# Patient Record
Sex: FEMALE | Race: BLACK OR AFRICAN AMERICAN | NOT HISPANIC OR LATINO | Employment: UNEMPLOYED | URBAN - METROPOLITAN AREA
[De-identification: names, ages, dates, MRNs, and addresses within clinical notes are randomized per-mention and may not be internally consistent; named-entity substitution may affect disease eponyms.]

---

## 2017-09-29 ENCOUNTER — HOSPITAL ENCOUNTER (OUTPATIENT)
Dept: PREADMISSION TESTING | Facility: HOSPITAL | Age: 52
Discharge: HOME | End: 2017-09-29
Attending: SURGERY
Payer: MEDICAID

## 2017-09-29 VITALS
BODY MASS INDEX: 29.15 KG/M2 | SYSTOLIC BLOOD PRESSURE: 132 MMHG | OXYGEN SATURATION: 98 % | RESPIRATION RATE: 20 BRPM | DIASTOLIC BLOOD PRESSURE: 79 MMHG | WEIGHT: 158.38 LBS | HEIGHT: 62 IN | HEART RATE: 65 BPM | TEMPERATURE: 98 F

## 2017-09-29 DIAGNOSIS — I10 HTN (HYPERTENSION), MALIGNANT: Primary | ICD-10-CM

## 2017-09-29 PROCEDURE — 93005 ELECTROCARDIOGRAM TRACING: CPT

## 2017-09-29 PROCEDURE — 93010 ELECTROCARDIOGRAM REPORT: CPT | Mod: ,,, | Performed by: INTERNAL MEDICINE

## 2017-09-29 NOTE — DISCHARGE INSTRUCTIONS
Your surgery is scheduled for___TUESDAY 10/3______________.    Call [phone] between 2 pm and 5 pm ___MONDAY 10/2_________ to find out your arrival time for the day of surgery.    Report to SAME DAY SURGERY UNIT at _______am on the 2nd floor of the hospital.  Use the front entrance of the hospital before 6 am.  .    Important instructions:   Do not eat or drink after 12 midnight, including water.  It is okay to brush your teeth.  Do not have gum, candy or mints.     Take only these medications with a small swallow of water on the morning of your surgery.__AMLODIPINE___________               Prep instructions:    SHOWER   OTHER_____________     Please shower the night before and the morning of your surgery.       Use Hibiclens soap to your surgery site if instructed by your pre op nurse.   .  Be sure to rinse off Hibiclens after it is on your skin for several minutes.  Do not use Hibiclens on your face or genitals.      Do not wear make- up, including mascara.     You may wear deodorant only.      Do not wear powder, body lotion or cologne.     Do not wear any jewelry or have any metal on your body.          If you are going home on the same day of surgery, you must have arrangements for a ride home.  You will not be able to drive home if you were given anesthesia or sedation.          .     Stop taking Aspirin, Ibuprofen, Motrin and Aleve at least 3-5 days before your surgery. You may use Tylenol.     Stop taking fish oil and vitamin E for least 5 days before surgery.     Wear loose fitting clothes allowing for bandages.     Please leave money and valuables home.       You may bring your cell phone.     Call the doctor if fever or illness should occur before your surgery.    Call [phone] to contact us here at [facility] if needed.

## 2017-10-02 ENCOUNTER — ANESTHESIA EVENT (OUTPATIENT)
Dept: SURGERY | Facility: HOSPITAL | Age: 52
End: 2017-10-02
Payer: MEDICAID

## 2017-10-03 ENCOUNTER — SURGERY (OUTPATIENT)
Age: 52
End: 2017-10-03

## 2017-10-03 ENCOUNTER — HOSPITAL ENCOUNTER (OUTPATIENT)
Dept: RADIOLOGY | Facility: HOSPITAL | Age: 52
Discharge: HOME | End: 2017-10-03
Attending: SURGERY | Admitting: SURGERY
Payer: MEDICAID

## 2017-10-03 ENCOUNTER — HOSPITAL ENCOUNTER (OUTPATIENT)
Dept: PREADMISSION TESTING | Facility: HOSPITAL | Age: 52
Discharge: HOME | End: 2017-10-03
Attending: SURGERY
Payer: MEDICAID

## 2017-10-03 ENCOUNTER — ANESTHESIA (OUTPATIENT)
Dept: SURGERY | Facility: HOSPITAL | Age: 52
End: 2017-10-03
Payer: MEDICAID

## 2017-10-03 ENCOUNTER — HOSPITAL ENCOUNTER (OUTPATIENT)
Facility: HOSPITAL | Age: 52
Discharge: HOME | End: 2017-10-03
Attending: SURGERY | Admitting: SURGERY
Payer: MEDICAID

## 2017-10-03 VITALS
HEART RATE: 64 BPM | WEIGHT: 158.38 LBS | HEIGHT: 62 IN | BODY MASS INDEX: 29.15 KG/M2 | RESPIRATION RATE: 17 BRPM | DIASTOLIC BLOOD PRESSURE: 89 MMHG | OXYGEN SATURATION: 100 % | SYSTOLIC BLOOD PRESSURE: 149 MMHG | TEMPERATURE: 98 F

## 2017-10-03 DIAGNOSIS — C50.412 INFILTRATING DUCTAL CARCINOMA OF UPPER-OUTER QUADRANT OF LEFT BREAST IN FEMALE: ICD-10-CM

## 2017-10-03 DIAGNOSIS — Z01.818 PREOPERATIVE TESTING: Primary | ICD-10-CM

## 2017-10-03 DIAGNOSIS — C50.412 PRIMARY CANCER OF UPPER OUTER QUADRANT OF LEFT FEMALE BREAST: Primary | ICD-10-CM

## 2017-10-03 PROCEDURE — 37000009 HC ANESTHESIA EA ADD 15 MINS: Performed by: SURGERY

## 2017-10-03 PROCEDURE — D9220A PRA ANESTHESIA: Mod: CRNA,,, | Performed by: NURSE ANESTHETIST, CERTIFIED REGISTERED

## 2017-10-03 PROCEDURE — 71020 XR CHEST PA AND LATERAL PRE-OP: CPT | Mod: 26,,, | Performed by: RADIOLOGY

## 2017-10-03 PROCEDURE — 36000706: Performed by: SURGERY

## 2017-10-03 PROCEDURE — 25000003 PHARM REV CODE 250: Performed by: ANESTHESIOLOGY

## 2017-10-03 PROCEDURE — 71000016 HC POSTOP RECOV ADDL HR: Performed by: SURGERY

## 2017-10-03 PROCEDURE — 71020 XR CHEST PA AND LATERAL PRE-OP: CPT | Mod: TC

## 2017-10-03 PROCEDURE — 63600175 PHARM REV CODE 636 W HCPCS: Performed by: SURGERY

## 2017-10-03 PROCEDURE — D9220A PRA ANESTHESIA: Mod: ANES,,, | Performed by: ANESTHESIOLOGY

## 2017-10-03 PROCEDURE — 37000008 HC ANESTHESIA 1ST 15 MINUTES: Performed by: SURGERY

## 2017-10-03 PROCEDURE — C1788 PORT, INDWELLING, IMP: HCPCS | Performed by: SURGERY

## 2017-10-03 PROCEDURE — 36000707: Performed by: SURGERY

## 2017-10-03 PROCEDURE — 63600175 PHARM REV CODE 636 W HCPCS: Performed by: NURSE ANESTHETIST, CERTIFIED REGISTERED

## 2017-10-03 PROCEDURE — 71000015 HC POSTOP RECOV 1ST HR: Performed by: SURGERY

## 2017-10-03 PROCEDURE — 25000003 PHARM REV CODE 250: Performed by: SURGERY

## 2017-10-03 DEVICE — PORT POWER 8FR NO SUT PLUG: Type: IMPLANTABLE DEVICE | Site: CHEST | Status: FUNCTIONAL

## 2017-10-03 RX ORDER — MIDAZOLAM HYDROCHLORIDE 1 MG/ML
INJECTION, SOLUTION INTRAMUSCULAR; INTRAVENOUS
Status: DISCONTINUED | OUTPATIENT
Start: 2017-10-03 | End: 2017-10-03

## 2017-10-03 RX ORDER — BUPIVACAINE HYDROCHLORIDE 2.5 MG/ML
INJECTION, SOLUTION EPIDURAL; INFILTRATION; INTRACAUDAL
Status: DISCONTINUED | OUTPATIENT
Start: 2017-10-03 | End: 2017-10-03 | Stop reason: HOSPADM

## 2017-10-03 RX ORDER — HEPARIN SODIUM 1000 [USP'U]/ML
INJECTION, SOLUTION INTRAVENOUS; SUBCUTANEOUS
Status: DISCONTINUED | OUTPATIENT
Start: 2017-10-03 | End: 2017-10-03 | Stop reason: HOSPADM

## 2017-10-03 RX ORDER — HYDROCODONE BITARTRATE AND ACETAMINOPHEN 5; 325 MG/1; MG/1
1 TABLET ORAL EVERY 4 HOURS PRN
Status: DISCONTINUED | OUTPATIENT
Start: 2017-10-03 | End: 2017-10-03 | Stop reason: HOSPADM

## 2017-10-03 RX ORDER — SODIUM CHLORIDE 9 MG/ML
INJECTION, SOLUTION INTRAVENOUS CONTINUOUS
Status: DISCONTINUED | OUTPATIENT
Start: 2017-10-03 | End: 2017-10-03 | Stop reason: HOSPADM

## 2017-10-03 RX ORDER — ACETAMINOPHEN 10 MG/ML
1000 INJECTION, SOLUTION INTRAVENOUS ONCE
Status: DISCONTINUED | OUTPATIENT
Start: 2017-10-03 | End: 2017-10-03 | Stop reason: SDUPTHER

## 2017-10-03 RX ORDER — LIDOCAINE HYDROCHLORIDE 10 MG/ML
INJECTION, SOLUTION EPIDURAL; INFILTRATION; INTRACAUDAL; PERINEURAL
Status: DISCONTINUED | OUTPATIENT
Start: 2017-10-03 | End: 2017-10-03 | Stop reason: HOSPADM

## 2017-10-03 RX ORDER — HYDROCODONE BITARTRATE AND ACETAMINOPHEN 5; 325 MG/1; MG/1
1 TABLET ORAL EVERY 4 HOURS PRN
Status: DISCONTINUED | OUTPATIENT
Start: 2017-10-03 | End: 2017-10-03 | Stop reason: SDUPTHER

## 2017-10-03 RX ORDER — PROPOFOL 10 MG/ML
VIAL (ML) INTRAVENOUS CONTINUOUS PRN
Status: DISCONTINUED | OUTPATIENT
Start: 2017-10-03 | End: 2017-10-03

## 2017-10-03 RX ORDER — CEFAZOLIN SODIUM 2 G/50ML
2 SOLUTION INTRAVENOUS
Status: COMPLETED | OUTPATIENT
Start: 2017-10-03 | End: 2017-10-03

## 2017-10-03 RX ORDER — OXYCODONE AND ACETAMINOPHEN 5; 325 MG/1; MG/1
1 TABLET ORAL EVERY 4 HOURS PRN
Qty: 30 TABLET | Refills: 0 | Status: SHIPPED | OUTPATIENT
Start: 2017-10-03

## 2017-10-03 RX ORDER — MORPHINE SULFATE 10 MG/ML
3 INJECTION INTRAMUSCULAR; INTRAVENOUS; SUBCUTANEOUS
Status: DISCONTINUED | OUTPATIENT
Start: 2017-10-03 | End: 2017-10-03 | Stop reason: SDUPTHER

## 2017-10-03 RX ORDER — SODIUM CHLORIDE, SODIUM LACTATE, POTASSIUM CHLORIDE, CALCIUM CHLORIDE 600; 310; 30; 20 MG/100ML; MG/100ML; MG/100ML; MG/100ML
INJECTION, SOLUTION INTRAVENOUS CONTINUOUS
Status: DISCONTINUED | OUTPATIENT
Start: 2017-10-03 | End: 2017-10-03 | Stop reason: HOSPADM

## 2017-10-03 RX ORDER — FENTANYL CITRATE 50 UG/ML
INJECTION, SOLUTION INTRAMUSCULAR; INTRAVENOUS
Status: DISCONTINUED | OUTPATIENT
Start: 2017-10-03 | End: 2017-10-03

## 2017-10-03 RX ORDER — LIDOCAINE HYDROCHLORIDE 10 MG/ML
1 INJECTION, SOLUTION EPIDURAL; INFILTRATION; INTRACAUDAL; PERINEURAL ONCE
Status: DISCONTINUED | OUTPATIENT
Start: 2017-10-03 | End: 2017-10-03 | Stop reason: HOSPADM

## 2017-10-03 RX ORDER — MORPHINE SULFATE 10 MG/ML
3 INJECTION INTRAMUSCULAR; INTRAVENOUS; SUBCUTANEOUS
Status: DISCONTINUED | OUTPATIENT
Start: 2017-10-03 | End: 2017-10-03 | Stop reason: HOSPADM

## 2017-10-03 RX ORDER — LIDOCAINE HCL/PF 100 MG/5ML
SYRINGE (ML) INTRAVENOUS
Status: DISCONTINUED | OUTPATIENT
Start: 2017-10-03 | End: 2017-10-03

## 2017-10-03 RX ORDER — ACETAMINOPHEN 10 MG/ML
1000 INJECTION, SOLUTION INTRAVENOUS ONCE
Status: DISCONTINUED | OUTPATIENT
Start: 2017-10-03 | End: 2017-10-03 | Stop reason: HOSPADM

## 2017-10-03 RX ORDER — PROPOFOL 10 MG/ML
VIAL (ML) INTRAVENOUS
Status: DISCONTINUED | OUTPATIENT
Start: 2017-10-03 | End: 2017-10-03

## 2017-10-03 RX ADMIN — PROPOFOL 100 MG: 10 INJECTION, EMULSION INTRAVENOUS at 11:10

## 2017-10-03 RX ADMIN — MIDAZOLAM HYDROCHLORIDE 2 MG: 1 INJECTION, SOLUTION INTRAMUSCULAR; INTRAVENOUS at 10:10

## 2017-10-03 RX ADMIN — FENTANYL CITRATE 50 MCG: 50 INJECTION INTRAMUSCULAR; INTRAVENOUS at 11:10

## 2017-10-03 RX ADMIN — LIDOCAINE HYDROCHLORIDE 100 MG: 20 INJECTION, SOLUTION INTRAVENOUS at 11:10

## 2017-10-03 RX ADMIN — PROPOFOL 30 MG: 10 INJECTION, EMULSION INTRAVENOUS at 11:10

## 2017-10-03 RX ADMIN — SODIUM CHLORIDE, SODIUM LACTATE, POTASSIUM CHLORIDE, AND CALCIUM CHLORIDE: 600; 310; 30; 20 INJECTION, SOLUTION INTRAVENOUS at 08:10

## 2017-10-03 RX ADMIN — CEFAZOLIN SODIUM 2 G: 2 SOLUTION INTRAVENOUS at 10:10

## 2017-10-03 RX ADMIN — BUPIVACAINE HYDROCHLORIDE 20 ML: 2.5 INJECTION, SOLUTION EPIDURAL; INFILTRATION; INTRACAUDAL; PERINEURAL at 11:10

## 2017-10-03 RX ADMIN — HEPARIN SODIUM 2500 UNITS: 1000 INJECTION, SOLUTION INTRAVENOUS; SUBCUTANEOUS at 11:10

## 2017-10-03 RX ADMIN — LIDOCAINE HYDROCHLORIDE 20 ML: 10 INJECTION, SOLUTION EPIDURAL; INFILTRATION; INTRACAUDAL; PERINEURAL at 11:10

## 2017-10-03 RX ADMIN — HYDROCODONE BITARTRATE AND ACETAMINOPHEN 1 TABLET: 5; 325 TABLET ORAL at 11:10

## 2017-10-03 RX ADMIN — PROPOFOL 120 MCG/KG/MIN: 10 INJECTION, EMULSION INTRAVENOUS at 11:10

## 2017-10-03 NOTE — OP NOTE
Port-A-Cath Insertion        DATE OF PROCEDURE: 10/03/2017     PREOPERATIVE DIAGNOSIS: breast cancer     POSTOPERATIVE DIAGNOSIS: same     PROCEDURE PERFORMED: Port-A-Cath insertion via cephalic vein cut down, left.     SURGEON: Kishan Moore M.D.     ANESTHESIA: General.     DESCRIPTION OF OPERATION: The patient was brought to the Operating Room, placed  on operating room table in supine position. Under adequate general anesthesia,  prepped and draped around her left shoulder in the usual sterile fashion.   Incision was made in deltopectoral groove, deepened to expose her cephalic vein.  Proximal and distal control was obtained. Small venotomy was performed.   Catheter was inserted to the cavoatrial junction and verified with fluoroscopy.   This was attached to a port, which was fixed to the patient's chest wall and   flushed. The wound was then closed in layers with absorbable suture.   Steri-Strips were applied as well as bandage.     ESTIMATED BLOOD LOSS: Minimal.

## 2017-10-03 NOTE — DISCHARGE INSTRUCTIONS
[facility]  Office # [phone]     Discharge Instructions for Same Day Surgery     Call the office for and appointment if one has not already been made.     Diet: Drink plenty of fluids the first 48 hours and you may resume your   usual diet.     Activity: No heavy lifting (over 10 pounds), pushing or pulling until your   post op visit. Your doctor's office may have told you to limit your lifting to less weight, or even no weight.  Be sure to follow those instructions.    Note: You may ride in a car and you may drive when comfortable.     Do not drive, drink alcohol, or sign legal documents for 24 hours, or if taking narcotic pain medication.     YOUR LAST PAIN PILL WAS GIVEN AT 12:00 PM    Dressings: Remove the dressing 24hours after surgery. You may shower or bathe  24 hours after surgery and you may wash your hair.     If you have steri strips ( appears to be strips of white tape) on   your incision, leave them on.         Medical: Call the doctor for any of the following problems: fever above 101,   severe pain, bleeding, or abdominal distention (swelling).   If constipated you may take any stool softener you choose.     Occasionally small areas of skin numbness or an unpleasant skin sensation can result. Also, you may find that your incision is swollen and tender for a few days.  Some redness around sutures and staples is a normal reaction, but if the discomfort persists or worsens, call you doctor.     Fall Prevention  Millions of people fall every year and injure themselves. You may have had anesthesia or sedation which may increase your risk of falling. You may have health issues that put you at an increased risk of falling.     Here are ways to reduce your risk of falling.  ·   · Make your home safe by keeping walkways clear of objects you may trip over.  · Use non-slip pads under rugs. Do not use area rugs or small throw rugs.  · Use non-slip mats in bathtubs and showers.  · Install  handrails and lights on staircases.  · Do not walk in poorly lit areas.  · Do not stand on chairs or wobbly ladders.  · Use caution when reaching overhead or looking upward. This position can cause a loss of balance.  · Be sure your shoes fit properly, have non-slip bottoms and are in good condition.   · Wear shoes both inside and out. Avoid going barefoot or wearing slippers.  · Be cautious when going up and down stairs, curbs, and when walking on uneven sidewalks.  · If your balance is poor, consider using a cane or walker.  · If your fall was related to alcohol use, stop or limit alcohol intake.   · If your fall was related to use of sleeping medicines, talk to your doctor about this. You may need to reduce your dosage at bedtime if you awaken during the night to go to the bathroom.    · To reduce the need for nighttime bathroom trips:  ¨ Avoid drinking fluids for several hours before going to bed  ¨ Empty your bladder before going to bed  ¨ Men can keep a urinal at the bedside  · Stay as active as you can. Balance, flexibility, strength, and endurance all come from exercise. They all play a role in preventing falls. Ask your healthcare provider which types of activity are right for you.  · Get your vision checked on a regular basis.  · If you have pets, know where they are before you stand up or walk so you don't trip over them.  · Use night lights.

## 2017-10-03 NOTE — OR NURSING
Pt reports has been having a large amount of rectal bleeding.  Stated she had an appt for colonoscopy and with new diagnosis and testing was unable to complete the testing.  Stressed to patient how important it is to follow up and get colonoscopy done in respect to diagnosis, relating to chemo and for overall wellbeing.  Pt agreed.  Pt reports occasional chest discomfort and BANERJEE, no present complaints spoke with MATHEW Watson CRNA anesthesia.

## 2017-10-03 NOTE — BRIEF OP NOTE
[facility]  Brief Operative Note     SUMMARY     Surgery Date: 10/3/2017     Surgeon(s) and Role:     * Kishan Moore MD - Primary    Assisting Surgeon: None    Pre-op Diagnosis:  Malignant neoplasm of upper-outer quadrant of left female breast, unspecified estrogen receptor status [C50.412]    Post-op Diagnosis:  Post-Op Diagnosis Codes:     * Malignant neoplasm of upper-outer quadrant of left female breast, unspecified estrogen receptor status [C50.412]    Procedure(s) (LRB):  [id number] (N/A)    Anesthesia: Monitor Anesthesia Care    Description of the findings of the procedure: none    Findings/Key Components: none    Estimated Blood Loss: minimal         Specimens:   Specimen (12h ago through future)    None          Discharge Note    SUMMARY     Admit Date: 10/3/2017    Discharge Date and Time:  10/03/2017 11:24 AM    Hospital Course (synopsis of major diagnoses, care, treatment, and services provided during the course of the hospital stay): uneventful post op course     Final Diagnosis: Post-Op Diagnosis Codes:     * Malignant neoplasm of upper-outer quadrant of left female breast, unspecified estrogen receptor status [C50.412]    Disposition: Home or Self Care    Follow Up/Patient Instructions:     Medications:  Reconciled Home Medications:   Current Discharge Medication List      CONTINUE these medications which have NOT CHANGED    Details   alprazolam (XANAX) 0.5 MG tablet       amlodipine (NORVASC) 5 MG tablet       ENDACOF - DM 1-2.5-5 mg/5 mL Soln TAKE 5 ml (1 teaspoon) BY MOUTH EVERY 6 HOURS AS NEEDED  Refills: 0      hydrochlorothiazide (HYDRODIURIL) 50 MG tablet Take 1 tablet (50 mg total) by mouth once daily.  Qty: 90 tablet, Refills: 2      lisinopril (PRINIVIL,ZESTRIL) 40 MG tablet Take 0.5 tablets (20 mg total) by mouth once daily.  Qty: 90 tablet, Refills: 0      albuterol 90 mcg/actuation inhaler Inhale 2 puffs into the lungs every 4 (four) hours as needed for  Wheezing or Shortness of Breath.  Qty: 18 g, Refills: 0      ibuprofen (ADVIL,MOTRIN) 800 MG tablet              Discharge Procedure Orders  Diet general     Activity as tolerated     Shower on day dressing removed (No bath)     Call MD for:  temperature >100.4     Call MD for:  persistent nausea and vomiting     Call MD for:  severe uncontrolled pain     Call MD for:  difficulty breathing, headache or visual disturbances     Call MD for:  redness, tenderness, or signs of infection (pain, swelling, redness, odor or green/yellow discharge around incision site)     Call MD for:  hives     Remove dressing in 24 hours       Follow-up Information     Kishan Moore MD.    Specialty:  General Surgery  Contact information:  [address] [place] [address]  [id number]  [place] [address] [phone]

## 2017-10-03 NOTE — INTERVAL H&P NOTE
The patient has been examined and the H&P has been reviewed:    I concur with the findings and no changes have occurred since H&P was written.    Anesthesia/Surgery risks, benefits and alternative options discussed and understood by patient/family.          Active Hospital Problems    Diagnosis  POA    Infiltrating ductal carcinoma of upper-outer quadrant of left breast in female [C50.412]  Yes      Resolved Hospital Problems    Diagnosis Date Resolved POA   No resolved problems to display.

## 2017-10-03 NOTE — H&P (VIEW-ONLY)
Subjective:       Patient ID: Aundrea Palacio is a 51 y.o. female.    Chief Complaint: Breast Cancer    HPI 52 yo female with newly diagnosed triple negative breast cancer with strong family history.  Review of Systems   Constitutional: Negative.    HENT: Negative.    Eyes: Negative.    Respiratory: Negative.    Cardiovascular: Negative.    Gastrointestinal: Negative.    Endocrine: Negative.    Musculoskeletal: Negative.    Skin: Negative.    Allergic/Immunologic: Negative.    Neurological: Negative.    Hematological: Negative.    Psychiatric/Behavioral: Negative.    All other systems reviewed and are negative.      Objective:      Physical Exam   Constitutional: She is oriented to person, place, and time. She appears well-developed and well-nourished.   HENT:   Head: Normocephalic and atraumatic.   Right Ear: External ear normal.   Left Ear: External ear normal.   Nose: Nose normal.   Mouth/Throat: Oropharynx is clear and moist.   Eyes: Conjunctivae and EOM are normal. Pupils are equal, round, and reactive to light.   Neck: Normal range of motion. Neck supple.   Cardiovascular: Normal rate, regular rhythm, normal heart sounds and intact distal pulses.    Pulmonary/Chest: Effort normal and breath sounds normal. Right breast exhibits no inverted nipple, no mass, no nipple discharge, no skin change and no tenderness. Left breast exhibits mass. Left breast exhibits no inverted nipple, no nipple discharge, no skin change and no tenderness.       Abdominal: Soft. Bowel sounds are normal.   Musculoskeletal: Normal range of motion.   Neurological: She is alert and oriented to person, place, and time. She has normal reflexes.   Skin: Skin is warm and dry.   Psychiatric: She has a normal mood and affect. Her behavior is normal. Thought content normal.   Vitals reviewed.      Assessment:       1. Malignant neoplasm of upper-outer quadrant of left breast in female, estrogen receptor negative        Plan:       To the OR for  port placement and then for chemo and post chemo lumpectomy vs mastectomy

## 2017-10-03 NOTE — TRANSFER OF CARE
"Anesthesia Transfer of Care Note    Patient: Aundrea Palacio    Procedure(s) Performed: Procedure(s) (LRB):  [id number] (N/A)    Patient location: OPS    Anesthesia Type: MAC    Transport from OR: Transported from OR on room air with adequate spontaneous ventilation    Post pain: adequate analgesia    Post assessment: no apparent anesthetic complications    Post vital signs: stable    Level of consciousness: awake, alert and oriented    Nausea/Vomiting: no nausea/vomiting    Complications: none    Transfer of care protocol was followed      Last vitals:   Visit Vitals  /77   Pulse 67   Temp 36.4 °C (97.5 °F)   Resp 16   Ht 5' 2" (1.575 m)   Wt 71.8 kg (158 lb 6 oz)   SpO2 99%   BMI 28.97 kg/m²     "

## 2017-10-04 NOTE — ANESTHESIA PREPROCEDURE EVALUATION
10/04/2017  Aundrea Palacio is a 52 y.o., female.    Anesthesia Evaluation     I have reviewed the Nursing Notes.      Review of Systems  Anesthesia Hx:  No problems with previous Anesthesia   Hematology/Oncology:        Current/Recent Cancer. Breast   Cardiovascular:   Exercise tolerance: good Denies Pacemaker. Hypertension     Pulmonary:   Denies Pneumonia Denies COPD.  Denies Asthma.  Denies Recent URI.    Renal/:  Renal/ Normal     Hepatic/GI:   No Bowel Prep. Denies Liver Disease. Denies Hepatitis. Pt reports bleeding in her stool. She agrees to contact GI for evaluation   Neurological:  Neurology Normal    Endocrine:  Endocrine Normal        Physical Exam  General:  Well nourished    Airway/Jaw/Neck:  AIRWAY FINDINGS: Normal           Mental Status:  Mental Status Findings: Normal        Anesthesia Plan  Type of Anesthesia, risks & benefits discussed:  Anesthesia Type:  MAC  Patient's Preference:   Intra-op Monitoring Plan: standard ASA monitors  Intra-op Monitoring Plan Comments:   Post Op Pain Control Plan:   Post Op Pain Control Plan Comments:   Induction:   IV  Beta Blocker:  Patient is not currently on a Beta-Blocker (No further documentation required).       Informed Consent: Patient understands risks and agrees with Anesthesia plan.  Questions answered. Anesthesia consent signed with patient.  ASA Score: 2     Day of Surgery Review of History & Physical:    H&P update referred to the surgeon.         Ready For Surgery From Anesthesia Perspective.

## 2017-10-04 NOTE — ANESTHESIA POSTPROCEDURE EVALUATION
"Anesthesia Post Evaluation    Patient: Aundrea Palacio    Procedure(s) Performed: Procedure(s) (LRB):  [id number] (N/A)    Final Anesthesia Type: MAC  Patient location during evaluation: PACU  Patient participation: Yes- Able to Participate  Level of consciousness: oriented and awake and alert  Post-procedure vital signs: reviewed and stable  Pain management: adequate  Airway patency: patent  PONV status at discharge: No PONV  Anesthetic complications: no      Cardiovascular status: blood pressure returned to baseline and hemodynamically stable  Respiratory status: unassisted  Hydration status: euvolemic  Follow-up not needed.        Visit Vitals  BP (!) 149/89   Pulse 64   Temp 36.5 °C (97.7 °F)   Resp 17   Ht 5' 2" (1.575 m)   Wt 71.8 kg (158 lb 6 oz)   SpO2 100%   BMI 28.97 kg/m²       Pain/Americo Score: Pain Assessment Performed: Yes (10/3/2017  1:30 PM)  Presence of Pain: complains of pain/discomfort (10/3/2017  1:30 PM)  Pain Rating Prior to Med Admin: 9 (10/3/2017 11:59 AM)  Pain Rating Post Med Admin: 5 (10/3/2017  1:30 PM)  Americo Score: 10 (10/3/2017  1:00 PM)  Modified Americo Score: 19 (10/3/2017  1:00 PM)      "

## 2018-11-15 ENCOUNTER — HISTORICAL (OUTPATIENT)
Dept: RADIOLOGY | Facility: HOSPITAL | Age: 53
End: 2018-11-15

## 2018-12-04 ENCOUNTER — HISTORICAL (OUTPATIENT)
Dept: INFUSION THERAPY | Facility: HOSPITAL | Age: 53
End: 2018-12-04

## 2019-01-02 ENCOUNTER — HISTORICAL (OUTPATIENT)
Dept: ADMINISTRATIVE | Facility: HOSPITAL | Age: 54
End: 2019-01-02

## 2019-01-09 ENCOUNTER — HOSPITAL ENCOUNTER (OUTPATIENT)
Dept: MEDSURG UNIT | Facility: HOSPITAL | Age: 54
End: 2019-01-10

## 2019-01-21 ENCOUNTER — HISTORICAL (OUTPATIENT)
Dept: RADIATION THERAPY | Facility: HOSPITAL | Age: 54
End: 2019-01-21

## 2019-01-29 ENCOUNTER — HISTORICAL (OUTPATIENT)
Dept: INFUSION THERAPY | Facility: HOSPITAL | Age: 54
End: 2019-01-29

## 2019-03-26 ENCOUNTER — HISTORICAL (OUTPATIENT)
Dept: INFUSION THERAPY | Facility: HOSPITAL | Age: 54
End: 2019-03-26

## 2019-05-28 ENCOUNTER — HISTORICAL (OUTPATIENT)
Dept: INFUSION THERAPY | Facility: HOSPITAL | Age: 54
End: 2019-05-28

## 2019-07-09 ENCOUNTER — HISTORICAL (OUTPATIENT)
Dept: RADIOLOGY | Facility: HOSPITAL | Age: 54
End: 2019-07-09

## 2019-07-23 ENCOUNTER — HISTORICAL (OUTPATIENT)
Dept: INFUSION THERAPY | Facility: HOSPITAL | Age: 54
End: 2019-07-23

## 2019-08-29 ENCOUNTER — HISTORICAL (OUTPATIENT)
Dept: RADIATION THERAPY | Facility: HOSPITAL | Age: 54
End: 2019-08-29

## 2019-09-03 ENCOUNTER — HISTORICAL (OUTPATIENT)
Dept: RADIATION THERAPY | Facility: HOSPITAL | Age: 54
End: 2019-09-03

## 2019-09-05 ENCOUNTER — HISTORICAL (OUTPATIENT)
Dept: RADIATION THERAPY | Facility: HOSPITAL | Age: 54
End: 2019-09-05

## 2019-09-06 ENCOUNTER — HISTORICAL (OUTPATIENT)
Dept: RADIATION THERAPY | Facility: HOSPITAL | Age: 54
End: 2019-09-06

## 2019-09-09 ENCOUNTER — HISTORICAL (OUTPATIENT)
Dept: RADIATION THERAPY | Facility: HOSPITAL | Age: 54
End: 2019-09-09

## 2019-09-10 ENCOUNTER — HISTORICAL (OUTPATIENT)
Dept: RADIATION THERAPY | Facility: HOSPITAL | Age: 54
End: 2019-09-10

## 2019-09-11 ENCOUNTER — HISTORICAL (OUTPATIENT)
Dept: RADIATION THERAPY | Facility: HOSPITAL | Age: 54
End: 2019-09-11

## 2019-09-12 ENCOUNTER — HISTORICAL (OUTPATIENT)
Dept: RADIATION THERAPY | Facility: HOSPITAL | Age: 54
End: 2019-09-12

## 2019-09-13 ENCOUNTER — HISTORICAL (OUTPATIENT)
Dept: RADIATION THERAPY | Facility: HOSPITAL | Age: 54
End: 2019-09-13

## 2019-09-16 ENCOUNTER — HISTORICAL (OUTPATIENT)
Dept: RADIATION THERAPY | Facility: HOSPITAL | Age: 54
End: 2019-09-16

## 2019-09-17 ENCOUNTER — HISTORICAL (OUTPATIENT)
Dept: INFUSION THERAPY | Facility: HOSPITAL | Age: 54
End: 2019-09-17

## 2019-09-17 ENCOUNTER — HISTORICAL (OUTPATIENT)
Dept: RADIATION THERAPY | Facility: HOSPITAL | Age: 54
End: 2019-09-17

## 2019-09-18 ENCOUNTER — HISTORICAL (OUTPATIENT)
Dept: RADIATION THERAPY | Facility: HOSPITAL | Age: 54
End: 2019-09-18

## 2019-09-19 ENCOUNTER — HISTORICAL (OUTPATIENT)
Dept: RADIATION THERAPY | Facility: HOSPITAL | Age: 54
End: 2019-09-19

## 2019-09-20 ENCOUNTER — HISTORICAL (OUTPATIENT)
Dept: RADIATION THERAPY | Facility: HOSPITAL | Age: 54
End: 2019-09-20

## 2019-09-23 ENCOUNTER — HISTORICAL (OUTPATIENT)
Dept: RADIATION THERAPY | Facility: HOSPITAL | Age: 54
End: 2019-09-23

## 2019-09-24 ENCOUNTER — HISTORICAL (OUTPATIENT)
Dept: RADIATION THERAPY | Facility: HOSPITAL | Age: 54
End: 2019-09-24

## 2019-09-25 ENCOUNTER — HISTORICAL (OUTPATIENT)
Dept: RADIATION THERAPY | Facility: HOSPITAL | Age: 54
End: 2019-09-25

## 2019-09-26 ENCOUNTER — HISTORICAL (OUTPATIENT)
Dept: RADIATION THERAPY | Facility: HOSPITAL | Age: 54
End: 2019-09-26

## 2019-09-27 ENCOUNTER — HISTORICAL (OUTPATIENT)
Dept: RADIATION THERAPY | Facility: HOSPITAL | Age: 54
End: 2019-09-27

## 2019-09-30 ENCOUNTER — HISTORICAL (OUTPATIENT)
Dept: RADIATION THERAPY | Facility: HOSPITAL | Age: 54
End: 2019-09-30

## 2019-10-17 ENCOUNTER — HISTORICAL (OUTPATIENT)
Dept: RADIATION THERAPY | Facility: HOSPITAL | Age: 54
End: 2019-10-17

## 2019-11-05 NOTE — PLAN OF CARE
Pre-operative instructions, medication directives and pain scales reviewed with Ms Palacio. All questions the patient had  were answered. Re-assurance about surgical procedure and day of surgery routine given as needed. Ms Palacio verbalized understanding of the pre-op instructions.   Principal Discharge DX:	Dysuria

## 2019-11-12 ENCOUNTER — HISTORICAL (OUTPATIENT)
Dept: INFUSION THERAPY | Facility: HOSPITAL | Age: 54
End: 2019-11-12

## 2019-11-20 ENCOUNTER — HISTORICAL (OUTPATIENT)
Dept: ADMINISTRATIVE | Facility: HOSPITAL | Age: 54
End: 2019-11-20

## 2019-11-20 LAB
ABS NEUT (OLG): 2.19 X10(3)/MCL (ref 2.1–9.2)
ALBUMIN SERPL-MCNC: 3.6 GM/DL (ref 3.4–5)
ALBUMIN/GLOB SERPL: 0.9 RATIO (ref 1.1–2)
ALP SERPL-CCNC: 79 UNIT/L (ref 45–117)
ALT SERPL-CCNC: 21 UNIT/L (ref 12–78)
AST SERPL-CCNC: 20 UNIT/L (ref 15–37)
BASOPHILS # BLD AUTO: 0 X10(3)/MCL (ref 0–0.2)
BASOPHILS NFR BLD AUTO: 0 %
BILIRUB SERPL-MCNC: 0.4 MG/DL (ref 0.2–1)
BILIRUBIN DIRECT+TOT PNL SERPL-MCNC: <0.1 MG/DL (ref 0–0.2)
BILIRUBIN DIRECT+TOT PNL SERPL-MCNC: >0.3 MG/DL
BUN SERPL-MCNC: 13 MG/DL (ref 7–18)
CALCIUM SERPL-MCNC: 9.2 MG/DL (ref 8.5–10.1)
CEA SERPL-MCNC: 1.8 NG/ML
CHLORIDE SERPL-SCNC: 110 MMOL/L (ref 98–107)
CO2 SERPL-SCNC: 29 MMOL/L (ref 21–32)
CREAT SERPL-MCNC: 0.9 MG/DL (ref 0.6–1.3)
EOSINOPHIL # BLD AUTO: 0 X10(3)/MCL (ref 0–0.9)
EOSINOPHIL NFR BLD AUTO: 1 %
ERYTHROCYTE [DISTWIDTH] IN BLOOD BY AUTOMATED COUNT: 17.6 % (ref 11.5–14.5)
GLOBULIN SER-MCNC: 3.8 GM/ML (ref 2.3–3.5)
GLUCOSE SERPL-MCNC: 108 MG/DL (ref 74–106)
HCT VFR BLD AUTO: 38.8 % (ref 35–46)
HGB BLD-MCNC: 11.9 GM/DL (ref 12–16)
LYMPHOCYTES # BLD AUTO: 0.8 X10(3)/MCL (ref 0.6–4.6)
LYMPHOCYTES NFR BLD AUTO: 24 %
MCH RBC QN AUTO: 27.7 PG (ref 26–34)
MCHC RBC AUTO-ENTMCNC: 30.7 GM/DL (ref 31–37)
MCV RBC AUTO: 90.4 FL (ref 80–100)
MONOCYTES # BLD AUTO: 0.3 X10(3)/MCL (ref 0.1–1.3)
MONOCYTES NFR BLD AUTO: 9 %
NEUTROPHILS # BLD AUTO: 2.19 X10(3)/MCL (ref 2.1–9.2)
NEUTROPHILS NFR BLD AUTO: 66 %
PLATELET # BLD AUTO: 286 X10(3)/MCL (ref 130–400)
PMV BLD AUTO: 8.6 FL (ref 7.4–10.4)
POTASSIUM SERPL-SCNC: 3.8 MMOL/L (ref 3.5–5.1)
PROT SERPL-MCNC: 7.4 GM/DL (ref 6.4–8.2)
RBC # BLD AUTO: 4.29 X10(6)/MCL (ref 4–5.2)
SODIUM SERPL-SCNC: 144 MMOL/L (ref 136–145)
WBC # SPEC AUTO: 3.3 X10(3)/MCL (ref 4.5–11)

## 2019-12-30 ENCOUNTER — HISTORICAL (OUTPATIENT)
Dept: RADIOLOGY | Facility: HOSPITAL | Age: 54
End: 2019-12-30

## 2019-12-30 LAB
ABS NEUT (OLG): 2.45 X10(3)/MCL (ref 2.1–9.2)
BASOPHILS # BLD AUTO: 0 X10(3)/MCL (ref 0–0.2)
BASOPHILS NFR BLD AUTO: 1 %
BUN SERPL-MCNC: 14 MG/DL (ref 7–18)
CALCIUM SERPL-MCNC: 9.4 MG/DL (ref 8.5–10.1)
CHLORIDE SERPL-SCNC: 105 MMOL/L (ref 98–107)
CO2 SERPL-SCNC: 30 MMOL/L (ref 21–32)
CREAT SERPL-MCNC: 0.7 MG/DL (ref 0.6–1.3)
CREAT/UREA NIT SERPL: 20
EOSINOPHIL # BLD AUTO: 0 X10(3)/MCL (ref 0–0.9)
EOSINOPHIL NFR BLD AUTO: 1 %
ERYTHROCYTE [DISTWIDTH] IN BLOOD BY AUTOMATED COUNT: 16.6 % (ref 11.5–14.5)
GLUCOSE SERPL-MCNC: 103 MG/DL (ref 74–106)
HCT VFR BLD AUTO: 41.2 % (ref 35–46)
HGB BLD-MCNC: 12.8 GM/DL (ref 12–16)
LYMPHOCYTES # BLD AUTO: 1 X10(3)/MCL (ref 0.6–4.6)
LYMPHOCYTES NFR BLD AUTO: 25 %
MCH RBC QN AUTO: 27.5 PG (ref 26–34)
MCHC RBC AUTO-ENTMCNC: 31.1 GM/DL (ref 31–37)
MCV RBC AUTO: 88.6 FL (ref 80–100)
MONOCYTES # BLD AUTO: 0.4 X10(3)/MCL (ref 0.1–1.3)
MONOCYTES NFR BLD AUTO: 10 %
NEUTROPHILS # BLD AUTO: 2.45 X10(3)/MCL (ref 2.1–9.2)
NEUTROPHILS NFR BLD AUTO: 63 %
PLATELET # BLD AUTO: 312 X10(3)/MCL (ref 130–400)
PMV BLD AUTO: 8.4 FL (ref 7.4–10.4)
POTASSIUM SERPL-SCNC: 3.7 MMOL/L (ref 3.5–5.1)
RBC # BLD AUTO: 4.65 X10(6)/MCL (ref 4–5.2)
SODIUM SERPL-SCNC: 139 MMOL/L (ref 136–145)
TSH SERPL-ACNC: 1.34 MIU/L (ref 0.36–3.74)
WBC # SPEC AUTO: 3.9 X10(3)/MCL (ref 4.5–11)

## 2020-01-03 ENCOUNTER — HISTORICAL (OUTPATIENT)
Dept: ADMINISTRATIVE | Facility: HOSPITAL | Age: 55
End: 2020-01-03

## 2020-01-03 LAB
CEA SERPL-MCNC: 1.8 NG/ML
T4 FREE SERPL-MCNC: 0.82 NG/DL (ref 0.76–1.46)

## 2020-01-13 ENCOUNTER — HISTORICAL (OUTPATIENT)
Dept: INFUSION THERAPY | Facility: HOSPITAL | Age: 55
End: 2020-01-13

## 2020-01-16 ENCOUNTER — HISTORICAL (OUTPATIENT)
Dept: RADIATION THERAPY | Facility: HOSPITAL | Age: 55
End: 2020-01-16

## 2020-01-28 ENCOUNTER — HISTORICAL (OUTPATIENT)
Dept: INFUSION THERAPY | Facility: HOSPITAL | Age: 55
End: 2020-01-28

## 2020-02-12 ENCOUNTER — HISTORICAL (OUTPATIENT)
Dept: INFUSION THERAPY | Facility: HOSPITAL | Age: 55
End: 2020-02-12

## 2020-02-19 ENCOUNTER — HISTORICAL (OUTPATIENT)
Dept: INFUSION THERAPY | Facility: HOSPITAL | Age: 55
End: 2020-02-19

## 2020-02-26 ENCOUNTER — HISTORICAL (OUTPATIENT)
Dept: INFUSION THERAPY | Facility: HOSPITAL | Age: 55
End: 2020-02-26

## 2020-03-20 ENCOUNTER — HISTORICAL (OUTPATIENT)
Dept: INFUSION THERAPY | Facility: HOSPITAL | Age: 55
End: 2020-03-20

## 2020-03-26 ENCOUNTER — HISTORICAL (OUTPATIENT)
Dept: INFUSION THERAPY | Facility: HOSPITAL | Age: 55
End: 2020-03-26

## 2020-04-15 ENCOUNTER — HISTORICAL (OUTPATIENT)
Dept: RADIOLOGY | Facility: HOSPITAL | Age: 55
End: 2020-04-15

## 2020-04-15 LAB
ABS NEUT (OLG): 3.68 X10(3)/MCL (ref 2.1–9.2)
ALBUMIN SERPL-MCNC: 3.7 GM/DL (ref 3.4–5)
ALBUMIN/GLOB SERPL: 0.9 RATIO (ref 1.1–2)
ALP SERPL-CCNC: 79 UNIT/L (ref 45–117)
ALT SERPL-CCNC: 27 UNIT/L (ref 12–78)
AST SERPL-CCNC: 24 UNIT/L (ref 15–37)
BASOPHILS # BLD AUTO: 0 X10(3)/MCL (ref 0–0.2)
BASOPHILS NFR BLD AUTO: 1 %
BILIRUB SERPL-MCNC: 0.4 MG/DL (ref 0.2–1)
BILIRUBIN DIRECT+TOT PNL SERPL-MCNC: 0.1 MG/DL (ref 0–0.2)
BILIRUBIN DIRECT+TOT PNL SERPL-MCNC: 0.3 MG/DL
BUN SERPL-MCNC: 17 MG/DL (ref 7–18)
CALCIUM SERPL-MCNC: 9.6 MG/DL (ref 8.5–10.1)
CHLORIDE SERPL-SCNC: 105 MMOL/L (ref 98–107)
CO2 SERPL-SCNC: 29 MMOL/L (ref 21–32)
CREAT SERPL-MCNC: 0.7 MG/DL (ref 0.6–1.3)
EOSINOPHIL # BLD AUTO: 0.1 X10(3)/MCL (ref 0–0.9)
EOSINOPHIL NFR BLD AUTO: 2 %
ERYTHROCYTE [DISTWIDTH] IN BLOOD BY AUTOMATED COUNT: 19.7 % (ref 11.5–14.5)
GLOBULIN SER-MCNC: 4.2 GM/ML (ref 2.3–3.5)
GLUCOSE SERPL-MCNC: 77 MG/DL (ref 74–106)
HCT VFR BLD AUTO: 39 % (ref 35–46)
HGB BLD-MCNC: 12 GM/DL (ref 12–16)
IMM GRANULOCYTES # BLD AUTO: 0.01 10*3/UL
IMM GRANULOCYTES NFR BLD AUTO: 0 %
LYMPHOCYTES # BLD AUTO: 0.8 X10(3)/MCL (ref 0.6–4.6)
LYMPHOCYTES NFR BLD AUTO: 16 %
MCH RBC QN AUTO: 27.3 PG (ref 26–34)
MCHC RBC AUTO-ENTMCNC: 30.8 GM/DL (ref 31–37)
MCV RBC AUTO: 88.6 FL (ref 80–100)
MONOCYTES # BLD AUTO: 0.6 X10(3)/MCL (ref 0.1–1.3)
MONOCYTES NFR BLD AUTO: 12 %
NEUTROPHILS # BLD AUTO: 3.68 X10(3)/MCL (ref 2.1–9.2)
NEUTROPHILS NFR BLD AUTO: 70 %
PLATELET # BLD AUTO: 372 X10(3)/MCL (ref 130–400)
PMV BLD AUTO: 8.7 FL (ref 7.4–10.4)
POTASSIUM SERPL-SCNC: 4.1 MMOL/L (ref 3.5–5.1)
PROT SERPL-MCNC: 7.9 GM/DL (ref 6.4–8.2)
RBC # BLD AUTO: 4.4 X10(6)/MCL (ref 4–5.2)
SODIUM SERPL-SCNC: 139 MMOL/L (ref 136–145)
T4 FREE SERPL-MCNC: 0.81 NG/DL (ref 0.76–1.46)
TSH SERPL-ACNC: 1.06 MIU/L (ref 0.36–3.74)
WBC # SPEC AUTO: 5.3 X10(3)/MCL (ref 4.5–11)

## 2020-04-22 ENCOUNTER — HISTORICAL (OUTPATIENT)
Dept: INFUSION THERAPY | Facility: HOSPITAL | Age: 55
End: 2020-04-22

## 2020-04-22 LAB
ABS NEUT (OLG): 2.62 X10(3)/MCL (ref 2.1–9.2)
ALBUMIN SERPL-MCNC: 3.7 GM/DL (ref 3.4–5)
ALBUMIN/GLOB SERPL: 0.9 RATIO (ref 1.1–2)
ALP SERPL-CCNC: 88 UNIT/L (ref 45–117)
ALT SERPL-CCNC: 24 UNIT/L (ref 12–78)
AST SERPL-CCNC: 20 UNIT/L (ref 15–37)
BASOPHILS # BLD AUTO: 0 X10(3)/MCL (ref 0–0.2)
BASOPHILS NFR BLD AUTO: 1 %
BILIRUB SERPL-MCNC: 0.4 MG/DL (ref 0.2–1)
BILIRUBIN DIRECT+TOT PNL SERPL-MCNC: <0.1 MG/DL (ref 0–0.2)
BILIRUBIN DIRECT+TOT PNL SERPL-MCNC: ABNORMAL MG/DL
BUN SERPL-MCNC: 16 MG/DL (ref 7–18)
CALCIUM SERPL-MCNC: 9.4 MG/DL (ref 8.5–10.1)
CHLORIDE SERPL-SCNC: 106 MMOL/L (ref 98–107)
CO2 SERPL-SCNC: 26 MMOL/L (ref 21–32)
CREAT SERPL-MCNC: 0.8 MG/DL (ref 0.6–1.3)
EOSINOPHIL # BLD AUTO: 0.1 X10(3)/MCL (ref 0–0.9)
EOSINOPHIL NFR BLD AUTO: 2 %
ERYTHROCYTE [DISTWIDTH] IN BLOOD BY AUTOMATED COUNT: 19.7 % (ref 11.5–14.5)
GLOBULIN SER-MCNC: 4.2 GM/ML (ref 2.3–3.5)
GLUCOSE SERPL-MCNC: 120 MG/DL (ref 74–106)
HCT VFR BLD AUTO: 40.2 % (ref 35–46)
HGB BLD-MCNC: 12.4 GM/DL (ref 12–16)
LYMPHOCYTES # BLD AUTO: 0.8 X10(3)/MCL (ref 0.6–4.6)
LYMPHOCYTES NFR BLD AUTO: 20 %
MCH RBC QN AUTO: 27.5 PG (ref 26–34)
MCHC RBC AUTO-ENTMCNC: 30.8 GM/DL (ref 31–37)
MCV RBC AUTO: 89.1 FL (ref 80–100)
MONOCYTES # BLD AUTO: 0.5 X10(3)/MCL (ref 0.1–1.3)
MONOCYTES NFR BLD AUTO: 12 %
NEUTROPHILS # BLD AUTO: 2.62 X10(3)/MCL (ref 2.1–9.2)
NEUTROPHILS NFR BLD AUTO: 64 %
PLATELET # BLD AUTO: 416 X10(3)/MCL (ref 130–400)
PMV BLD AUTO: 8.6 FL (ref 7.4–10.4)
POTASSIUM SERPL-SCNC: 4.3 MMOL/L (ref 3.5–5.1)
PROT SERPL-MCNC: 7.9 GM/DL (ref 6.4–8.2)
RBC # BLD AUTO: 4.51 X10(6)/MCL (ref 4–5.2)
SODIUM SERPL-SCNC: 137 MMOL/L (ref 136–145)
WBC # SPEC AUTO: 4.1 X10(3)/MCL (ref 4.5–11)

## 2020-04-24 ENCOUNTER — HISTORICAL (OUTPATIENT)
Dept: RADIOLOGY | Facility: HOSPITAL | Age: 55
End: 2020-04-24

## 2020-04-28 ENCOUNTER — HISTORICAL (OUTPATIENT)
Dept: ADMINISTRATIVE | Facility: HOSPITAL | Age: 55
End: 2020-04-28

## 2020-04-28 LAB
ABS NEUT (OLG): 1.75 X10(3)/MCL (ref 2.1–9.2)
ALBUMIN SERPL-MCNC: 3.3 GM/DL (ref 3.4–5)
ALBUMIN/GLOB SERPL: 0.8 RATIO (ref 1.1–2)
ALP SERPL-CCNC: 90 UNIT/L (ref 45–117)
ALT SERPL-CCNC: 23 UNIT/L (ref 12–78)
AST SERPL-CCNC: 16 UNIT/L (ref 15–37)
BASOPHILS # BLD AUTO: 0 X10(3)/MCL (ref 0–0.2)
BASOPHILS NFR BLD AUTO: 1 %
BILIRUB SERPL-MCNC: 0.3 MG/DL (ref 0.2–1)
BILIRUBIN DIRECT+TOT PNL SERPL-MCNC: 0.1 MG/DL (ref 0–0.2)
BILIRUBIN DIRECT+TOT PNL SERPL-MCNC: 0.2 MG/DL
BUN SERPL-MCNC: 21 MG/DL (ref 7–18)
CALCIUM SERPL-MCNC: 9.3 MG/DL (ref 8.5–10.1)
CHLORIDE SERPL-SCNC: 106 MMOL/L (ref 98–107)
CO2 SERPL-SCNC: 26 MMOL/L (ref 21–32)
CREAT SERPL-MCNC: 1.2 MG/DL (ref 0.6–1.3)
EOSINOPHIL # BLD AUTO: 0 X10(3)/MCL (ref 0–0.9)
EOSINOPHIL NFR BLD AUTO: 1 %
ERYTHROCYTE [DISTWIDTH] IN BLOOD BY AUTOMATED COUNT: 18.4 % (ref 11.5–14.5)
GLOBULIN SER-MCNC: 4.3 GM/ML (ref 2.3–3.5)
GLUCOSE SERPL-MCNC: 104 MG/DL (ref 74–106)
HCT VFR BLD AUTO: 35.1 % (ref 35–46)
HGB BLD-MCNC: 11 GM/DL (ref 12–16)
LYMPHOCYTES # BLD AUTO: 0.6 X10(3)/MCL (ref 0.6–4.6)
LYMPHOCYTES NFR BLD AUTO: 25 %
MCH RBC QN AUTO: 27.4 PG (ref 26–34)
MCHC RBC AUTO-ENTMCNC: 31.3 GM/DL (ref 31–37)
MCV RBC AUTO: 87.3 FL (ref 80–100)
MONOCYTES # BLD AUTO: 0.1 X10(3)/MCL (ref 0.1–1.3)
MONOCYTES NFR BLD AUTO: 6 %
NEUTROPHILS # BLD AUTO: 1.75 X10(3)/MCL (ref 2.1–9.2)
NEUTROPHILS NFR BLD AUTO: 68 %
PLATELET # BLD AUTO: 319 X10(3)/MCL (ref 130–400)
PMV BLD AUTO: 8.1 FL (ref 7.4–10.4)
POTASSIUM SERPL-SCNC: 4.3 MMOL/L (ref 3.5–5.1)
PROT SERPL-MCNC: 7.6 GM/DL (ref 6.4–8.2)
RBC # BLD AUTO: 4.02 X10(6)/MCL (ref 4–5.2)
SODIUM SERPL-SCNC: 138 MMOL/L (ref 136–145)
WBC # SPEC AUTO: 2.6 X10(3)/MCL (ref 4.5–11)

## 2020-04-29 ENCOUNTER — HISTORICAL (OUTPATIENT)
Dept: INFUSION THERAPY | Facility: HOSPITAL | Age: 55
End: 2020-04-29

## 2020-04-30 ENCOUNTER — HISTORICAL (OUTPATIENT)
Dept: RADIOLOGY | Facility: HOSPITAL | Age: 55
End: 2020-04-30

## 2020-05-06 ENCOUNTER — HISTORICAL (OUTPATIENT)
Dept: INFUSION THERAPY | Facility: HOSPITAL | Age: 55
End: 2020-05-06

## 2020-05-06 LAB
ABS NEUT (OLG): 1 X10(3)/MCL (ref 2.1–9.2)
ALBUMIN SERPL-MCNC: 3.2 GM/DL (ref 3.4–5)
ALBUMIN/GLOB SERPL: 0.7 RATIO (ref 1.1–2)
ALP SERPL-CCNC: 85 UNIT/L (ref 45–117)
ALT SERPL-CCNC: 18 UNIT/L (ref 12–78)
AST SERPL-CCNC: 13 UNIT/L (ref 15–37)
BASOPHILS # BLD AUTO: 0 X10(3)/MCL (ref 0–0.2)
BASOPHILS NFR BLD AUTO: 1 %
BILIRUB SERPL-MCNC: 0.2 MG/DL (ref 0.2–1)
BILIRUBIN DIRECT+TOT PNL SERPL-MCNC: <0.1 MG/DL (ref 0–0.2)
BILIRUBIN DIRECT+TOT PNL SERPL-MCNC: ABNORMAL MG/DL
BUN SERPL-MCNC: 12 MG/DL (ref 7–18)
CALCIUM SERPL-MCNC: 9.3 MG/DL (ref 8.5–10.1)
CHLORIDE SERPL-SCNC: 108 MMOL/L (ref 98–107)
CO2 SERPL-SCNC: 28 MMOL/L (ref 21–32)
CREAT SERPL-MCNC: 0.9 MG/DL (ref 0.6–1.3)
EOSINOPHIL # BLD AUTO: 0 X10(3)/MCL (ref 0–0.9)
EOSINOPHIL NFR BLD AUTO: 1 %
ERYTHROCYTE [DISTWIDTH] IN BLOOD BY AUTOMATED COUNT: 18.6 % (ref 11.5–14.5)
GLOBULIN SER-MCNC: 4.4 GM/ML (ref 2.3–3.5)
GLUCOSE SERPL-MCNC: 90 MG/DL (ref 74–106)
HCT VFR BLD AUTO: 36.9 % (ref 35–46)
HGB BLD-MCNC: 11.3 GM/DL (ref 12–16)
IMM GRANULOCYTES # BLD AUTO: 0.01 10*3/UL
IMM GRANULOCYTES NFR BLD AUTO: 1 %
LYMPHOCYTES # BLD AUTO: 0.6 X10(3)/MCL (ref 0.6–4.6)
LYMPHOCYTES NFR BLD AUTO: 32 %
MCH RBC QN AUTO: 27.1 PG (ref 26–34)
MCHC RBC AUTO-ENTMCNC: 30.6 GM/DL (ref 31–37)
MCV RBC AUTO: 88.5 FL (ref 80–100)
MONOCYTES # BLD AUTO: 0.2 X10(3)/MCL (ref 0.1–1.3)
MONOCYTES NFR BLD AUTO: 11 %
NEUTROPHILS # BLD AUTO: 1 X10(3)/MCL (ref 2.1–9.2)
NEUTROPHILS NFR BLD AUTO: 55 %
PLATELET # BLD AUTO: 183 X10(3)/MCL (ref 130–400)
PMV BLD AUTO: 8.4 FL (ref 7.4–10.4)
POTASSIUM SERPL-SCNC: 4 MMOL/L (ref 3.5–5.1)
PROT SERPL-MCNC: 7.6 GM/DL (ref 6.4–8.2)
RBC # BLD AUTO: 4.17 X10(6)/MCL (ref 4–5.2)
SODIUM SERPL-SCNC: 137 MMOL/L (ref 136–145)
WBC # SPEC AUTO: 1.8 X10(3)/MCL (ref 4.5–11)

## 2020-05-14 ENCOUNTER — HISTORICAL (OUTPATIENT)
Dept: ADMINISTRATIVE | Facility: HOSPITAL | Age: 55
End: 2020-05-14

## 2020-05-14 LAB
ABS NEUT (OLG): 1.17 X10(3)/MCL (ref 2.1–9.2)
ALBUMIN SERPL-MCNC: 3.1 GM/DL (ref 3.4–5)
ALBUMIN/GLOB SERPL: 0.8 RATIO (ref 1.1–2)
ALP SERPL-CCNC: 84 UNIT/L (ref 45–117)
ALT SERPL-CCNC: 22 UNIT/L (ref 12–78)
ANISOCYTOSIS BLD QL SMEAR: ABNORMAL
AST SERPL-CCNC: 14 UNIT/L (ref 15–37)
BASOPHILS NFR BLD MANUAL: 0 %
BILIRUB SERPL-MCNC: 0.2 MG/DL (ref 0.2–1)
BILIRUBIN DIRECT+TOT PNL SERPL-MCNC: <0.1 MG/DL (ref 0–0.2)
BILIRUBIN DIRECT+TOT PNL SERPL-MCNC: ABNORMAL MG/DL
BUN SERPL-MCNC: 11 MG/DL (ref 7–18)
CALCIUM SERPL-MCNC: 8.7 MG/DL (ref 8.5–10.1)
CHLORIDE SERPL-SCNC: 111 MMOL/L (ref 98–107)
CO2 SERPL-SCNC: 25 MMOL/L (ref 21–32)
CREAT SERPL-MCNC: 0.8 MG/DL (ref 0.6–1.3)
EOSINOPHIL NFR BLD MANUAL: 2 %
ERYTHROCYTE [DISTWIDTH] IN BLOOD BY AUTOMATED COUNT: 19.9 % (ref 11.5–14.5)
GLOBULIN SER-MCNC: 4 GM/ML (ref 2.3–3.5)
GLUCOSE SERPL-MCNC: 126 MG/DL (ref 74–106)
GRANULOCYTES NFR BLD MANUAL: 60 % (ref 43–75)
HCT VFR BLD AUTO: 32.8 % (ref 35–46)
HGB BLD-MCNC: 10.1 GM/DL (ref 12–16)
HYPOCHROMIA BLD QL SMEAR: ABNORMAL
LYMPHOCYTES NFR BLD MANUAL: 20 % (ref 20.5–51.1)
MACROCYTES BLD QL SMEAR: ABNORMAL
MCH RBC QN AUTO: 27.6 PG (ref 26–34)
MCHC RBC AUTO-ENTMCNC: 30.8 GM/DL (ref 31–37)
MCV RBC AUTO: 89.6 FL (ref 80–100)
MICROCYTES BLD QL SMEAR: ABNORMAL
MONOCYTES NFR BLD MANUAL: 18 % (ref 2–9)
PLATELET # BLD AUTO: 270 X10(3)/MCL (ref 130–400)
PLATELET # BLD EST: ADEQUATE 10*3/UL
PMV BLD AUTO: 8.1 FL (ref 7.4–10.4)
POTASSIUM SERPL-SCNC: 3.8 MMOL/L (ref 3.5–5.1)
PROT SERPL-MCNC: 7.1 GM/DL (ref 6.4–8.2)
RBC # BLD AUTO: 3.66 X10(6)/MCL (ref 4–5.2)
RBC MORPH BLD: ABNORMAL
SODIUM SERPL-SCNC: 142 MMOL/L (ref 136–145)
WBC # SPEC AUTO: 2.2 X10(3)/MCL (ref 4.5–11)

## 2020-05-20 ENCOUNTER — HISTORICAL (OUTPATIENT)
Dept: RADIOLOGY | Facility: HOSPITAL | Age: 55
End: 2020-05-20

## 2020-05-20 ENCOUNTER — HISTORICAL (OUTPATIENT)
Dept: INFUSION THERAPY | Facility: HOSPITAL | Age: 55
End: 2020-05-20

## 2020-05-20 LAB
ABS NEUT (OLG): 0.05 X10(3)/MCL (ref 2.1–9.2)
ALBUMIN SERPL-MCNC: 3.4 GM/DL (ref 3.4–5)
ALBUMIN/GLOB SERPL: 0.8 RATIO (ref 1.1–2)
ALP SERPL-CCNC: 95 UNIT/L (ref 45–117)
ALT SERPL-CCNC: 20 UNIT/L (ref 12–78)
ANISOCYTOSIS BLD QL SMEAR: ABNORMAL
AST SERPL-CCNC: 14 UNIT/L (ref 15–37)
BASOPHILS NFR BLD MANUAL: 0 %
BILIRUB SERPL-MCNC: 0.3 MG/DL (ref 0.2–1)
BILIRUBIN DIRECT+TOT PNL SERPL-MCNC: <0.1 MG/DL (ref 0–0.2)
BILIRUBIN DIRECT+TOT PNL SERPL-MCNC: ABNORMAL MG/DL
BUN SERPL-MCNC: 11 MG/DL (ref 7–18)
CALCIUM SERPL-MCNC: 9.4 MG/DL (ref 8.5–10.1)
CHLORIDE SERPL-SCNC: 105 MMOL/L (ref 98–107)
CO2 SERPL-SCNC: 29 MMOL/L (ref 21–32)
CREAT SERPL-MCNC: 0.9 MG/DL (ref 0.6–1.3)
EOSINOPHIL NFR BLD MANUAL: 0 %
ERYTHROCYTE [DISTWIDTH] IN BLOOD BY AUTOMATED COUNT: 19.9 % (ref 11.5–14.5)
GLOBULIN SER-MCNC: 4.2 GM/ML (ref 2.3–3.5)
GLUCOSE SERPL-MCNC: 106 MG/DL (ref 74–106)
GRANULOCYTES NFR BLD MANUAL: 1 % (ref 43–75)
HCT VFR BLD AUTO: 36.2 % (ref 35–46)
HGB BLD-MCNC: 11 GM/DL (ref 12–16)
LYMPHOCYTES NFR BLD MANUAL: 1 %
LYMPHOCYTES NFR BLD MANUAL: 78 % (ref 20.5–51.1)
MCH RBC QN AUTO: 27.3 PG (ref 26–34)
MCHC RBC AUTO-ENTMCNC: 30.4 GM/DL (ref 31–37)
MCV RBC AUTO: 89.8 FL (ref 80–100)
MONOCYTES NFR BLD MANUAL: 20 % (ref 2–9)
PLATELET # BLD AUTO: 469 X10(3)/MCL (ref 130–400)
PLATELET # BLD EST: ABNORMAL 10*3/UL
PMV BLD AUTO: 8.8 FL (ref 7.4–10.4)
POTASSIUM SERPL-SCNC: 3.9 MMOL/L (ref 3.5–5.1)
PROT SERPL-MCNC: 7.6 GM/DL (ref 6.4–8.2)
RBC # BLD AUTO: 4.03 X10(6)/MCL (ref 4–5.2)
RBC MORPH BLD: ABNORMAL
SODIUM SERPL-SCNC: 139 MMOL/L (ref 136–145)
WBC # SPEC AUTO: 3.9 X10(3)/MCL (ref 4.5–11)

## 2020-05-26 ENCOUNTER — HISTORICAL (OUTPATIENT)
Dept: ADMINISTRATIVE | Facility: HOSPITAL | Age: 55
End: 2020-05-26

## 2020-06-04 ENCOUNTER — HISTORICAL (OUTPATIENT)
Dept: ADMINISTRATIVE | Facility: HOSPITAL | Age: 55
End: 2020-06-04

## 2020-06-04 LAB
ABS NEUT (OLG): 4.71 X10(3)/MCL (ref 2.1–9.2)
ALBUMIN SERPL-MCNC: 3.1 GM/DL (ref 3.4–5)
ALBUMIN/GLOB SERPL: 0.8 RATIO (ref 1.1–2)
ALP SERPL-CCNC: 82 UNIT/L (ref 45–117)
ALT SERPL-CCNC: 14 UNIT/L (ref 12–78)
AST SERPL-CCNC: 12 UNIT/L (ref 15–37)
BASOPHILS # BLD AUTO: 0 X10(3)/MCL (ref 0–0.2)
BASOPHILS NFR BLD AUTO: 1 %
BILIRUB SERPL-MCNC: 0.2 MG/DL (ref 0.2–1)
BILIRUBIN DIRECT+TOT PNL SERPL-MCNC: <0.1 MG/DL (ref 0–0.2)
BILIRUBIN DIRECT+TOT PNL SERPL-MCNC: ABNORMAL MG/DL
BUN SERPL-MCNC: 14 MG/DL (ref 7–18)
CALCIUM SERPL-MCNC: 9.1 MG/DL (ref 8.5–10.1)
CHLORIDE SERPL-SCNC: 108 MMOL/L (ref 98–107)
CO2 SERPL-SCNC: 29 MMOL/L (ref 21–32)
CREAT SERPL-MCNC: 0.9 MG/DL (ref 0.6–1.3)
EOSINOPHIL # BLD AUTO: 0.1 X10(3)/MCL (ref 0–0.9)
EOSINOPHIL NFR BLD AUTO: 1 %
ERYTHROCYTE [DISTWIDTH] IN BLOOD BY AUTOMATED COUNT: 19.7 % (ref 11.5–14.5)
GLOBULIN SER-MCNC: 4.1 GM/ML (ref 2.3–3.5)
GLUCOSE SERPL-MCNC: 101 MG/DL (ref 74–106)
HCT VFR BLD AUTO: 37.9 % (ref 35–46)
HGB BLD-MCNC: 11.5 GM/DL (ref 12–16)
IMM GRANULOCYTES # BLD AUTO: 0.03 10*3/UL
IMM GRANULOCYTES NFR BLD AUTO: 0 %
LYMPHOCYTES # BLD AUTO: 1.1 X10(3)/MCL (ref 0.6–4.6)
LYMPHOCYTES NFR BLD AUTO: 16 %
MCH RBC QN AUTO: 27.4 PG (ref 26–34)
MCHC RBC AUTO-ENTMCNC: 30.3 GM/DL (ref 31–37)
MCV RBC AUTO: 90.2 FL (ref 80–100)
MONOCYTES # BLD AUTO: 0.7 X10(3)/MCL (ref 0.1–1.3)
MONOCYTES NFR BLD AUTO: 10 %
NEUTROPHILS # BLD AUTO: 4.71 X10(3)/MCL (ref 2.1–9.2)
NEUTROPHILS NFR BLD AUTO: 71 %
PLATELET # BLD AUTO: 311 X10(3)/MCL (ref 130–400)
PMV BLD AUTO: 8.4 FL (ref 7.4–10.4)
POTASSIUM SERPL-SCNC: 4.3 MMOL/L (ref 3.5–5.1)
PROT SERPL-MCNC: 7.2 GM/DL (ref 6.4–8.2)
RBC # BLD AUTO: 4.2 X10(6)/MCL (ref 4–5.2)
SODIUM SERPL-SCNC: 139 MMOL/L (ref 136–145)
WBC # SPEC AUTO: 6.6 X10(3)/MCL (ref 4.5–11)

## 2020-06-08 ENCOUNTER — HISTORICAL (OUTPATIENT)
Dept: INFUSION THERAPY | Facility: HOSPITAL | Age: 55
End: 2020-06-08

## 2020-06-08 LAB
ABS NEUT (OLG): 3.35 X10(3)/MCL (ref 2.1–9.2)
ALBUMIN SERPL-MCNC: 3.3 GM/DL (ref 3.4–5)
ALBUMIN/GLOB SERPL: 0.8 RATIO (ref 1.1–2)
ALP SERPL-CCNC: 86 UNIT/L (ref 45–117)
ALT SERPL-CCNC: 16 UNIT/L (ref 12–78)
AST SERPL-CCNC: 14 UNIT/L (ref 15–37)
BASOPHILS # BLD AUTO: 0 X10(3)/MCL (ref 0–0.2)
BASOPHILS NFR BLD AUTO: 0 %
BILIRUB SERPL-MCNC: 0.3 MG/DL (ref 0.2–1)
BILIRUBIN DIRECT+TOT PNL SERPL-MCNC: <0.1 MG/DL (ref 0–0.2)
BILIRUBIN DIRECT+TOT PNL SERPL-MCNC: ABNORMAL MG/DL
BUN SERPL-MCNC: 15 MG/DL (ref 7–18)
CALCIUM SERPL-MCNC: 9.5 MG/DL (ref 8.5–10.1)
CHLORIDE SERPL-SCNC: 109 MMOL/L (ref 98–107)
CO2 SERPL-SCNC: 29 MMOL/L (ref 21–32)
CREAT SERPL-MCNC: 0.8 MG/DL (ref 0.6–1.3)
EOSINOPHIL # BLD AUTO: 0.1 X10(3)/MCL (ref 0–0.9)
EOSINOPHIL NFR BLD AUTO: 2 %
ERYTHROCYTE [DISTWIDTH] IN BLOOD BY AUTOMATED COUNT: 19.5 % (ref 11.5–14.5)
GLOBULIN SER-MCNC: 4.3 GM/ML (ref 2.3–3.5)
GLUCOSE SERPL-MCNC: 102 MG/DL (ref 74–106)
HCT VFR BLD AUTO: 38.4 % (ref 35–46)
HGB BLD-MCNC: 11.6 GM/DL (ref 12–16)
IMM GRANULOCYTES # BLD AUTO: 0.01 10*3/UL
IMM GRANULOCYTES NFR BLD AUTO: 0 %
LYMPHOCYTES # BLD AUTO: 1 X10(3)/MCL (ref 0.6–4.6)
LYMPHOCYTES NFR BLD AUTO: 20 %
MCH RBC QN AUTO: 27.2 PG (ref 26–34)
MCHC RBC AUTO-ENTMCNC: 30.2 GM/DL (ref 31–37)
MCV RBC AUTO: 90.1 FL (ref 80–100)
MONOCYTES # BLD AUTO: 0.7 X10(3)/MCL (ref 0.1–1.3)
MONOCYTES NFR BLD AUTO: 13 %
NEUTROPHILS # BLD AUTO: 3.35 X10(3)/MCL (ref 2.1–9.2)
NEUTROPHILS NFR BLD AUTO: 65 %
PLATELET # BLD AUTO: 360 X10(3)/MCL (ref 130–400)
PMV BLD AUTO: 8.7 FL (ref 7.4–10.4)
POTASSIUM SERPL-SCNC: 4.4 MMOL/L (ref 3.5–5.1)
PROT SERPL-MCNC: 7.6 GM/DL (ref 6.4–8.2)
RBC # BLD AUTO: 4.26 X10(6)/MCL (ref 4–5.2)
SODIUM SERPL-SCNC: 141 MMOL/L (ref 136–145)
WBC # SPEC AUTO: 5.1 X10(3)/MCL (ref 4.5–11)

## 2020-06-11 ENCOUNTER — HISTORICAL (OUTPATIENT)
Dept: RADIATION THERAPY | Facility: HOSPITAL | Age: 55
End: 2020-06-11

## 2020-06-15 ENCOUNTER — HISTORICAL (OUTPATIENT)
Dept: INFUSION THERAPY | Facility: HOSPITAL | Age: 55
End: 2020-06-15

## 2020-06-15 LAB
ABS NEUT (OLG): 1.58 X10(3)/MCL (ref 2.1–9.2)
ALBUMIN SERPL-MCNC: 3.3 GM/DL (ref 3.4–5)
ALBUMIN/GLOB SERPL: 0.8 RATIO (ref 1.1–2)
ALP SERPL-CCNC: 91 UNIT/L (ref 45–117)
ALT SERPL-CCNC: 21 UNIT/L (ref 12–78)
AST SERPL-CCNC: 17 UNIT/L (ref 15–37)
BASOPHILS # BLD AUTO: 0 X10(3)/MCL (ref 0–0.2)
BASOPHILS NFR BLD AUTO: 1 %
BILIRUB SERPL-MCNC: 0.4 MG/DL (ref 0.2–1)
BILIRUBIN DIRECT+TOT PNL SERPL-MCNC: 0.1 MG/DL (ref 0–0.2)
BILIRUBIN DIRECT+TOT PNL SERPL-MCNC: 0.3 MG/DL
BUN SERPL-MCNC: 16 MG/DL (ref 7–18)
CALCIUM SERPL-MCNC: 8.8 MG/DL (ref 8.5–10.1)
CHLORIDE SERPL-SCNC: 109 MMOL/L (ref 98–107)
CO2 SERPL-SCNC: 26 MMOL/L (ref 21–32)
CREAT SERPL-MCNC: 0.7 MG/DL (ref 0.6–1.3)
EOSINOPHIL # BLD AUTO: 0 X10(3)/MCL (ref 0–0.9)
EOSINOPHIL NFR BLD AUTO: 2 %
ERYTHROCYTE [DISTWIDTH] IN BLOOD BY AUTOMATED COUNT: 19 % (ref 11.5–14.5)
GLOBULIN SER-MCNC: 4.1 GM/ML (ref 2.3–3.5)
GLUCOSE SERPL-MCNC: 97 MG/DL (ref 74–106)
HCT VFR BLD AUTO: 36.8 % (ref 35–46)
HGB BLD-MCNC: 11.2 GM/DL (ref 12–16)
LYMPHOCYTES # BLD AUTO: 0.7 X10(3)/MCL (ref 0.6–4.6)
LYMPHOCYTES NFR BLD AUTO: 28 %
MCH RBC QN AUTO: 27.1 PG (ref 26–34)
MCHC RBC AUTO-ENTMCNC: 30.4 GM/DL (ref 31–37)
MCV RBC AUTO: 89.1 FL (ref 80–100)
MONOCYTES # BLD AUTO: 0.3 X10(3)/MCL (ref 0.1–1.3)
MONOCYTES NFR BLD AUTO: 10 %
NEUTROPHILS # BLD AUTO: 1.58 X10(3)/MCL (ref 2.1–9.2)
NEUTROPHILS NFR BLD AUTO: 59 %
PLATELET # BLD AUTO: 312 X10(3)/MCL (ref 130–400)
PMV BLD AUTO: 8.7 FL (ref 7.4–10.4)
POTASSIUM SERPL-SCNC: 4.2 MMOL/L (ref 3.5–5.1)
PROT SERPL-MCNC: 7.4 GM/DL (ref 6.4–8.2)
RBC # BLD AUTO: 4.13 X10(6)/MCL (ref 4–5.2)
SODIUM SERPL-SCNC: 139 MMOL/L (ref 136–145)
WBC # SPEC AUTO: 2.7 X10(3)/MCL (ref 4.5–11)

## 2020-06-22 ENCOUNTER — HISTORICAL (OUTPATIENT)
Dept: INFUSION THERAPY | Facility: HOSPITAL | Age: 55
End: 2020-06-22

## 2020-06-22 LAB
ABS NEUT (OLG): 1.11 X10(3)/MCL (ref 2.1–9.2)
ALBUMIN SERPL-MCNC: 3.5 GM/DL (ref 3.4–5)
ALBUMIN/GLOB SERPL: 0.8 RATIO (ref 1.1–2)
ALP SERPL-CCNC: 84 UNIT/L (ref 45–117)
ALT SERPL-CCNC: 45 UNIT/L (ref 12–78)
ANISOCYTOSIS BLD QL SMEAR: ABNORMAL
AST SERPL-CCNC: 28 UNIT/L (ref 15–37)
BASOPHILS NFR BLD MANUAL: 1 %
BILIRUB SERPL-MCNC: 0.3 MG/DL (ref 0.2–1)
BILIRUBIN DIRECT+TOT PNL SERPL-MCNC: <0.1 MG/DL (ref 0–0.2)
BILIRUBIN DIRECT+TOT PNL SERPL-MCNC: ABNORMAL MG/DL
BUN SERPL-MCNC: 14 MG/DL (ref 7–18)
CALCIUM SERPL-MCNC: 9.2 MG/DL (ref 8.5–10.1)
CHLORIDE SERPL-SCNC: 102 MMOL/L (ref 98–107)
CO2 SERPL-SCNC: 28 MMOL/L (ref 21–32)
CREAT SERPL-MCNC: 0.8 MG/DL (ref 0.6–1.3)
EOSINOPHIL NFR BLD MANUAL: 0 %
ERYTHROCYTE [DISTWIDTH] IN BLOOD BY AUTOMATED COUNT: 19.5 % (ref 11.5–14.5)
GLOBULIN SER-MCNC: 4.4 GM/ML (ref 2.3–3.5)
GLUCOSE SERPL-MCNC: 98 MG/DL (ref 74–106)
GRANULOCYTES NFR BLD MANUAL: 54 % (ref 43–75)
HCT VFR BLD AUTO: 37.3 % (ref 35–46)
HGB BLD-MCNC: 11.5 GM/DL (ref 12–16)
LYMPHOCYTES NFR BLD MANUAL: 3 %
LYMPHOCYTES NFR BLD MANUAL: 33 % (ref 20.5–51.1)
MAGNESIUM SERPL-MCNC: 2 MG/DL (ref 1.6–2.6)
MCH RBC QN AUTO: 27.4 PG (ref 26–34)
MCHC RBC AUTO-ENTMCNC: 30.8 GM/DL (ref 31–37)
MCV RBC AUTO: 88.8 FL (ref 80–100)
MONOCYTES NFR BLD MANUAL: 8 % (ref 2–9)
NEUTS BAND NFR BLD MANUAL: 1 % (ref 0–10)
PLATELET # BLD AUTO: 166 X10(3)/MCL (ref 130–400)
PLATELET # BLD EST: ADEQUATE 10*3/UL
PMV BLD AUTO: 7.9 FL (ref 7.4–10.4)
POLYCHROMASIA BLD QL SMEAR: ABNORMAL
POTASSIUM SERPL-SCNC: 4 MMOL/L (ref 3.5–5.1)
PROT SERPL-MCNC: 7.9 GM/DL (ref 6.4–8.2)
RBC # BLD AUTO: 4.2 X10(6)/MCL (ref 4–5.2)
RBC MORPH BLD: ABNORMAL
SODIUM SERPL-SCNC: 136 MMOL/L (ref 136–145)
WBC # SPEC AUTO: 2.1 X10(3)/MCL (ref 4.5–11)

## 2020-06-29 ENCOUNTER — HISTORICAL (OUTPATIENT)
Dept: RADIOLOGY | Facility: HOSPITAL | Age: 55
End: 2020-06-29

## 2020-07-06 ENCOUNTER — HISTORICAL (OUTPATIENT)
Dept: INFUSION THERAPY | Facility: HOSPITAL | Age: 55
End: 2020-07-06

## 2020-07-06 LAB
ABS NEUT (OLG): 3.64 X10(3)/MCL (ref 2.1–9.2)
ALBUMIN SERPL-MCNC: 3.5 GM/DL (ref 3.4–5)
ALBUMIN/GLOB SERPL: 0.8 RATIO (ref 1.1–2)
ALP SERPL-CCNC: 97 UNIT/L (ref 45–117)
ALT SERPL-CCNC: 33 UNIT/L (ref 12–78)
AST SERPL-CCNC: 15 UNIT/L (ref 15–37)
BASOPHILS # BLD AUTO: 0 X10(3)/MCL (ref 0–0.2)
BASOPHILS NFR BLD AUTO: 0 %
BILIRUB SERPL-MCNC: 0.3 MG/DL (ref 0.2–1)
BILIRUBIN DIRECT+TOT PNL SERPL-MCNC: <0.1 MG/DL (ref 0–0.2)
BILIRUBIN DIRECT+TOT PNL SERPL-MCNC: ABNORMAL MG/DL
BUN SERPL-MCNC: 18 MG/DL (ref 7–18)
CALCIUM SERPL-MCNC: 9.3 MG/DL (ref 8.5–10.1)
CHLORIDE SERPL-SCNC: 108 MMOL/L (ref 98–107)
CO2 SERPL-SCNC: 27 MMOL/L (ref 21–32)
CREAT SERPL-MCNC: 0.7 MG/DL (ref 0.6–1.3)
EOSINOPHIL # BLD AUTO: 0.1 X10(3)/MCL (ref 0–0.9)
EOSINOPHIL NFR BLD AUTO: 1 %
ERYTHROCYTE [DISTWIDTH] IN BLOOD BY AUTOMATED COUNT: 20.3 % (ref 11.5–14.5)
GLOBULIN SER-MCNC: 4.3 GM/ML (ref 2.3–3.5)
GLUCOSE SERPL-MCNC: 109 MG/DL (ref 74–106)
HCT VFR BLD AUTO: 38.8 % (ref 35–46)
HGB BLD-MCNC: 11.7 GM/DL (ref 12–16)
IMM GRANULOCYTES # BLD AUTO: 0.01 10*3/UL
IMM GRANULOCYTES NFR BLD AUTO: 0 %
LYMPHOCYTES # BLD AUTO: 0.9 X10(3)/MCL (ref 0.6–4.6)
LYMPHOCYTES NFR BLD AUTO: 16 %
MAGNESIUM SERPL-MCNC: 2.1 MG/DL (ref 1.6–2.6)
MCH RBC QN AUTO: 27.4 PG (ref 26–34)
MCHC RBC AUTO-ENTMCNC: 30.2 GM/DL (ref 31–37)
MCV RBC AUTO: 90.9 FL (ref 80–100)
MONOCYTES # BLD AUTO: 1 X10(3)/MCL (ref 0.1–1.3)
MONOCYTES NFR BLD AUTO: 17 %
NEUTROPHILS # BLD AUTO: 3.64 X10(3)/MCL (ref 2.1–9.2)
NEUTROPHILS NFR BLD AUTO: 65 %
PLATELET # BLD AUTO: 560 X10(3)/MCL (ref 130–400)
PMV BLD AUTO: 8.6 FL (ref 7.4–10.4)
POTASSIUM SERPL-SCNC: 4.2 MMOL/L (ref 3.5–5.1)
PROT SERPL-MCNC: 7.8 GM/DL (ref 6.4–8.2)
RBC # BLD AUTO: 4.27 X10(6)/MCL (ref 4–5.2)
SODIUM SERPL-SCNC: 138 MMOL/L (ref 136–145)
WBC # SPEC AUTO: 5.6 X10(3)/MCL (ref 4.5–11)

## 2020-07-13 ENCOUNTER — HISTORICAL (OUTPATIENT)
Dept: INFUSION THERAPY | Facility: HOSPITAL | Age: 55
End: 2020-07-13

## 2020-07-13 LAB
ALBUMIN SERPL-MCNC: 3.3 GM/DL (ref 3.4–5)
ALBUMIN/GLOB SERPL: 0.8 RATIO (ref 1.1–2)
ALP SERPL-CCNC: 89 UNIT/L (ref 45–117)
ALT SERPL-CCNC: 55 UNIT/L (ref 12–78)
AST SERPL-CCNC: 20 UNIT/L (ref 15–37)
BILIRUB SERPL-MCNC: 0.2 MG/DL (ref 0.2–1)
BILIRUBIN DIRECT+TOT PNL SERPL-MCNC: <0.1 MG/DL (ref 0–0.2)
BILIRUBIN DIRECT+TOT PNL SERPL-MCNC: ABNORMAL MG/DL
BUN SERPL-MCNC: 15 MG/DL (ref 7–18)
CALCIUM SERPL-MCNC: 9 MG/DL (ref 8.5–10.1)
CHLORIDE SERPL-SCNC: 108 MMOL/L (ref 98–107)
CO2 SERPL-SCNC: 29 MMOL/L (ref 21–32)
CREAT SERPL-MCNC: 0.8 MG/DL (ref 0.6–1.3)
GLOBULIN SER-MCNC: 4 GM/ML (ref 2.3–3.5)
GLUCOSE SERPL-MCNC: 100 MG/DL (ref 74–106)
POTASSIUM SERPL-SCNC: 4.1 MMOL/L (ref 3.5–5.1)
PROT SERPL-MCNC: 7.3 GM/DL (ref 6.4–8.2)
SODIUM SERPL-SCNC: 141 MMOL/L (ref 136–145)

## 2020-07-20 ENCOUNTER — HISTORICAL (OUTPATIENT)
Dept: INFUSION THERAPY | Facility: HOSPITAL | Age: 55
End: 2020-07-20

## 2020-09-10 ENCOUNTER — HISTORICAL (OUTPATIENT)
Dept: RADIOLOGY | Facility: HOSPITAL | Age: 55
End: 2020-09-10

## 2020-09-16 ENCOUNTER — HISTORICAL (OUTPATIENT)
Dept: INFUSION THERAPY | Facility: HOSPITAL | Age: 55
End: 2020-09-16

## 2020-09-23 ENCOUNTER — HISTORICAL (OUTPATIENT)
Dept: INFUSION THERAPY | Facility: HOSPITAL | Age: 55
End: 2020-09-23

## 2020-09-30 ENCOUNTER — HISTORICAL (OUTPATIENT)
Dept: INFUSION THERAPY | Facility: HOSPITAL | Age: 55
End: 2020-09-30

## 2020-10-14 ENCOUNTER — HISTORICAL (OUTPATIENT)
Dept: INFUSION THERAPY | Facility: HOSPITAL | Age: 55
End: 2020-10-14

## 2020-10-21 ENCOUNTER — HISTORICAL (OUTPATIENT)
Dept: INFUSION THERAPY | Facility: HOSPITAL | Age: 55
End: 2020-10-21

## 2020-10-28 ENCOUNTER — HISTORICAL (OUTPATIENT)
Dept: INFUSION THERAPY | Facility: HOSPITAL | Age: 55
End: 2020-10-28

## 2020-11-09 ENCOUNTER — HISTORICAL (OUTPATIENT)
Dept: RADIOLOGY | Facility: HOSPITAL | Age: 55
End: 2020-11-09

## 2020-11-11 ENCOUNTER — HISTORICAL (OUTPATIENT)
Dept: INFUSION THERAPY | Facility: HOSPITAL | Age: 55
End: 2020-11-11

## 2020-11-18 ENCOUNTER — HISTORICAL (OUTPATIENT)
Dept: INFUSION THERAPY | Facility: HOSPITAL | Age: 55
End: 2020-11-18

## 2020-12-09 ENCOUNTER — HISTORICAL (OUTPATIENT)
Dept: INFUSION THERAPY | Facility: HOSPITAL | Age: 55
End: 2020-12-09

## 2020-12-11 ENCOUNTER — TELEPHONE (OUTPATIENT)
Dept: HEMATOLOGY/ONCOLOGY | Facility: CLINIC | Age: 55
End: 2020-12-11

## 2020-12-11 NOTE — TELEPHONE ENCOUNTER
TC to pt to inquire as to why we received her records from [facility]  Stated she has recently moved to [place] and has had [facility] in the past and needs to establish care  She does not have an [facility] PCP  And she has [facility] dual complete  Advised her due the fact she [facility] and her insurance not being accepted at this facility she will not be able to get an appointment scheduled  Instructed her she can contact the oncology group at [facility] and see if they can accept her  She requested nurse to disregard referral as she will go back to [place]

## 2020-12-18 ENCOUNTER — HISTORICAL (OUTPATIENT)
Dept: INFUSION THERAPY | Facility: HOSPITAL | Age: 55
End: 2020-12-18

## 2022-04-07 ENCOUNTER — HISTORICAL (OUTPATIENT)
Dept: ADMINISTRATIVE | Facility: HOSPITAL | Age: 57
End: 2022-04-07
Payer: MEDICAID

## 2022-04-23 VITALS
WEIGHT: 154.31 LBS | HEIGHT: 61 IN | SYSTOLIC BLOOD PRESSURE: 158 MMHG | DIASTOLIC BLOOD PRESSURE: 92 MMHG | OXYGEN SATURATION: 100 % | BODY MASS INDEX: 29.13 KG/M2

## 2022-04-30 NOTE — DISCHARGE SUMMARY
Patient:   Aundrea Palacio            MRN: [id number]            FIN: [id number]               Age:   53 years     Sex:  Female     :  1965   Associated Diagnoses:   None   Author:   Raven Couch MD      ADMISSION DATE: 2019  DISCHARGE DATE: 1/10/2019  ATTENDING PHYSICIAN: DR. Bradley Herrera    REFERRING PHYSICIAN:  ER  CONDITION ON DISCHARGE: Stable    FINAL DIAGNOSIS:   Stage IIIC Triple negative breast cancer , S/P Lumpectomy left breast with Level I axillary lymph node dissection.  PROCEDURES:    2019   The patient was taken to the operating room and placed supine on the operating table.  General endotracheal anesthesia was induced.  The patient was positioned with the left arm extended to the side.  A time-out was completed, verifying the correct patient, procedure, and perioperative antibiotics.       An approximately 6 cm incision was made in the area of the mass and axilla.  Incision was deepened through skin and then into breast tissue.  The invasive cancer was dissected out and resected.  We then focused our efforts on the axilla.  We were able to cut down on the axilla using our nuclear medicine probe.  We did not identify any specific hot nodes.  We also did not note the presence of blue dye in the axilla.  We then elected to proceed with a level 1 axillary node dissection.  This was completed without incident.  We did identify the thoracodorsal nerve in the course of our dissection.  All nodes were sent to Pathology for analysis.       At the completion of our dissection, the deep space of the axilla was reapproximated with a 2-0 Vicryl suture.  The dermis was reapproximated with interrupted 3-0 Vicryl stitches.  The skin was closed with a running Monocryl suture.  The patient was then awakened from anesthesia, extubated, and transferred to recovery in stable condition.        HISTORY OF PRESENT ILLNESS :  53yF hx of left breast cancer, triple negative, dx 2017,  initially treated with neoadjuvant chemotherapy with follow up staging mid-treatment, found to have left supraclavicular lymph node, and changed to ciplatin based chemotherapy. Last chemo sept 2018.    Patient has clinically negative axilla. Referred for ultrasound. Ultrasound (12/2018) again confirmed the presence of a 2.2cm mass in the left breast at the 2:00 position. Two left axillary lymph nodes lacking discrete fatty sami were identified. These did not appear pathologically enlarged.    PET scan shows (12/2018): Hypermetabolic lobulated mass in the upper outer quadrant of the left breast. Small left supraclavicular lymph node with equivocal radiotracer activity. No intrathoracic, intra-abdominal, intrapelvic or osseous metastasis is identified.    Breast cancer risk profile:  Menarche- 12 yrs  Menopause- 40 yrs (hysterectomy)  3 children, first child at 15yrs  Birth control use- 3-4yrs  PROCEDURES:    1/9/2019   The patient was taken to the operating room and placed supine on the operating table.  General endotracheal anesthesia was induced.  The patient was positioned with the left arm extended to the side.  A time-out was completed, verifying the correct patient, procedure, and perioperative antibiotics.       An approximately 6 cm incision was made in the area of the mass and axilla.  Incision was deepened through skin and then into breast tissue.  The invasive cancer was dissected out and resected.  We then focused our efforts on the axilla.  We were able to cut down on the axilla using our nuclear medicine probe.  We did not identify any specific hot nodes.  We also did not note the presence of blue dye in the axilla.  We then elected to proceed with a level 1 axillary node dissection.  This was completed without incident.  We did identify the thoracodorsal nerve in the course of our dissection.  All nodes were sent to Pathology for analysis.       At the completion of our dissection, the deep space of  the axilla was reapproximated with a 2-0 Vicryl suture.  The dermis was reapproximated with interrupted 3-0 Vicryl stitches.  The skin was closed with a running Monocryl suture.  The patient was then awakened from anesthesia, extubated, and transferred to recovery in stable condition.    Physical Exam  see progress note for 1/10/2019    HOSPITAL COURSE  : Uneventful. Patient admitted on 1/9/2019 for left breast lumpectomy and level I axillary lymph node dissection. Patient tolerated procedure well and kept overnight for pain control and monitoring.  Patient evaluated by PT/OT while inpatient for home exercises. Patient instructed on JANNETH drain care and to return to clinic for removal of JANNETH drain and follow up. There were no questions that were not answered while patient was in-hour.  Patient stable for discharge POD#1 with follow up and strict ED precautions.   DISCHARGE MEDICATIONS  Medication List                 acetaminophen-HYDROcodone 10/325: 1 tab(s), Oral, q6hr, PRN: pain, severe,               28 tab(s), 0 Refill(s).           Home Medication             Lisinopril 40 mg QD             Norvasc 5 mg QD               DISCHARGE INSTRUCTIONS:  DIET: Regular  ACTIVITY: As tolerated, must perform OT home exercises  DISPOSITION: Good  FOLLOW UP APPOINTMENTS  Anytime the conditions worsen, return to clinic or go to ED  [facility], on 01/15/2019    CODE STATUS: Full       Raven Couch MD  [facility]

## 2022-04-30 NOTE — OP NOTE
DATE OF SURGERY:    01/09/2019    SURGEON:  Fabiola Anand MD    attending physician:  Angie Hsu MD    ATTENDING SURGEON:  Bradley Herrera MD    RESIDENT SURGEON:  Fabiola Anand MD.    PREOPERATIVE DIAGNOSIS:  Stage III triple-negative invasive ductal carcinoma of the left breast.    POSTOPERATIVE DIAGNOSIS:  Stage III triple-negative invasive ductal carcinoma of the left breast.    PROCEDURES PERFORMED:    1. Left breast lumpectomy.    2. Level 1 axillary lymph node dissection.    ANESTHESIA:  General.    BLOOD LOSS:  10 mL.    FINDINGS:    1. Palpable breast tumor was removed without complication.    2. Preoperatively, methylene blue dye and radioactive tracer were injected to allow for identification of a sentinel lymph node.  We did not identify any hot and blue nodes during our dissection.  We made the decision to proceed with a complete level 1 axillary lymph node dissection.    SPECIMENS:    1. Left breast lumpectomy.    2. Left axillary tissue.    3. Left axillary nodes.  4. Left axillary node.    IMPLANTS:  JANNETH drain.    COMPLICATIONS:  None.    INDICATION FOR PROCEDURE:  The patient is a 67-year-old female with a history of left breast cancer, triple-negative, diagnosed in September of 2017.  She was initially treated with neoadjuvant chemotherapy with followup staging and treatment.  She was found to have a left supraclavicular lymph node that was positive for malignancy.  She also had a biopsy of a palpable node down in the __________ that also showed malignancy.  Her chemotherapy regimen was changed to a cisplatin-based chemotherapy regimen.  She last underwent chemo in September 2018.  She also underwent a PET scan, which showed a hypermetabolic, lobulated mass in the upper outer quadrant of the left breast, as well as small left supraclavicular lymph node with equivocal radiotracer activity.  No distant metastasis was identified.  The decision was made to proceed with operative  intervention.  The patient preferred to undergo lumpectomy rather than mastectomy.  After a discussion of risks and benefits, both our team and the patient elected to proceed with an axillary venu dissection.    PROCEDURE IN DETAIL:  The patient was taken to the operating room and placed supine on the operating table.  General endotracheal anesthesia was induced.  The patient was positioned with the left arm extended to the side.  A time-out was completed, verifying the correct patient, procedure, and perioperative antibiotics.       An approximately 6 cm incision was made in the area of the mass and axilla.  Incision was deepened through skin and then into breast tissue.  The invasive cancer was dissected out and resected.  We then focused our efforts on the axilla.  We were able to cut down on the axilla using our nuclear medicine probe.  We did not identify any specific hot nodes.  We also did not note the presence of blue dye in the axilla.  We then elected to proceed with a level 1 axillary node dissection.  This was completed without incident.  We did identify the thoracodorsal nerve in the course of our dissection.  All nodes were sent to Pathology for analysis.       At the completion of our dissection, the deep space of the axilla was reapproximated with a 2-0 Vicryl suture.  The dermis was reapproximated with interrupted 3-0 Vicryl stitches.  The skin was closed with a running Monocryl suture.  The patient was then awakened from anesthesia, extubated, and transferred to recovery in stable condition.        ______________________________  Fabiola Anand MD AM/ANG  DD:  01/09/2019  Time:  04:44PM  DT:  01/09/2019  Time:  05:18PM  Job #:  [id number]

## 2022-05-01 NOTE — HISTORICAL [facility]ER
This is a historical note converted from [employer]. Formatting and pictures may have been removed.  Please reference [employer] for original formatting and attached multimedia. Chief Complaint  Breast Cancer  History of Present Illness  Problem list  1. High Grade Triple Negative Left Breast Cancer  -Malignant left axillary and supraclavicular lymphadenopathy  -Originally diagnosed: 08/2017.  -Negative for BRCA1/2  -MMR expression preserved  -NTRK gene fusion negative  -PD-L1?TPS >1 (expressed)  Genetic?testing in 2017 in [place]:?Negative for BRCA1/2  ?   Current Treatment:  1. Gemzar 1000mg/m2 on days 1, 8, & 15 every 28 days.  Started 4/22/2020  ?   C5D15 due 10/28/2020  ?   Treatment History:  1. DDAC x4 cycles 10/26/2017-12/27/2017  2. DD Taxol x4 cycles 01/11/2018-02/27/2018  3. Carboplatin AUC 6 x9 cycles 03/19/2018-09/18/2018  4. Left lumpectomy level 1 ALN dissection: 01/19/2019  5. Palliative Abraxane/Tecentriq cycled every 4 weeks, until disease progression or unacceptable toxicity. Started 1/13/2020. Stopped 4/16/2020 after C3D1 due to progression.  ?   History of present illness:  53-year-old female. ?Referred from [facility] for evaluation and management of invasive ductal carcinoma.??  ?  For details, please see?MD last note dated 01/03/2020.? Please also refer to assessment and plan section where information is summarized.  ?   Interval History:  Patient presents today for a scheduled follow-up, alone, prior to C5D15 gemzar.?Today?she states that the feels okay, her only concern today is her left arm which is painful and swollen. She states that this is new over the last couple of weeks and nothing relieves pain. She is reporting difficulty with ROM. She continues to report left fungating mass to left neck. She is scheduled to see wound care on 10/29/2020. Denies HA, fever, night sweats, SOB, CP, edema, neuropathy. Denies N/V/C/D, abdominal pain, change in bowel/bladder  habits, blood in the urine/stool. Appetite good, weight stable, denies unintentional weight loss/gain. Labs today stable, adequate for treatment, reviewed with patient. Reviewed patient medications. Patient denies needing any medication refills at this time. Denies questions/needs/concerns.  Review of Systems  A complete 12 point ROS was performed in full with pertinent positives described in interval history. Remainder of ROS done in full and are negative.?  Physical Exam  Vitals & Measurements  T:?36.8? ?C (Oral)? HR:?96(Peripheral)? RR:?16? BP:?145/96?  BMI:?29.05?  Vital Signs Reviewed  General: AAO, NAD noted  Eye: PERRLA, EOMI  Neuro: Normal mentation, strength 5/5 on all 4 extremities, no sensory deficits  HET: Normocephalic, adequate hearing,?no oral ulcers, mucous membranes pink/moist/intact, no pharyngeal erythema, poor dentition  Neck: left neck base with fungating tumor, foul smelling odor upon removing gauze, measures approximately 4cm x 3 cm at its largest point. Draining mild amount of mucopurulent drainage.  Respiratory: Non-labored breathing, symmetrical chest wall expansion, BBS CTA, no crackles/wheezing/rhonchi noted  Cardiovascular: S1S2 noted, RRR, no M/R/G, pulses equal in all extremities, normal peripheral perfusion  Abdomen: Soft, non-tender, non-distended, BS+, no hepatosplenomegaly  Musculoskeletal: Normal ROM, normal gait, ambulates without assistance  Integumentary: No rashes, no bruises or purpura, warm and dry, normal for ethnicity. Left fungating wound with purulent drainage noted, measures approximately 4qdy4qs at its largest dimensions, with potent odor noted.  Neurologic: No focal deficits, Cranial nerves II-XII are grossly intact.  Cognition and Speech: Speech clear and coherent, functional cognition intact  Psychiatric: Cooperative, appropriate mood and affect for situation, normal judgement, no suicidal or homicidal ideations  ?  ?   The National Cancer Whately Toxicity  Scores:  ?   ECOG Performance Scale: 2 - Ambulatory and capable of all selfcare but unable to carry out any work activities; up and about more than 50% of waking hours.  ?  Assessment/Plan  1.?Cancer of left breast, stage 4?C50.912  ? ?? ?# High-grade triple negative left breast cancer,?axillary lymph nodes positive, diagnosed 08/2017  Neoadjuvant DDAC x4, then dose dense Taxol x4 (10/26/2017-02/27/2018)  ?   ?? >>>  Progression?while on neoadjuvant Taxol:  Developed left supraclavicular malignant lymphadenopathy (biopsy 02/09/2018)  Treated with palliative carboplatin AUC 6 q3 weeks x 9 (03/2018-09/2018)?at outside facility  Left breast mass, 3 cm by palpation, never?shrunk with chemotherapy  No distant metastasis?(PET/CT: 11/2018)  Left breast lumpectomy, SLN biopsy (01/09/2019)  -->ypT2 pN0(sn), triple negative  PD-L1 tumor proportion >?1% (expressed)  She declined adjuvant radiation therapy and adjuvant Capecitabine  Noncompliant with follow-up  ?   ?? >>>  Progression:  Disease relapse/progression (07/2019) (left axillary, left supraclavicular lymphadenopathy)  Pain and tenderness over lower cervical/upper thoracic spine?(NO metastasis?in spine)  -Status post palliative radiation therapy to painful, tender?malignant lymphadenopathy (09/05/2019-09/27/2019), without significant decrease in size of left neck mass/lymphadenopathy  -Restaging CTs and bone scan (12/30/2019):?No disease progression;?interval response to radiation therapy?with left supraclavicular necrotic lymph nodes?smaller  -Baseline tumor markers within normal limits (11/20/2019)  -Started Tecentriq/Abraxane 01/13/2020  -Started?third cycle of chemotherapy 03/20/2020  -->Thereafter,?did not present for chemotherapy secondary to severe peripheral neuropathy?(side effect of Abraxane)  -Restaging CT C/A/P?(04/15/2020, status post chemotherapy x3):?Stable left axillary lymph nodes  ?   ?? 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:  -04/16/2020:?Appearance of inflammatory carcinoma of left breast;?left breast?enlarging, painful, and tender;?extremely tender,?firm soft tissue fullness/lymphadenopathy left supraclavicular area?and left side of the neck  -->?She has failed Tecentriq/Abraxane  -->?She has also experienced severe peripheral neuropathy?as side effect of Abraxane, grade 4  -Restaging CT?soft tissue of the neck (04/24/2020):?Enlargement of left supraclavicular necrotic lymph node;?worsening of generalized?degenerative edema throughout the left supraclavicular, pectoral,?axillary region; enlargement of the left subpectoral?axillary lymph node at the edge of the field-of-view  -Restaging bone scan (04/24/2020):?No bone metastasis  -Gemcitabine started 04/22/2020  Gemcitabine dose decreased by 50%?from?C1?D15?onwards (neutropenia)  -Genetic testing?in 2017 in [place]: ?? variant of uncertain significance (VUS): CDKN2A c.217A>C. This variant is predicted to be benign per in silico analysis (PolyPhen score 0.0).  -04/30/2020:?Core biopsy of?left breast lesion positive for?poorly differentiated carcinoma  -Severe neutropenia (ANC 54 mm?)?05/20/2020;?second cycle of gemcitabine held  -05/20/2020: LVEF?60%?(TTE)  ?? -No showed for chemotherapy on 05/27/2020 and 06/01/2020?  -C2 gemcitabine started 06/08/2020 (was supposed to start 05/20/2020; delayed secondary to neutropenia)  -Gemcitabine dose decreased by 50% from C1 D15 onwards already  -Completed C2?gemcitabine on 06/22/2020 (C2?D15)  -No worsening metastatic disease, improved left axillary lymphadenopathy?(post gemcitabine x2?cycles)?(restaging CTs?C/A/P/soft tissues of the neck:?06/29/2020)  -Third cycle of gemcitabine completed 07/20/2020  - ?? Fourth cycle delayed (for noncompliance)  - ?? Restaging CT C/A/P post ?gemcitabine ?x3 ( 09/10/2020): ?No metastasis  -Restaging CT soft of the neck with contrast ?? ?post gemcitabine x3 (09/10/2020 ):  ?Stable left supraclavicular cutaneous/subcutaneous lesion  ---------  ?   # ?? Sites of disease:  Left breast; left supraclavicular lymph nodes; left axillary lymph nodes; status post left breast lumpectomy; recurrent symptoms in left breast  ?? -PD-L1 tumor proportion score >1 (expressed)  -BRCA1/2 negative (2017, [place])  ?? -MMR expression preserved  -NTRK gene fusion negative  ?   # Status post ?? surgical menopause (total hysterectomy for uterine fibroids:?2005)  ?   # Extremely noncompliant with follow-up?in the past  ?   ?? Plan:  ??-On imaging,?stable/improving?metastatic disease post gemcitabine x2  -Stable disease post chemotherapy x3  -Continue gemcitabine?(dose decreased by 50% from?C1 D15 onwards?secondary to neutropenia)  -Clinical suggestion of inflammatory carcinoma of left breast?(biopsy positive for poorly?differentiated carcinoma)?(recurrence)  ?? -Restage?with contrast-enhanced CTs?C/A/P and softness of the neck?in 2 months (early November)  ?  ??-To the extent possible,?avoid chemotherapy with neuropathy potential (for example, paclitaxel, eribulin, vinorelbine, etc.)  -If disease progresses,?then sacituzumab?will be a reasonable option  -Negative for BRCA1/2; MMR expression preserved; NTRK gene fusion negative; PD-L1?TPS >1 (expressed)  -Continue narcotics for pain  -Check CBC and CMP weekly?during chemotherapy with gemcitabine  ?  ?? Discussion:  ?? Chemotherapy schedule:  Gemcitabine 800 to 1200 mg/m? IV days 1, 8, and 15;  Cycled every 28 days  ?  ?? Dose modifications?for myelotoxicity:  -This regimen should not be initiated unless the white blood cell count is >3500 cells/microL and platelets are??100,000/microL  -During therapy, the dose of gemcitabine should be decreased by 25% if the absolute neutrophil count decreases to <1000 cells/microL but??500 cells/microL, or the platelets decrease to <100,000/microL and??50,000/microL  -The [place] Prescribing Information recommends  holding gemcitabine for an absolute neutrophil count <500 cells/microL or platelets <50,000/microL.  ?  -Proceed with C5D15 Gemzar 10/28/2020  -Restaging CT C/A/P/Neck scheduled: 11/03/2020  -Wound care appointment scheduled: 10/29/2020  -Will order Left NIVA upper extremity now to R/O DVT in left arm.  ?  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 ? # ? 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 3 times daily  ?   -C/W Cymbalta and Gabapentin  ?  3.?History of noncompliance with medical treatment?Z91.19  ? ? Has been extremely noncompliant with follow-up?in the past.  No follow-up?between December 2018 and June 2019.? Even subsequently, continued to miss/reschedule appointments.  Missed appointment on 10/30/2019?also.  Missed appointment?for restaging CTs, with considerable delay.  When seen on 11/20/2019,?she was supposed to follow-up with me in 2 weeks?with restaging scans?but she canceled appointment for restaging scans and did not get them done?until 12/30/2019.  Earlier,?after lumpectomy,?she decided not to pursue adjuvant radiation therapy and adjuvant Capecitabine?for persistent?disease?in triple negative setting?post neoadjuvant chemotherapy.  ?? Patient had not presented for follow up or treatment for 2 months. (Summer 2020)  -Compliance encouraged  ?  RTC 11/11/2020 with MD with labs: CBC, CMP, Mg  ?  Discussed POC with patient, all questions answered. Instructed to call clinic for any issues or with any questions PRN, patient verbalizes understanding.  ?   DAREN Howell, FNP-C   Problem List/Past Medical History  Ongoing  Axillary lymphadenopathy  Cancer of left breast, stage 4  Cancer-related pain  Chemotherapy-induced peripheral neuropathy  History of noncompliance with medical treatment  Malignant  neoplasm of unspecified site of left female breast  Obesity  Supraclavicular lymphadenopathy  Historical  No qualifying data  Procedure/Surgical History  Biopsy or excision of lymph node(s); by needle, superficial (eg, cervical, inguinal, axillary) (04/30/2020)  Biopsy, breast, with placement of breast localization device(s) (eg, clip, metallic pellet), when performed, and imaging of the biopsy specimen, when performed, percutaneous; each additional lesion, including ultrasound guidance (List separately in additi (04/30/2020)  Biopsy, breast, with placement of breast localization device(s) (eg, clip, metallic pellet), when performed, and imaging of the biopsy specimen, when performed, percutaneous; first lesion, including ultrasound guidance (04/30/2020)  Biopsy Sentinal Node (None) (01/09/2019)  Excision of Left Breast, Open Approach (01/09/2019)  Lumpectomy (None) (01/09/2019)  Mastectomy, partial (eg, lumpectomy, tylectomy, quadrantectomy, segmentectomy); with axillary lymphadenectomy (01/09/2019)  Resection of Left Axillary Lymphatic, Open Approach (01/09/2019)  Hysterectomy (01/01/2005)  BTL  port   Medications  alPRAzolam 1 mg oral tablet, 1 mg= 1 tab(s), Oral, BID  amlodipine 5 mg oral tablet, 5 mg= 1 tab(s), Oral, Now  atorvastatin 10 mg oral tablet, 10 mg= 1 tab(s), Oral, Daily  Cymbalta 60 mg oral delayed release capsule, 60 mg= 1 cap(s), Oral, At Bedtime, 1 refills  dexamethasone (for IVPB)  gabapentin 600 mg oral tablet, 600 mg= 1 tab(s), Oral, TID, 2 refills  Heparin Flush 100 U/mL - 5 mL, 500 units= 5 mL, IV Push, Once-chemo  lisinopril 40 mg oral tablet, 40 mg= 1 tab(s), Oral, Daily  megestrol 40 mg/mL oral suspension, 800 mg= 20 mL, Oral, Daily, 1 refills  Norco 10 mg-325 mg oral tablet, 1 tab(s), Oral, q4hr, PRN  ondansetron (for IVPB)  OxyContin 10 mg oral tablet, extended release, 10 mg= 1 tab(s), Oral, q12hr  Phenergan 12.5 mg Tab, 12.5 mg= 1 tab(s), Oral, q4hr, PRN  Zofran (for IVPB) 16 mg +  dexamethasone 10 mg/mL injectable solution 10 mg  Allergies  No Known Allergies  Social History  Abuse/Neglect  No, Yes, 10/21/2020  Alcohol  Current, Beer, 1-2 times per week, 10/21/2020  Employment/School  medical disability, Work/School description: housekeeping. Previous employment/school: 11th grade. Activity level: Moderate physical work., 10/31/2018  Home/Environment  Lives with living with sister/ some days sleeps in her car or in a hotel. Living situation: Homeless/Shelter. Alcohol abuse in household: No. Substance abuse in household: No. Smoker in household: No. Family/Friends available for support: Yes. Concern for family members at home: No. Major illness in household: No. Financial concerns: Yes. TV/Computer concerns: No., 10/31/2018  Nutrition/Health  Type of diet: homeless/ occasional meals given by sister. Regular, Wants to lose weight: No., 12/04/2018  Sexual  Gender Identity Identifies as female., 07/16/2019  Substance Use  Current, Marijuana, Daily, 10/21/2020  Tobacco  4 or less cigarettes(less than 1/4 pack)/day in last 30 days, No, Cigarettes, 10/21/2020  Family History  Cardiac arrhythmia.: Father.  Congestive heart disease.: Mother.  Diabetes mellitus type 2: Mother.  Hyperlipidemia.: Mother.  Hypertension.: Mother and Father.  Primary malignant neoplasm of breast: Mother.  Immunizations  Vaccine Date Status   tetanus/diphtheria/pertussis, acel(Tdap) 10/07/2020 Given   Health Maintenance  Health Maintenance  ???Pending?(in the next year)  ??? ??OverDue  ??? ? ? ?Influenza Vaccine due??10/01/19??and every 1??day(s)  ??? ? ? ?Smoking Cessation due??01/01/20??Variable frequency  ??? ? ? ?Alcohol Misuse Screening due??01/02/20??and every 1??year(s)  ??? ? ? ?ADL Screening due??06/25/20??and every 1??year(s)  ??? ??Due?  ??? ? ? ?Colorectal Screening due??10/27/20??Unknown Frequency  ??? ? ? ?Medicare Annual Wellness Exam due??10/27/20??and every 1??year(s)  ??? ? ? ?Zoster Vaccine  due??10/27/20??Unknown Frequency  ??? ??Due In Future?  ??? ? ? ?Obesity Screening not due until??01/01/21??and every 1??year(s)  ??? ? ? ?Body Mass Index Check not due until??10/14/21??and every 1??year(s)  ??? ? ? ?Depression Screening not due until??10/14/21??and every 1??year(s)  ??? ? ? ?Blood Pressure Screening not due until??10/21/21??and every 1??year(s)  ??? ? ? ?Hypertension Management-Blood Pressure not due until??10/21/21??and every 1??year(s)  ??? ? ? ?Hypertension Management-BMP not due until??10/21/21??and every 1??year(s)  ???Satisfied?(in the past 1 year)  ??? ??Satisfied?  ??? ? ? ?Blood Pressure Screening on??10/21/20.??Satisfied by Polina Sanchez  ??? ? ? ?Body Mass Index Check on??10/14/20.??Satisfied by Tian Dodd RN  ??? ? ? ?Breast Cancer Screening on??04/30/20.??Satisfied by Alyssa Espinal  ??? ? ? ?Depression Screening on??10/14/20.??Satisfied by Tian Dodd RN  ??? ? ? ?Diabetes Screening on??10/21/20.??Satisfied by Suyapa Esqueda  ??? ? ? ?Hypertension Management-Blood Pressure on??10/21/20.??Satisfied by Polina Sanchez  ??? ? ? ?Influenza Vaccine on??10/21/20.??Satisfied by Polina Sanchez  ??? ? ? ?Obesity Screening on??10/14/20.??Satisfied by Tian Dodd RN  ??? ? ? ?Tetanus Vaccine on??10/07/20.??Satisfied by Nick Jenkins LPN  ?  Lab Results  Test Name Test Result Date/Time   Sodium Lvl 135 mmol/L (Low) 10/28/2020 08:38 CDT   Potassium Lvl 3.9 mmol/L 10/28/2020 08:38 CDT   Chloride 99 mmol/L 10/28/2020 08:38 CDT   CO2 29 mmol/L 10/28/2020 08:38 CDT   Calcium Lvl 9.7 mg/dL 10/28/2020 08:38 CDT   Magnesium Lvl 1.88 mg/dL 10/28/2020 08:38 CDT   Glucose Lvl 99 mg/dL 10/28/2020 08:38 CDT   BUN 12.0 mg/dL 10/28/2020 08:38 CDT   Creatinine 0.86 mg/dL 10/28/2020 08:38 CDT   eGFR-AA >60 10/28/2020 08:38 CDT   eGFR-NAY >60 10/28/2020 08:38 CDT   Bili Total 0.4 mg/dL 10/28/2020 08:38 CDT   Bili Direct 0.2 mg/dL 10/28/2020 08:38 CDT   Bili Indirect 0.20 mg/dL 10/28/2020 08:38  CDT   AST 32 unit/L 10/28/2020 08:38 CDT   ALT 46 unit/L 10/28/2020 08:38 CDT   Alk Phos 85 unit/L 10/28/2020 08:38 CDT   Total Protein 8.0 gm/dL 10/28/2020 08:38 CDT   Albumin Lvl 3.9 gm/dL 10/28/2020 08:38 CDT   Globulin 4.1 gm/dL (High) 10/28/2020 08:38 CDT   A/G Ratio 1.0 ratio (Low) 10/28/2020 08:38 CDT   WBC 3.3 x10(3)/mcL (Low) 10/28/2020 08:38 CDT   RBC 4.51 x10(6)/mcL 10/28/2020 08:38 CDT   Hgb 12.2 gm/dL 10/28/2020 08:38 CDT   Hct 39.5 % 10/28/2020 08:38 CDT   Platelet 229 x10(3)/mcL 10/28/2020 08:38 CDT   MCV 87.6 fL 10/28/2020 08:38 CDT   MCH 27.1 pg 10/28/2020 08:38 CDT   MCHC 30.9 gm/dL (Low) 10/28/2020 08:38 CDT   RDW 20.1 % (High) 10/28/2020 08:38 CDT   MPV 8.0 fL 10/28/2020 08:38 CDT   Abs Neut 2.10 x10(3)/mcL 10/28/2020 08:38 CDT   Neutro Auto 63 % 10/28/2020 08:38 CDT   Lymph Auto 23 % 10/28/2020 08:38 CDT   Mono Auto 10 % 10/28/2020 08:38 CDT   Eos Auto 0 % 10/28/2020 08:38 CDT   Abs Eos 0.0 x10(3)/mcL 10/28/2020 08:38 CDT   Basophil Auto 1 % 10/28/2020 08:38 CDT   Abs Neutro 2.10 x10(3)/mcL 10/28/2020 08:38 CDT   Abs Lymph 0.8 x10(3)/mcL 10/28/2020 08:38 CDT   Abs Mono 0.4 x10(3)/mcL 10/28/2020 08:38 CDT   Abs Baso 0.0 x10(3)/mcL 10/28/2020 08:38 CDT   IG% 2 % 10/28/2020 08:38 CDT   IG# 0.080 10/28/2020 08:38 CDT       Left upper?extremity US NIVA?negative for?acute DVT

## 2022-05-01 NOTE — HISTORICAL [facility] CERNER
This is a historical note converted from [employer]. Formatting and pictures may have been removed.  Please reference [employer] for original formatting and attached multimedia. Chief Complaint  C3 D15 gemzar  History of Present Illness  Problem list  1. High Grade Triple Negative Left Breast Cancer  -Malignant left axillary and supraclavicular lymphadenopathy  -Originally diagnosed: 08/2017.  -Negative for BRCA1/2  -MMR expression preserved  -NTRK gene fusion negative  -PD-L1?TPS >1 (expressed)  ?   Current Treatment:  1. Gemzar 1000mg/m2 on days 1, 8, & 15 every 28 days.  Started 4/22/2020  ?   C3D15?due 07/20/2020  C4D1 due 8/3/2020  ?   Treatment History:  1. DDAC x4 cycles 10/26/2017-12/27/2017  2. DD Taxol x4 cycles 01/11/2018-02/27/2018  3. Carboplatin AUC 6 x9 cycles 03/19/2018-09/18/2018  4. Left lumpectomy level 1 ALN dissection: 01/19/2019  5. Palliative Abraxane/Tecentriq cycled every 4 weeks, until disease progression or unacceptable toxicity. Started 1/13/2020. Stopped 4/16/2020 after C3D1 due to progression.  ?   History of present illness:  53-year-old female. ?Referred from [facility] for evaluation and management of invasive ductal carcinoma.??  ?  For details, please see?MD last note dated 01/03/2020.? Please also refer to assessment and plan section where information is summarized.  ?   Past medical history:?Hypertension. ?Total hysterectomy in 2005 in [place] for uterine fibroids which were causing excessive vaginal bleeding, requiring blood transfusion. ?Family history of breast cancer and colon cancer. ?  Social history:?. ?3 children. ?Unemployed. ?Says that she is disabled. ?Has been smoking 5-6 cigarettes daily for last 20 years. ?Occasional beer or wine. ?Denies illicit drug abuse.  Family history:?Mother had breast cancer at age 70. ?Mothers niece had breast cancer and colon cancer at unknown age. ?Another niece of her mother had breast cancer at age  50.  Health maintenance:?Has never had screening colonoscopy done.  Menstrual and OB/GYN history:?Menarche at age 12. ?Had 3 pregnancies; first pregnancy at age 15. ?No abortions or miscarriages. ?No history of hormone replacement therapy. ?Status post total hysterectomy in 2005 for uterine fibroids.  ?   Interval History  7/20/2020: Patient present for follow up on Gemzar; she is scheduled to receive C3D15 today. She denies nausea, vomiting, diarrhea, constipation, mouth sores, abdominal pain. Her only complaints today are right sided pain from a fall. She states she informed Dr. Kumar of this fall down the stairs and the pain is improving but she still has bruises on her right leg. She also complains of left inner upper arm tension; she was holding a cup of water as she was falling. She does not report any heavy lifting with that arm; this tension is likely related to her fall. No bruising or?edema?noted to the left arm. She requests a refill of gabapentin, Norco, and Fentanyl. She states sometimes she experiences chest tightness and sharp pain under the left breast that resolves as suddenly as it occurs. She denies experiencing sweating or shortness of breath with the chest tightness, and she denies chest tightness at this time. Advised her to present to the ER/Urgent care when she experiences this tightness. She verbalizes understanding. CMP stable. WBC 2.6; H&H 10.6 & 35.0; plts 174k; ANC 1510. Will have her follow up in 2 weeks prior to C4D1. She is amenable to this plan.  Review of Systems  A complete 12-point?ROS was performed in full with pertinent positives as described in interval history. Remainder of ROS done in full and are negative.  Physical Exam  Vitals & Measurements  T:?36.6? ?C (Oral)? HR:?72(Peripheral)? BP:?190/92? SpO2:?100%?  HT:?154?cm? WT:?71.5?kg? BMI:?30.15?  General: ?Alert and oriented, No acute distress.??  Appearance: Well-groomed  HEENT: ?Normocephalic, Oral mucosa is  moist.?Pupils are equal, round and reactive to light, Extraocular movements are intact, Normal conjunctiva.?  Neck: ?Supple, Non-tender, No lymphadenopathy, No thyromegaly.??  Respiratory: ?Lungs are clear to auscultation, Respirations are non-labored, Breath sounds are equal, Symmetrical chest wall expansion.??  Cardiovascular: ?Normal rate, Regular rhythm, No edema.??  Breast:??Breast exam not performed on todays visit.??  Gastrointestinal: Rounded,?Soft, Non-tender, Non-distended, Normal bowel sounds.??  Musculoskeletal: ?Normal strength.??  Integumentary: ?Warm, dry, intact.?Small tattoo to base of thumb bilateral hands.  Neurologic: ?Alert, Oriented, No focal deficits, Cranial Nerves II-XII are grossly intact.??  Cognition and Speech: ?Oriented, Speech clear and coherent.??  Psychiatric: ?Cooperative, Appropriate mood & affect. ?  ECOG Performance Scale:?1 - Capable of light work.?  Assessment/Plan  1.?Cancer of left breast, stage 4?C50.912,?  # High-grade triple negative left breast cancer,?axillary lymph nodes positive, diagnosed 08/2017  Neoadjuvant DDAC x4, then dose dense Taxol x4 (10/26/2017-02/27/2018)  ?   >>>  Progression:  Developed left supraclavicular malignant lymphadenopathy (biopsy 02/09/2018)  Treated with palliative carboplatin AUC 6 q3 weeks x 9 (03/2018-09/2018)?at outside facility  Left breast mass, 3 cm by palpation, never?shrunk with chemotherapy  No distant metastasis?(PET/CT: 11/2018)  Left breast lumpectomy, SLN biopsy (01/09/2019)  -->ypT2 pN0(sn), triple negative  PD-L1 tumor proportion >1 (expressed)  She declined adjuvant radiation therapy and adjuvant Capecitabine  Noncompliant with follow-up  ?   >>>  Progression:  Disease relapse/progression (07/2019) (left axillary, left supraclavicular lymphadenopathy)  Pain and tenderness over lower cervical/upper thoracic spine?(NO metastasis?in spine)  -Status post palliative radiation therapy to painful, tender?malignant lymphadenopathy  (09/05/2019-09/27/2019), without significant decrease in size of left neck mass/lymphadenopathy  -Restaging CTs and bone scan (12/30/2019):?No disease progression;?interval response to radiation therapy?with left supraclavicular necrotic lymph nodes?smaller  -Baseline tumor markers within normal limits (11/20/2019)  -Started Tecentriq/Abraxane 01/13/2020  -Started?third cycle of chemotherapy 03/20/2020  -->Thereafter,?did not present for chemotherapy secondary to severe peripheral neuropathy?(side effect of Abraxane)  -Restaging CT C/A/P?(04/15/2020):?Stable left axillary lymph nodes  (CT soft tissue of the neck pending)  ?   >>>  Progression:  -04/16/2020:?Appearance of inflammatory carcinoma of left breast;?left breast?enlarging, painful, and tender;?extremely tender,?firm soft tissue fullness/lymphadenopathy left supraclavicular area?and left side of the neck  -->?She has failed Tecentriq/Abraxane  -->?She has also experienced severe peripheral neuropathy?as side effect of Abraxane, grade 4  -Restaging CT?soft tissue of the neck (04/24/2020):?Enlargement of left supraclavicular necrotic lymph node;?worsening of generalized?degenerative edema throughout the left supraclavicular, pectoral,?axillary region; enlargement of the left subpectoral?axillary lymph node at the edge of the field-of-view  -Restaging bone scan (04/24/2020):?No bone metastasis  -Gemcitabine started 04/22/2020  Gemcitabine dose decreased by 50%?from?C1?D15?onwards (neutropenia)  -Genetic testing?in 2017 in [place]: variant of uncertain significance (VUS): CDKN2A c.217A>C. This variant is predicted to be benign per in silico analysis (PolyPhen score 0.0).  -04/30/2020:?Core biopsy of?1/3?left breast lesion positive for?poorly differentiated carcinoma  -Severe neutropenia (ANC 54 mm?)?05/20/2020;?second cycle of gemcitabine held  -05/20/2020: LVEF?60%?(TTE)  -No showed for chemotherapy on 05/27/2020 and 06/01/2020?  -C2 gemcitabine started  06/08/2020 (was supposed to start 05/20/2020; delayed secondary to neutropenia)  -Gemcitabine dose decreased by 50% from C1 D15 onwards already  -Completed C2?gemcitabine on 06/22/2020 (C2?D15)  -No worsening metastatic disease, improved left axillary lymphadenopathy?(post gemcitabine x2?cycles)?(restaging CTs?C/A/P/soft tissues of the neck:?06/29/2020)  ---------  ?   #Sites of disease:  Left breast; left supraclavicular lymph nodes; left axillary lymph nodes; status post left breast lumpectomy; recurrent symptoms in left breast  ---------  ?   #Molecular markers:  -PD-L1 tumor proportion score >1 (expressed)  -BRCA1/2 negative (2017, [place])  -MMR expression preserved  -NTRK gene fusion negative  -------  ?   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 3 times daily  ---------  ?   # Status post surgical menopause (total hysterectomy for uterine fibroids:?2005)  ---------  ?  ?  Plan:  After starting C3D1?of Tecentriq/Abraxane on 03/20/2020,?has been unable to continue chemotherapy because of severe peripheral neuropathy?(side effect of Abraxane)  Needed to change chemotherapy.  Avoid?chemotherapeutic agents with neuropathy potential?(for example,?paclitaxel, eribulin,?vinorelbine, etc.)  Switched?to?gemcitabine?04/22/2020  ?  She has family history of breast cancer.?  Genetic?testing in 2017 in [place]:?Negative for BRCA1/2  ?  Check for MSI-H/dMMR.  Check for NTRK gene fusion.  ?  Baseline tumor markers were within normal limits. ?Therefore,?will not be helpful for follow-up.  ?  Has been extremely noncompliant with follow-up?in the past.  No follow-up?between December 2018 and June 2019.? Even subsequently, continued to miss/reschedule appointments.  Missed appointment on 10/30/2019?also.  Missed appointment?for  restaging CTs, with considerable delay.  When seen on 11/20/2019,?she was supposed to follow-up with me in 2 weeks?with restaging scans?but she canceled appointment for restaging scans and did not get them done?until 12/30/2019.  Earlier,?after lumpectomy,?she decided not to pursue adjuvant radiation therapy and adjuvant Capecitabine?for persistent?disease?in triple negative setting?post neoadjuvant chemotherapy.  ?  She refused?screening colonoscopy for a long time.? At last, after she noticed some blood in stool,?scheduled it for 9 January.?  ?  Ok to proceed with C3D15 of Gemzar today.  Follow up in 2 weeks with CBC, CMP prior to C4D1.  Restage with contrast-enhanced CT scans of soft tissue of the neck, chest, abdomen, pelvis in 2 months (early September); scheduled for 9/3/2020.  Clinical suggestion of inflammatory carcinoma of left breast?(biopsy positive for poorly?differentiated carcinoma)?(recurrence)  Negative for BRCA1/2; MMR expression preserved; NTRK gene fusion negative; PD-L1?TPS >1 (expressed)  Check CBC and CMP weekly.  ?  ?  Chemotherapy schedule:  Gemcitabine 800 to 1200 mg/m? IV days 1, 8, and 15;  Cycled every 28 days  ?  Dose modifications?for myelotoxicity:  -This regimen should not be initiated unless the white blood cell count is >3500 cells/microL and platelets are??100,000/microL  -During therapy, the dose of gemcitabine should be decreased by 25% if the absolute neutrophil count decreases to <1000 cells/microL but??500 cells/microL, or the platelets decrease to <100,000/microL and??50,000/microL  -The [place] Prescribing Information recommends holding gemcitabine for an absolute neutrophil count <500 cells/microL or platelets <50,000/microL.  Cancer of left breast, stage 4?C50.912  Ordered:  ?  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  -On Cymbalta 60 mg nightly and gabapentin?400 mg p.o. twice daily for severe peripheral neuropathy.  Has severe, grade 4 peripheral  neuropathy as side effect of Abraxane  Continue Cymbalta and gabapentin?for neuropathy  To the extent possible,?avoid chemotherapy with neuropathy potential (for example, paclitaxel, eribulin, vinorelbine, etc.)  ?   Continue Cymbalta 60mg PO QHS and Gabapentin 400mg PO BID.  Gabapentin refilled per patients request.  Chemotherapy-induced peripheral neuropathy?G62.0  Ordered:  ?  3.?Cancer-related pain?G89.3  Current Pain Regimen:  -Fentanyl 25mcg transdermal Q72h  -oxycodone 30mg PO Q4H PRN pain  ?   Continue?current pain regimen.  Norco refilled per patients request.  Refill sent for Fentanyl per patients request with earliest fill date of 7/23/2020.  Ordered:  ?  4.?History of noncompliance with medical treatment?Z91.19  # Extremely noncompliant with follow-up?in the past  Has been extremely noncompliant with follow-up?in the past.  No follow-up?between December 2018 and June 2019.? Even subsequently, continued to miss/reschedule appointments.  Missed appointment on 10/30/2019?also.  Missed appointment?for restaging CTs, with considerable delay.  When seen on 11/20/2019,?she was supposed to follow-up with me in 2 weeks?with restaging scans?but she canceled appointment for restaging scans and did not get them done?until 12/30/2019.  Earlier,?after lumpectomy,?she decided not to pursue adjuvant radiation therapy and adjuvant Capecitabine?for persistent?disease?in triple negative setting?post neoadjuvant chemotherapy.  Ordered:  ?   Problem List/Past Medical History  Ongoing  Axillary lymphadenopathy  Cancer of left breast, stage 4  Cancer-related pain  Chemotherapy-induced peripheral neuropathy  History of noncompliance with medical treatment  Malignant neoplasm of unspecified site of left female breast  Obesity  Supraclavicular lymphadenopathy  Historical  No qualifying data  Procedure/Surgical History  Biopsy or excision of lymph node(s); by needle, superficial (eg, cervical, inguinal, axillary)  (04/30/2020)  Biopsy, breast, with placement of breast localization device(s) (eg, clip, metallic pellet), when performed, and imaging of the biopsy specimen, when performed, percutaneous; each additional lesion, including ultrasound guidance (List separately in additi (04/30/2020)  Biopsy, breast, with placement of breast localization device(s) (eg, clip, metallic pellet), when performed, and imaging of the biopsy specimen, when performed, percutaneous; first lesion, including ultrasound guidance (04/30/2020)  Biopsy Sentinal Node (None) (01/09/2019)  Excision of Left Breast, Open Approach (01/09/2019)  Lumpectomy (None) (01/09/2019)  Mastectomy, partial (eg, lumpectomy, tylectomy, quadrantectomy, segmentectomy); with axillary lymphadenectomy (01/09/2019)  Resection of Left Axillary Lymphatic, Open Approach (01/09/2019)  Hysterectomy (01/01/2005)  BTL  port   Medications  alPRAzolam 1 mg oral tablet, 1 mg= 1 tab(s), Oral, BID  amlodipine 5 mg oral tablet, 5 mg= 1 tab(s), Oral, Now  atorvastatin 10 mg oral tablet, 10 mg= 1 tab(s), Oral, Daily  Cymbalta 60 mg oral delayed release capsule, 60 mg= 1 cap(s), Oral, At Bedtime, 1 refills  fentaNYL 25 mcg/hr transdermal film, extended release, 1 patch(es), TOP, q72hr  gabapentin 600 mg oral tablet, 600 mg= 1 tab(s), Oral, TID, 2 refills  Gemzar (for IVPB)  Heparin Flush 100 U/mL - 5 mL, 500 units= 5 mL, IV Push, Once-chemo  Heparin Flush 100 U/mL - 5 mL, 500 units= 5 mL, IV Push, Once-chemo  lisinopril 40 mg oral tablet, 40 mg= 1 tab(s), Oral, Daily  megestrol 40 mg/mL oral suspension, 800 mg= 20 mL, Oral, Daily, 1 refills  MS Contin 30 mg oral tablet, extended release, 30 mg= 1 tab(s), Oral, q12hr  Norco 10 mg-325 mg oral tablet, 1 tab(s), Oral, q4hr, PRN  Norco 10 mg-325 mg oral tablet, 1 tab(s), Oral, q4hr, PRN  oxyCODONE 20 mg oral tablet, 20 mg= 1 tab(s), Oral, q4hr, PRN,? ?Still taking, not as prescribed  oxyCODONE 30 mg oral tablet, 30 mg= 1 tab(s), Oral, q6hr,  PRN  Phenergan 12.5 mg Tab, 12.5 mg= 1 tab(s), Oral, q4hr, PRN  Zofran (for IVPB) 16 mg + dexamethasone 10 mg/mL injectable solution 10 mg  Allergies  No Known Allergies  Social History  Abuse/Neglect  No, 07/20/2020  Alcohol  Current, 09/22/2019  Current, Beer, Wine, 1-2 times per month, Alcohol use interferes with work or home: No. Drinks more than intended: No. Others hurt by drinking: No. Household alcohol concerns: No., 11/08/2018  Employment/School  medical disability, Work/School description: housekeeping. Previous employment/school: 11th grade. Activity level: Moderate physical work., 10/31/2018  Home/Environment  Lives with living with sister/ some days sleeps in her car or in a hotel. Living situation: Homeless/Shelter. Alcohol abuse in household: No. Substance abuse in household: No. Smoker in household: No. Family/Friends available for support: Yes. Concern for family members at home: No. Major illness in household: No. Financial concerns: Yes. TV/Computer concerns: No., 10/31/2018  Nutrition/Health  Type of diet: homeless/ occasional meals given by sister. Regular, Wants to lose weight: No., 12/04/2018  Sexual  Gender Identity Identifies as female., 07/16/2019  Substance Use  Current, Marijuana, Daily, 07/20/2020  Never, 01/02/2019  Tobacco  4 or less cigarettes(less than 1/4 pack)/day in last 30 days, No, Cigarettes, 4 per day., 07/20/2020  Family History  Cardiac arrhythmia.: Father.  Congestive heart disease.: Mother.  Diabetes mellitus type 2: Mother.  Hyperlipidemia.: Mother.  Hypertension.: Mother and Father.  Primary malignant neoplasm of breast: Mother.  Health Maintenance  Health Maintenance  ???Pending?(in the next year)  ??? ??OverDue  ??? ? ? ?Smoking Cessation due??01/01/20??Variable frequency  ??? ? ? ?Alcohol Misuse Screening due??01/02/20??and every 1??year(s)  ??? ? ? ?ADL Screening due??06/25/20??and every 1??year(s)  ??? ??Due?  ??? ? ? ?Colorectal Screening due??07/20/20??and  every?  ??? ? ? ?Influenza Vaccine due??07/20/20??and every?  ??? ? ? ?Medicare Annual Wellness Exam due??07/20/20??and every 1??year(s)  ??? ? ? ?Tetanus Vaccine due??07/20/20??and every 10??year(s)  ??? ? ? ?Zoster Vaccine due??07/20/20??and every?  ??? ??Due In Future?  ??? ? ? ?Obesity Screening not due until??01/01/21??and every 1??year(s)  ???Satisfied?(in the past 1 year)  ??? ??Satisfied?  ??? ? ? ?Blood Pressure Screening on??07/20/20.??Satisfied by Inez Rizo LPN  ??? ? ? ?Body Mass Index Check on??07/20/20.??Satisfied by Gladis Valdez RN  ??? ? ? ?Breast Cancer Screening on??04/30/20.??Satisfied by Alyssa Espinal  ??? ? ? ?Depression Screening on??07/20/20.??Satisfied by Inez Rizo LPN  ??? ? ? ?Diabetes Screening on??07/20/20.??Satisfied by Suyapa Esqueda  ??? ? ? ?Hypertension Management-Blood Pressure on??07/20/20.??Satisfied by Inez Rizo LPN  ??? ? ? ?Influenza Vaccine on??03/26/20.??Satisfied by Polina Sanchez  ??? ? ? ?Obesity Screening on??07/20/20.??Satisfied by Gladis Valdez RN  ?  Lab Results  Test Name Test Result Date/Time   Sodium Lvl 140 mmol/L 07/20/2020 10:33 CDT   Potassium Lvl 4.2 mmol/L 07/20/2020 10:33 CDT   Chloride 109 mmol/L (High) 07/20/2020 10:33 CDT   CO2 27 mmol/L 07/20/2020 10:33 CDT   Calcium Lvl 9.0 mg/dL 07/20/2020 10:33 CDT   Glucose Lvl 97 mg/dL 07/20/2020 10:33 CDT   BUN 14 mg/dL 07/20/2020 10:33 CDT   Creatinine 0.70 mg/dL 07/20/2020 10:33 CDT   eGFR-AA >105 mL/min 07/20/2020 10:33 CDT   eGFR-NAY 93 mL/min 07/20/2020 10:33 CDT   Bili Total 0.2 mg/dL 07/20/2020 10:33 CDT   Bili Direct <0.1 mg/dL 07/20/2020 10:33 CDT   Bili Indirect calc. not valid 07/20/2020 10:33 CDT   AST 22 unit/L 07/20/2020 10:33 CDT   ALT 44 unit/L 07/20/2020 10:33 CDT   Alk Phos 88 unit/L 07/20/2020 10:33 CDT   Total Protein 7.2 gm/dL 07/20/2020 10:33 CDT   Albumin Lvl 3.4 gm/dL 07/20/2020 10:33 CDT   Globulin 3.80 gm/mL (High) 07/20/2020 10:33 CDT    A/G Ratio 0.9 ratio (Low) 07/20/2020 10:33 CDT   WBC 2.6 x10(3)/mcL (Low) 07/20/2020 10:33 CDT   RBC 3.89 x10(6)/mcL (Low) 07/20/2020 10:33 CDT   Hgb 10.6 gm/dL (Low) 07/20/2020 10:33 CDT   Hct 35.0 % 07/20/2020 10:33 CDT   Platelet 174 x10(3)/mcL 07/20/2020 10:33 CDT   MCV 90.0 fL 07/20/2020 10:33 CDT   MCH 27.2 pg 07/20/2020 10:33 CDT   MCHC 30.3 gm/dL (Low) 07/20/2020 10:33 CDT   RDW 20.2 % (High) 07/20/2020 10:33 CDT   MPV 7.8 fL 07/20/2020 10:33 CDT   Abs Neut 1.51 x10(3)/mcL (Low) 07/20/2020 10:33 CDT   Neutro Auto 58 % 07/20/2020 10:33 CDT   Lymph Auto 26 % 07/20/2020 10:33 CDT   Mono Auto 12 % 07/20/2020 10:33 CDT   Eos Auto 1 % 07/20/2020 10:33 CDT   Abs Eos 0.0 x10(3)/mcL 07/20/2020 10:33 CDT   Basophil Auto 0 % 07/20/2020 10:33 CDT   Abs Neutro 1.51 x10(3)/mcL (Low) 07/20/2020 10:33 CDT   Abs Lymph 0.7 x10(3)/mcL 07/20/2020 10:33 CDT   Abs Mono 0.3 x10(3)/mcL 07/20/2020 10:33 CDT   Abs Baso 0.0 x10(3)/mcL 07/20/2020 10:33 CDT   IG% 2 % 07/20/2020 10:33 CDT   IG# 0.0500 07/20/2020 10:33 CDT

## 2022-05-01 NOTE — HISTORICAL [facility] CERNER
This is a historical note converted from [facility]. Formatting and pictures may have been removed.  Please reference [facility] for original formatting and attached multimedia. History of Present Illness  Problem list  Triple negative left breast cancer?  ?   Past medical history:?Hypertension. ?Total hysterectomy in 2005 in [place] for uterine fibroids which were causing excessive vaginal bleeding, requiring blood transfusion. ?Family history of breast cancer and colon cancer. ?  Social history:?. ?3 children. ?Unemployed. ?Says that she is disabled. ?Has been smoking 5-6 cigarettes daily for last 20 years. ?Occasional beer or wine. ?Denies illicit drug abuse.  Family history:?Mother had breast cancer at age 70. ?Mothers niece had breast cancer and colon cancer at unknown age. ?Another niece of her mother had breast cancer at age 50.  Health maintenance:?Has never had screening colonoscopy done.  Menstrual and OB/GYN history:?Menarche at age 12. ?Had 3 pregnancies; first pregnancy at age 15. ?No abortions or miscarriages. ?No history of hormone replacement therapy. ?Status post total hysterectomy in 2005 for uterine fibroids.  ?   Chief complaint:  Follow-up for triple negative left breast cancer  ?  History of present illness:  53-year-old female. ?Referred from [facility] for evaluation and management of invasive ductal carcinoma.??  ?  For details, please see my last note dated 01/03/2020.? Please also refer to assessment and plan section where information is summarized.  ?  Interval History?  ?   10/31/2018:  Presents for initial oncology consultation after moving to [place]. ?Last followed up with her oncologist at [facility] on 09/18/2018. ?Apparently, when she developed left supraclavicular lymph node metastasis, she was told that she has developed stage IV disease, and was palliatively treated with every 3 week carboplatin. ?Surgical  consultation was apparently never obtained. ?On todays visit, she says that overall, she is doing quite well, and rates her general health is 9 on a scale of 1-10, if 10 could be the best. ?Main complaints are weight fluctuations, night sweats, lump in the neck, persistent lump in the left breast which, according to her, never shunt with neoadjuvant chemotherapy, cough, hemorrhoids, and anxiety. ?Reports pain all over the body, 9 on a scale of 1-10, if 10 could be the worst. ?She is asking for refill of her narcotics (oxycodone) as well as Xanax. ?Denies pain in the breast or nipple discharge. ?Appetite is good. ?No unintentional weight loss. ?No unusual headaches or focal neurological symptoms. ?No vision impairment. ?No chest pain or dyspnea. ?No fevers or chills. ?No abdominal pain, nausea, or vomiting. ?No blood in stool. ?No hematemesis, melena, or hematochezia. ?No dysphagia or odynophagia. ?No anorectal pain. ?No abnormal vaginal bleeding or discharge. ?No urinary problems.  ?   07/06/2020:  -No showed for chemotherapy on 05/27/2020 and 06/01/2020  -C2 gemcitabine started 06/08/2020 (was supposed to start 05/20/2020; delayed secondary to neutropenia)  -Gemcitabine dose decreased by 50% from C1 D15 onwards already  -Completed C2 gemcitabine on 06/22/2020 (C2 D15)  -06/29/2020: Restaging CT C/A/P with contrast (comparison: 04/15/2020): No worsening metastatic disease; left axillary lymphadenopathy has improved  -06/29/2020: Restaging CT soft wrist of the neck with contrast (comparison: 04/24/2020): Slightly decreased size of necrotic left supraclavicular lymph node with similar overlying skin irregularity and adjacent fat stranding; no new enlarged or necrotic adenopathy; no worsening of metastatic disease  Presents for follow-up visit. ?Overall, stable.? No worsening of baseline symptoms.? On narcotics for pain in the left neck?and left armpit.? Pain is stable.? She has?yet to fill prescription for?fentanyl  transdermal patch which was prescribed on 06/04/2020.? She is on Norco 10 mg?2 tablets every 4-6 hours PRN for pain.? No unusual headaches or focal neurological symptoms.? Wants refill?of Megace; Megace is helping boost appetite.? No fevers or chills.? No chest pain, cough, or dyspnea.? No abdominal pain, nausea, vomiting.? No bleeding or bruising.? The exposed,?exophytic, fleshy malignant lymph node?at the base of the left neck?ptosis and sometimes bleeds.  ?  ?  Review of Systems?  ?????12 point review of systems done in full with pertinent positives as described in interval history.?  ?   ??  Physical Examination:???  General: ?Alert and oriented, No acute distress. ?  ?????Eye: ?Pupils are equal, round and reactive to light, Extraocular movements are intact, Normal conjunctiva. ?  ?????HENT: ?Normocephalic, Oral mucosa is moist, No pharyngeal erythema, No sinus tenderness. ?  ?????Neck: ?Supple, Non-tender, No jugular venous distention, No thyromegaly,?10/31/2018:  ?????A couple of small, subcentimeter, firm lymph nodes are palpable in the left supraclavicular area, nontender.  ?????No palpable lymphadenopathy in axillary or infraclavicular areas.. ?  ?????Respiratory: ?Lungs are clear to auscultation, Respirations are non-labored, Breath sounds are equal, Symmetrical chest wall expansion, No chest wall tenderness. ?  ?????Cardiovascular: ?Normal rate, Regular rhythm, No murmur, No gallop. ?  ?????Breast: ?10/31/2018:  ?????Bilateral breast examination was performed with her verbal consent, in the presence of Prenella, our LPN.  ?????In the left supraclavicular area, a couple of small, subcentimeter, nontender lymph nodes are palpable, firm in consistency.  ?????Left breast: About 3 cm nontender mobile mass is palpable at 2 oclock position in the left breast, about 10-12 cm from the nipple. ?No skin changes. ?No skin thickening. ?No ulceration. ?No nipple discharge.  ?????Right breast: Free from any suspicious  lesions or lymphadenopathy.. ?  ?????Gastrointestinal: ?Soft, Non-tender, Non-distended, No organomegaly. ?  ?????Genitourinary: ?No costovertebral angle tenderness, No inguinal tenderness. ?  ?????Lymphatics: ?No lymphadenopathy neck, axilla, groin. ?  ?????Neurologic: ?Alert, Oriented, Normal sensory, Normal motor function, No focal deficits. ?  ?????Cognition and Speech: ?Oriented, Speech clear and coherent, Functional cognition intact. ?  ?????Psychiatric: ?Cooperative, Appropriate mood & affect, Normal judgment, Non-suicidal.  06/25/2019:?Enlarging left supraclavicular lymphadenopathy, exquisitely tender;?exquisite tenderness to lower part?of cervical spine/upper part of thoracic spine;?neurologically, intact, without objective sensory or motor impairment;?no gait impairment; bilateral breast examination performed with her verbal consent,?in the presence of her daughter and Prenella, LPN;?no suspicious?lesion in either breast; no palpable lymphadenopathy in axillary, supraclavicular,?infraclavicular, or cervical areas on either side.  07/16/2019:?Exquisitely tender?left lower cervical lymphadenopathy.  08/29/2019:?Left supraclavicular lymph node continues to enlarge; now,?overlying skin is?tense and shiny;?no ulceration at this time?but?ulceration seems imminent; exquisitely tender.  11/20/2019:?She did not let me examine left supraclavicular and left axillary area, saying that the lymph nodes remain extremely tender  04/16/2020:?Miserable from peripheral neuropathy and left neck pain;?severe tenderness in the left supraclavicular area?with diffuse, poorly defined?firm fullness;?soft tissue exophytic?growth is noted, without ulceration or bleeding;?severe tenderness in the left axilla;?range of motion?at the left shoulder is very limited;?breast examination was performed with?her verbal consent, in the presence of Prenella, LPN;?left breast is enlarged, with diffuse orange peel appearance of skin with some  erythema, without skin ulceration/satellite nodules/nipple retraction/nipple discharge, strongly suspicious for inflammatory?carcinoma of left breast.? Right breast appears normal.  06/04/2020:?Left breast area examined with her verbal consent, in the presence of Prenella, LPN;?pain and tenderness over left shoulder area?is so severe that she?does not?let me examine, as usual.? Cancer is protruding through the skin at the base of the neck on the left side, without any bleeding.? Orange peel appearance of the skin of the left breast is noted, without erythema.  ?   ???  Assessment:  # High-grade triple negative left breast cancer,?axillary lymph nodes positive, diagnosed 08/2017  Neoadjuvant DDAC x4, then dose dense Taxol x4 (10/26/2017-02/27/2018)  ?   >>>  Progression:  Developed left supraclavicular malignant lymphadenopathy (biopsy 02/09/2018)  Treated with palliative carboplatin AUC 6 q3 weeks x 9 (03/2018-09/2018)?at outside facility  Left breast mass, 3 cm by palpation, never?shrunk with chemotherapy  No distant metastasis?(PET/CT: 11/2018)  Left breast lumpectomy, SLN biopsy (01/09/2019)  -->ypT2 pN0(sn), triple negative  PD-L1 tumor proportion >1 (expressed)  She declined adjuvant radiation therapy and adjuvant Capecitabine  Noncompliant with follow-up  ?   >>>  Progression:  Disease relapse/progression (07/2019) (left axillary, left supraclavicular lymphadenopathy)  Pain and tenderness over lower cervical/upper thoracic spine?(NO metastasis?in spine)  -Status post palliative radiation therapy to painful, tender?malignant lymphadenopathy (09/05/2019-09/27/2019), without significant decrease in size of left neck mass/lymphadenopathy  -Restaging CTs and bone scan (12/30/2019):?No disease progression;?interval response to radiation therapy?with left supraclavicular necrotic lymph nodes?smaller  -Baseline tumor markers within normal limits (11/20/2019)  -Started Tecentriq/Abraxane 01/13/2020  -Started?third  cycle of chemotherapy 03/20/2020  -->Thereafter,?did not present for chemotherapy secondary to severe peripheral neuropathy?(side effect of Abraxane)  -Restaging CT C/A/P?(04/15/2020):?Stable left axillary lymph nodes  (CT soft tissue of the neck pending)  ?  >>>  Progression:  -04/16/2020:?Appearance of inflammatory carcinoma of left breast;?left breast?enlarging, painful, and tender;?extremely tender,?firm soft tissue fullness/lymphadenopathy left supraclavicular area?and left side of the neck  -->?She has failed Tecentriq/Abraxane  -->?She has also experienced severe peripheral neuropathy?as side effect of Abraxane, grade 4  -Restaging CT?soft tissue of the neck (04/24/2020):?Enlargement of left supraclavicular necrotic lymph node;?worsening of generalized?degenerative edema throughout the left supraclavicular, pectoral,?axillary region; enlargement of the left subpectoral?axillary lymph node at the edge of the field-of-view  -Restaging bone scan (04/24/2020):?No bone metastasis  -Gemcitabine started 04/22/2020  Gemcitabine dose decreased by 50%?from?C1?D15?onwards (neutropenia)  -Genetic testing?in 2017 in [place]: variant of uncertain significance (VUS): CDKN2A c.217A>C. This variant is predicted to be benign per in silico analysis (PolyPhen score 0.0).  -04/30/2020:?Core biopsy of?1/3?left breast lesion positive for?poorly differentiated carcinoma  -Severe neutropenia (ANC 54 mm?)?05/20/2020;?second cycle of gemcitabine held  -05/20/2020: LVEF?60%?(TTE)  -No showed for chemotherapy on 05/27/2020 and 06/01/2020?  -C2 gemcitabine started 06/08/2020 (was supposed to start 05/20/2020; delayed secondary to neutropenia)  -Gemcitabine dose decreased by 50% from C1 D15 onwards already  -Completed C2?gemcitabine on 06/22/2020 (C2?D15)  -No worsening metastatic disease, improved left axillary lymphadenopathy?(post gemcitabine x2?cycles)?(restaging CTs?C/A/P/soft tissues of the neck:?06/29/2020)  ---------  ?  #Sites  of disease:  Left breast; left supraclavicular lymph nodes; left axillary lymph nodes; status post left breast lumpectomy; recurrent symptoms in left breast  ---------  ?  #Molecular markers:  -PD-L1 tumor proportion score >1 (expressed)  -BRCA1/2 negative (2017, [place])  -MMR expression preserved  -NTRK gene fusion negative  -------  ?  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 3 times daily  ---------  ?  # Status post surgical menopause(total hysterectomy for uterine fibroids:?2005)  ----------  ?  # Extremely noncompliant with follow-up?in the past  ---------  ?  ?  Plan:  -On imaging,?stable/improving?metastatic disease post gemcitabine x2  -Continue gemcitabine?(dose decreased by 50% from?C1 D15 onwards?secondary to neutropenia)  -Third?cycle of gemcitabine to start today (07/06/2020)  -Restage with contrast-enhanced CT scans of soft tissue of the neck, chest, abdomen, pelvis in 2 months (early September)  -Clinical suggestion of inflammatory carcinoma of left breast?(biopsy positive for poorly?differentiated carcinoma)?(recurrence)  -Has severe, grade 4 peripheral neuropathy as side effect of Abraxane  -Continue Cymbalta and gabapentin?for neuropathy  -To the extent possible,?avoid chemotherapy with neuropathy potential (for example, paclitaxel, eribulin, vinorelbine, etc.)  -Negative for BRCA1/2; MMR expression preserved; NTRK gene fusion negative; PD-L1?TPS >1 (expressed)  -Continue narcotics for pain  -Check CBC and CMP weekly  ?  Follow-up visit with NP in 2 weeks, for toxicity check  ?  Above discussed at length with her. ?All questions answered.? Went over labs and scans. ?She understands and agrees with this plan.  ---------  ?  ?  After starting C3D1?of Tecentriq/Abraxane on 03/20/2020,?has been unable to  continue chemotherapy because of severe peripheral neuropathy?(side effect of Abraxane)  Needed to change chemotherapy.  Avoid?chemotherapeutic agents with neuropathy potential?(for example,?paclitaxel, eribulin,?vinorelbine, etc.)  Switched?to?gemcitabine?04/22/2020  ?  Chemotherapy schedule:  Gemcitabine 800 to 1200 mg/m? IV days 1, 8, and 15;  Cycled every 28 days  ?  Dose modifications?for myelotoxicity:  -This regimen should not be initiated unless the white blood cell count is >3500 cells/microL and platelets are??100,000/microL  -During therapy, the dose of gemcitabine should be decreased by 25% if the absolute neutrophil count decreases to <1000 cells/microL but??500 cells/microL, or the platelets decrease to <100,000/microL and??50,000/microL  -The [place] Prescribing Information recommends holding gemcitabine for an absolute neutrophil count <500 cells/microL or platelets <50,000/microL.  ?  On Cymbalta 60 mg nightly and gabapentin?400 mg p.o. twice daily for severe peripheral neuropathy.  ?  She has family history of breast cancer.?  Genetic?testing in 2017 in [place]:?Negative for BRCA1/2  ?  Check for MSI-H/dMMR.  Check for NTRK gene fusion.  ?  Baseline tumor markers were within normal limits. ?Therefore,?will not be helpful for follow-up.  ?  Has been extremely noncompliant with follow-up?in the past.  No follow-up?between December 2018 and June 2019.? Even subsequently, continued to miss/reschedule appointments.  Missed appointment on 10/30/2019?also.  Missed appointment?for restaging CTs, with considerable delay.  When seen on 11/20/2019,?she was supposed to follow-up with me in 2 weeks?with restaging scans?but she canceled appointment for restaging scans and did not get them done?until 12/30/2019.  Earlier,?after lumpectomy,?she decided not to pursue adjuvant radiation therapy and adjuvant Capecitabine?for persistent?disease?in triple negative setting?post neoadjuvant  chemotherapy.  ?  She refused?screening colonoscopy for a long time.? At last, after she noticed some blood in stool,?scheduled it for 9 January.?  Physical Exam  Vitals & Measurements  T:?36.9? ?C (Oral)? HR:?83(Peripheral)? BP:?143/82?  HT:?154?cm? WT:?70.8?kg? BMI:?29.85?   Problem List/Past Medical History  Ongoing  Axillary lymphadenopathy  Cancer of left breast, stage 4  Cancer-related pain  Chemotherapy-induced peripheral neuropathy  Malignant neoplasm of unspecified site of left female breast  Obesity  Supraclavicular lymphadenopathy  Historical  No qualifying data  Procedure/Surgical History  Biopsy or excision of lymph node(s); by needle, superficial (eg, cervical, inguinal, axillary) (04/30/2020)  Biopsy, breast, with placement of breast localization device(s) (eg, clip, metallic pellet), when performed, and imaging of the biopsy specimen, when performed, percutaneous; each additional lesion, including ultrasound guidance (List separately in additi (04/30/2020)  Biopsy, breast, with placement of breast localization device(s) (eg, clip, metallic pellet), when performed, and imaging of the biopsy specimen, when performed, percutaneous; first lesion, including ultrasound guidance (04/30/2020)  Biopsy Sentinal Node (None) (01/09/2019)  Excision of Left Breast, Open Approach (01/09/2019)  Lumpectomy (None) (01/09/2019)  Mastectomy, partial (eg, lumpectomy, tylectomy, quadrantectomy, segmentectomy); with axillary lymphadenectomy (01/09/2019)  Resection of Left Axillary Lymphatic, Open Approach (01/09/2019)  Hysterectomy (01/01/2005)  BTL  port   Medications  alPRAzolam 1 mg oral tablet, 1 mg= 1 tab(s), Oral, BID  amlodipine 5 mg oral tablet, 5 mg= 1 tab(s), Oral, Now  atorvastatin 10 mg oral tablet, 10 mg= 1 tab(s), Oral, Daily  Cymbalta 60 mg oral delayed release capsule, 60 mg= 1 cap(s), Oral, At Bedtime, 1 refills  fentaNYL 25 mcg/hr transdermal film, extended release, 1 patch(es), TOP, q72hr,?  ?Investigating  gabapentin 600 mg oral tablet, 600 mg= 1 tab(s), Oral, TID, 2 refills  Heparin Flush 100 U/mL - 5 mL, 500 units= 5 mL, IV Push, Once-chemo  lisinopril 40 mg oral tablet, 40 mg= 1 tab(s), Oral, Daily  megestrol 40 mg/mL oral suspension, 800 mg= 20 mL, Oral, Daily, 1 refills  MS Contin 30 mg oral tablet, extended release, 30 mg= 1 tab(s), Oral, q12hr  Norco 10 mg-325 mg oral tablet, 1 tab(s), Oral, q4hr, PRN  oxyCODONE 20 mg oral tablet, 20 mg= 1 tab(s), Oral, q4hr, PRN,? ?Still taking, not as prescribed  oxyCODONE 30 mg oral tablet, 30 mg= 1 tab(s), Oral, q6hr, PRN  Phenergan 12.5 mg Tab, 12.5 mg= 1 tab(s), Oral, q4hr, PRN,? ?Not Taking, Completed Rx  Zofran (for IVPB) 16 mg + dexamethasone 10 mg/mL injectable solution 10 mg  Allergies  No Known Allergies  Social History  Abuse/Neglect  No, 06/22/2020  Alcohol  Current, 09/22/2019  Current, Beer, Wine, 1-2 times per month, Alcohol use interferes with work or home: No. Drinks more than intended: No. Others hurt by drinking: No. Household alcohol concerns: No., 11/08/2018  Employment/School  medical disability, Work/School description: housekeeping. Previous employment/school: 11th grade. Activity level: Moderate physical work., 10/31/2018  Home/Environment  Lives with living with sister/ some days sleeps in her car or in a hotel. Living situation: Homeless/Shelter. Alcohol abuse in household: No. Substance abuse in household: No. Smoker in household: No. Family/Friends available for support: Yes. Concern for family members at home: No. Major illness in household: No. Financial concerns: Yes. TV/Computer concerns: No., 10/31/2018  Nutrition/Health  Type of diet: homeless/ occasional meals given by sister. Regular, Wants to lose weight: No., 12/04/2018  Sexual  Gender Identity Identifies as female., 07/16/2019  Substance Use  Current, Marijuana, Daily, 01/27/2020  Never, 01/02/2019  Tobacco  10 or more cigarettes (1/2 pack or more)/day in last 30 days,  No, Cigarettes, 4 per day., 06/22/2020  Family History  Cardiac arrhythmia.: Father.  Congestive heart disease.: Mother.  Diabetes mellitus type 2: Mother.  Hyperlipidemia.: Mother.  Hypertension.: Mother and Father.  Primary malignant neoplasm of breast: Mother.

## 2022-05-01 NOTE — HISTORICAL [facility] CERNER
This is a historical note converted from [employer]. Formatting and pictures may have been removed.  Please reference [employer] for original formatting and attached multimedia. Chief Complaint  metastatic breast cancer  History of Present Illness  Problem list  1. High Grade Triple Negative Left Breast Cancer  -Malignant left axillary and supraclavicular lymphadenopathy  -Originally diagnosed: 08/2017.  -Negative for BRCA1/2  -MMR expression preserved  -NTRK gene fusion negative  -PD-L1?TPS >1 (expressed)  Genetic?testing in 2017 in [place]:?Negative for BRCA1/2  ?   Current Treatment:  1. Gemzar 1000mg/m2 on days 1, 8, & 15 every 28 days.  Started 4/22/2020  ?   C7D1 due 12/9/2020  C7D8 due 12/16/2020  C7D15?due 12/23/2020  ?   Dose modifications & interruptions:  C6D15 held; patient positive for COVID  ?   Treatment History:  1. DDAC x4 cycles 10/26/2017-12/27/2017  2. DD Taxol x4 cycles 01/11/2018-02/27/2018  3. Carboplatin AUC 6 x9 cycles 03/19/2018-09/18/2018  4. Left lumpectomy level 1 ALN dissection: 01/19/2019  5. Palliative Abraxane/Tecentriq cycled every 4 weeks, until disease progression or unacceptable toxicity. Started 1/13/2020. Stopped 4/16/2020 after C3D1 due to progression.  ?   History of present illness:  53-year-old female. ?Referred from [facility] for evaluation and management of invasive ductal carcinoma.??  ?  For details, please see?MD last note dated 01/03/2020.? Please also refer to assessment and plan section where information is summarized.  ?   Interval History  12/9/2020: Patient presents for follow up on Gemzar; she is scheduled to receive C7D1 today. She denies nausea, vomiting, diarrhea, constipation, mouth sores, abdominal pain, and neuropathy. She reports vomiting with Xtampza; states she can never keep it down. She asks to be switched back to oxycodone and requests a refill. Will send to [facility] per her request as it is less expensive here. BP elevated;  patient states her mother passed away from COVID yesterday. Patient has completed her quarantine after testing positive for COVID. She is afebrile and does not exhibit a cough or shortness of breath?during the visit. Electrolytes WNL. BUN/creatinine/eGFR WNL. Bili & LFTs WNL. Alk Phos WNL. WBC 4.8; H&H 11.5 & 39.3; plts 538k; ANC 3140. Will have her follow up in 2 weeks with labs prior to treatment. She is amenable to this plan.  Review of Systems  A complete 12-point?ROS was performed in full with pertinent positives as described in interval history. Remainder of ROS done in full and are negative.  Physical Exam  Vitals & Measurements  T:?36.7? ?C (Oral)? HR:?82(Peripheral)? RR:?20? BP:?169/98?  BMI:?29.39?  General: ?Alert and oriented, No acute distress.??  Appearance: Well-groomed  HEENT: ?Normocephalic, Oral mucosa is moist.?Pupils are equal, round and reactive to light, Extraocular movements are intact, Normal conjunctiva.?  Neck: ?Supple, Non-tender, No lymphadenopathy, No thyromegaly.??  Respiratory: ?Lungs are clear to auscultation, Respirations are non-labored, Breath sounds are equal, Symmetrical chest wall expansion.??  Cardiovascular: ?Normal rate, Regular rhythm, No edema.??  Breast:??Breast exam not performed on todays visit.??  Gastrointestinal: Rounded,?Soft, Non-tender, Non-distended, Normal bowel sounds.??  Musculoskeletal: ?Normal strength.??  Integumentary: ?Warm, dry, intact.??Left supraclavicular/lower neck?fungating cutaneous/subcutaneous?metastatic lesion?covered with bandage.  Neurologic: ?Alert, Oriented, No focal deficits, Cranial Nerves II-XII are grossly intact.??  Cognition and Speech: ?Oriented, Speech clear and coherent.??Wearing face mask.  Psychiatric: ?Cooperative, Appropriate mood & affect. ?  ECOG Performance Scale:?1 - Capable of light work.?  Assessment/Plan  1.?Invasive ductal carcinoma of left breast, stage 4?C50.912,?Malignant neoplasm of unspecified site of left female  breast?C50.912  # High-grade triple negative left breast cancer,?axillary lymph nodes positive, diagnosed 08/2017  Neoadjuvant DDAC x4, then dose dense Taxol x4 (10/26/2017-02/27/2018)  ?   >>>  Progression?while on neoadjuvant Taxol:  Developed left supraclavicular malignant lymphadenopathy (biopsy 02/09/2018)  Treated with palliative carboplatin AUC 6 q3 weeks x 9 (03/2018-09/2018)?at outside facility  Left breast mass, 3 cm by palpation, never?shrunk with chemotherapy  No distant metastasis?(PET/CT: 11/2018)  Left breast lumpectomy, SLN biopsy (01/09/2019)  -->ypT2 pN0(sn), triple negative  PD-L1 tumor proportion >?1% (expressed)  She declined adjuvant radiation therapy and adjuvant Capecitabine  Noncompliant with follow-up  ?   >>>  Progression:  Disease relapse/progression (07/2019) (left axillary, left supraclavicular lymphadenopathy)  Pain and tenderness over lower cervical/upper thoracic spine?(NO metastasis?in spine)  -Status post palliative radiation therapy to painful, tender?malignant lymphadenopathy (09/05/2019-09/27/2019), without significant decrease in size of left neck mass/lymphadenopathy  -Restaging CTs and bone scan (12/30/2019):?No disease progression;?interval response to radiation therapy?with left supraclavicular necrotic lymph nodes?smaller  -Baseline tumor markers within normal limits (11/20/2019)  -Started Tecentriq/Abraxane 01/13/2020  -Started?third cycle of chemotherapy 03/20/2020  -->Thereafter,?did not present for chemotherapy secondary to severe peripheral neuropathy?(side effect of Abraxane)  -Restaging CT C/A/P?(04/15/2020, status post chemotherapy x3):?Stable left axillary lymph nodes  ?   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:  -04/16/2020:?Appearance of inflammatory carcinoma of left breast;?left breast?enlarging, painful, and tender;?extremely tender,?firm soft tissue fullness/lymphadenopathy left supraclavicular area?and  left side of the neck  -->?She has failed Tecentriq/Abraxane  -->?She has also experienced severe peripheral neuropathy?as side effect of Abraxane, grade 4  -Restaging CT?soft tissue of the neck (04/24/2020):?Enlargement of left supraclavicular necrotic lymph node;?worsening of generalized?degenerative edema throughout the left supraclavicular, pectoral,?axillary region; enlargement of the left subpectoral?axillary lymph node at the edge of the field-of-view  -Restaging bone scan (04/24/2020):?No bone metastasis  -Gemcitabine started 04/22/2020  Gemcitabine dose decreased by 50%?from?C1?D15?onwards (neutropenia)  -Genetic testing?in 2017 in [place]: variant of uncertain significance (VUS): CDKN2A c.217A>C. This variant is predicted to be benign per in silico analysis (PolyPhen score 0.0).  -04/30/2020:?Core biopsy of?left breast lesion positive for?poorly differentiated carcinoma  -Severe neutropenia (ANC 54 mm?)?05/20/2020;?second cycle of gemcitabine held  -05/20/2020: LVEF?60%?(TTE)  -No showed for chemotherapy on 05/27/2020 and 06/01/2020?  -C2 gemcitabine started 06/08/2020 (was supposed to start 05/20/2020; delayed secondary to neutropenia)  -Gemcitabine dose decreased by 50% from C1 D15 onwards already  -Completed C2?gemcitabine on 06/22/2020 (C2?D15)  -No worsening metastatic disease, improved left axillary lymphadenopathy?(post gemcitabine x2?cycles)?(restaging CTs?C/A/P/soft tissues of the neck:?06/29/2020)  -Third cycle of gemcitabine completed 07/20/2020  -Fourth cycle delayed (for noncompliance)  -Restaging CT C/A/P post?gemcitabine?x3 (09/10/2020):?No metastasis  -Restaging CT soft of the neck with contrast?post gemcitabine x3 (09/10/2020):?Stable left supraclavicular cutaneous/subcutaneous lesion  -No progression post gemcitabine x5 cycles?(CTs: 11/09/2020)  ?   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   #Sites of disease:  Left breast; left supraclavicular lymph nodes; left axillary lymph nodes; status post left breast lumpectomy; recurrent symptoms in left breast  ---------  ?   #Molecular markers:  -PD-L1 tumor proportion score >1 (expressed)  -BRCA1/2 negative (2017, [place])  -MMR expression preserved  -NTRK gene fusion negative  -------  ?   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 3 times daily  ---------  ?  # Status post surgical menopause (total hysterectomy for uterine fibroids:?2005)  ----------  ?  # Extremely noncompliant with follow-up?in the past  ---------  ?  ?  Plan:  -Stable/improved disease post gemcitabine x2;?stable disease post gemcitabine x3;?no progression post?gemcitabine x5 cycles  -Continue gemcitabine?(dose decreased by 50% from?cycle #1?day #15 onwards?secondary to neutropenia)  -Restage with contrast-enhanced CT C/A/P/neck?in 3 months?(02/2021)  -Clinical suggestion of inflammatory carcinoma of left breast?(biopsy positive for poorly differentiated carcinoma) (recurrence)  -To the extent possible,?avoid chemotherapy with neuropathy potential (for example, paclitaxel, eribulin, vinorelbine, etc.)  -If disease progresses,?then sacituzumab?will be  a reasonable option  -Negative for BRCA1/2; MMR expression preserved; NTRK gene fusion negative; PD-L1?TPS >1 (expressed)  -Continue narcotics for pain  -Check CBC and CMP weekly?during chemotherapy with gemcitabine  -Follow-up with wound care?for fungating?cutaneous/subcutaneous?mass left supraclavicular area (metastasis)  ?  -Consult lymphedema specialist (left upper extremity lymphedema)  ?  -Compliance encouraged  ?  Ok to proceed with C7D1 of Gemzar today.  Oxycontin 10mg ER refilled to [facility] pharmacy per patients request.  CBC and CMP weekly.  Check CBC, CMP, Mg on 12/16/2020 to be reviewed by infusion nurse.  If labs WNL, ok to proceed with C7D8 on 12/16/2020.  Follow up in 2 weeks (F2F on 12/23/2020) with CBC, CMP, Mg prior to C7D15.  Restage with contrast-enhanced CT C/A/P/neck?in 3 months?(02/2021); scheduled for 2/11/2021.  ?  Discussion:  Chemotherapy schedule:  Gemcitabine 800 to 1200 mg/m? IV days 1, 8, and 15;  Cycled every 28 days  ?  Dose modifications?for myelotoxicity:  -This regimen should not be initiated unless the white blood cell count is >3500 cells/microL and platelets are??100,000/microL  -During therapy, the dose of gemcitabine should be decreased by 25% if the absolute neutrophil count decreases to <1000 cells/microL but??500 cells/microL, or the platelets decrease to <100,000/microL and??50,000/microL  -The [place] Prescribing Information recommends holding gemcitabine for an absolute neutrophil count <500 cells/microL or platelets <50,000/microL.  ?  On Cymbalta 60 mg nightly and gabapentin?400 mg p.o. twice daily for severe peripheral neuropathy.  ?  She has family history of breast cancer.?  Genetic?testing in 2017 in [place]:?Negative for BRCA1/2  ?  Baseline tumor markers were within normal limits. ?Therefore,?will not be helpful for follow-up.  ?  Has been extremely noncompliant with follow-up?in the past.  No follow-up?between December 2018 and June 2019.? Even  subsequently, continued to miss/reschedule appointments.  Missed appointment on 10/30/2019?also.  Missed appointment?for restaging CTs, with considerable delay.  When seen on 11/20/2019,?she was supposed to follow-up with me in 2 weeks?with restaging scans?but she canceled appointment for restaging scans and did not get them done?until 12/30/2019.  Earlier,?after lumpectomy,?she decided not to pursue adjuvant radiation therapy and adjuvant Capecitabine?for persistent?disease?in triple negative setting?post neoadjuvant chemotherapy.  ?  She refused?screening colonoscopy for a long time.? At last, after she noticed some blood in stool,?scheduled it for 9 January.?  Ordered:  ?  2.?Chemotherapy-induced peripheral neuropathy?G62.0  -Has severe,?grade 4 peripheral neuropathy, side effect of Abraxane  -Continue Cymbalta and gabapentin.  -Symptoms improved as of 11/11/2020  -Norco refilled  Ordered:  ?   Problem List/Past Medical History  Ongoing  Axillary lymphadenopathy  Cancer of left breast, stage 4  Cancer-related pain  Chemotherapy-induced peripheral neuropathy  History of noncompliance with medical treatment  Malignant neoplasm of unspecified site of left female breast  Obesity  Supraclavicular lymphadenopathy  Historical  No qualifying data  Procedure/Surgical History  Biopsy or excision of lymph node(s); by needle, superficial (eg, cervical, inguinal, axillary) (04/30/2020)  Biopsy, breast, with placement of breast localization device(s) (eg, clip, metallic pellet), when performed, and imaging of the biopsy specimen, when performed, percutaneous; each additional lesion, including ultrasound guidance (List separately in additi (04/30/2020)  Biopsy, breast, with placement of breast localization device(s) (eg, clip, metallic pellet), when performed, and imaging of the biopsy specimen, when performed, percutaneous; first lesion, including ultrasound guidance (04/30/2020)  Biopsy Sentinal Node (None)  (01/09/2019)  Excision of Left Breast, Open Approach (01/09/2019)  Lumpectomy (None) (01/09/2019)  Mastectomy, partial (eg, lumpectomy, tylectomy, quadrantectomy, segmentectomy); with axillary lymphadenectomy (01/09/2019)  Resection of Left Axillary Lymphatic, Open Approach (01/09/2019)  Hysterectomy (01/01/2005)  BTL  port   Medications  alPRAzolam 1 mg oral tablet, 1 mg= 1 tab(s), Oral, BID  amlodipine 5 mg oral tablet, 5 mg= 1 tab(s), Oral, Now  atorvastatin 10 mg oral tablet, 10 mg= 1 tab(s), Oral, Daily  Cymbalta 60 mg oral delayed release capsule, 60 mg= 1 cap(s), Oral, At Bedtime, 1 refills  dexamethasone (for IVPB)  dexamethasone (for IVPB)  dexamethasone (for IVPB)  gabapentin 600 mg oral tablet, 600 mg= 1 tab(s), Oral, TID, 2 refills  Heparin Flush 100 U/mL - 5 mL, 500 units= 5 mL, IV Push, Once-chemo  Heparin Flush 100 U/mL - 5 mL, 500 units= 5 mL, IV Push, Once-chemo  Heparin Flush 100 U/mL - 5 mL, 500 units= 5 mL, IV Push, Once-chemo  lisinopril 40 mg oral tablet, 40 mg= 1 tab(s), Oral, Daily  megestrol 40 mg/mL oral suspension, 800 mg= 20 mL, Oral, Daily, 1 refills  metroNIDAZOLE 0.75% topical cream, 1 dennis, TOP, BID, 2 refills  Norco 10 mg-325 mg oral tablet, 1 tab(s), Oral, q4hr, PRN  ondansetron (for IVPB)  ondansetron (for IVPB)  ondansetron (for IVPB)  OxyContin 10 mg oral tablet, extended release, 10 mg= 1 tab(s), Oral, q12hr  Phenergan 12.5 mg Tab, 12.5 mg= 1 tab(s), Oral, q4hr, PRN  Zofran (for IVPB) 16 mg + dexamethasone 10 mg/mL injectable solution 10 mg  Allergies  No Known Allergies  Social History  Abuse/Neglect  No, No, Yes, 12/09/2020  Alcohol  Current, Beer, 1-2 times per week, 11/11/2020  Employment/School  medical disability, Work/School description: housekeeping. Previous employment/school: 11th grade. Activity level: Moderate physical work., 10/31/2018  Home/Environment  Lives with living with sister/ some days sleeps in her car or in a hotel. Living situation: Homeless/Shelter.  Alcohol abuse in household: No. Substance abuse in household: No. Smoker in household: No. Family/Friends available for support: Yes. Concern for family members at home: No. Major illness in household: No. Financial concerns: Yes. TV/Computer concerns: No., 10/31/2018  Nutrition/Health  Type of diet: homeless/ occasional meals given by sister. Regular, Wants to lose weight: No., 12/04/2018  Sexual  Gender Identity Identifies as female., 07/16/2019  Substance Use  Current, Marijuana, 12/09/2020  Tobacco  4 or less cigarettes(less than 1/4 pack)/day in last 30 days, No, Cigarettes, 4 per day., 12/09/2020  Family History  Cardiac arrhythmia.: Father.  Congestive heart disease.: Mother.  Diabetes mellitus type 2: Mother.  Hyperlipidemia.: Mother.  Hypertension.: Mother and Father.  Primary malignant neoplasm of breast: Mother.  Immunizations  Vaccine Date Status   tetanus/diphtheria/pertussis, acel(Tdap) 10/07/2020 Given   Health Maintenance  Health Maintenance  ???Pending?(in the next year)  ??? ??OverDue  ??? ? ? ?Smoking Cessation due??01/01/20??Variable frequency  ??? ? ? ?Alcohol Misuse Screening due??01/02/20??and every 1??year(s)  ??? ? ? ?ADL Screening due??06/25/20??and every 1??year(s)  ??? ? ? ?Influenza Vaccine due??10/01/20??and every 1??day(s)  ??? ??Due?  ??? ? ? ?Colorectal Screening due??12/09/20??Unknown Frequency  ??? ? ? ?Medicare Annual Wellness Exam due??12/09/20??and every 1??year(s)  ??? ? ? ?Zoster Vaccine due??12/09/20??Unknown Frequency  ??? ??Due In Future?  ??? ? ? ?Obesity Screening not due until??01/01/21??and every 1??year(s)  ???Satisfied?(in the past 1 year)  ??? ??Satisfied?  ??? ? ? ?Blood Pressure Screening on??12/09/20.??Satisfied by Gabi Valle LPN  ??? ? ? ?Body Mass Index Check on??12/09/20.??Satisfied by Gabi Valle LPN  ??? ? ? ?Breast Cancer Screening on??04/30/20.??Satisfied by Alyssa Espinal  ??? ? ? ?Depression Screening on??12/09/20.??Satisfied by  [facility] LPN, Beonka S  ??? ? ? ?Diabetes Screening on??12/09/20.??Satisfied by Sue Bolton  ??? ? ? ?Hypertension Management-Blood Pressure on??12/09/20.??Satisfied by [facility] LPN, Beonka S  ??? ? ? ?Influenza Vaccine on??12/09/20.??Satisfied by [facility] LPN, Beonka S  ??? ? ? ?Obesity Screening on??12/09/20.??Satisfied by [facility] LPN, Beonka S  ??? ? ? ?Tetanus Vaccine on??10/07/20.??Satisfied by Gregory VASQUEZN, Nick  ?  Lab Results  Test Name Test Result Date/Time   Sodium Lvl 141 mmol/L 12/09/2020 07:40 CST   Potassium Lvl 3.9 mmol/L 12/09/2020 07:40 CST   Chloride 105 mmol/L 12/09/2020 07:40 CST   CO2 25 mmol/L 12/09/2020 07:40 CST   Glucose Lvl 127 mg/dL (High) 12/09/2020 07:40 CST   BUN 11.0 mg/dL 12/09/2020 07:40 CST   Creatinine 0.74 mg/dL 12/09/2020 07:40 CST   eGFR-AA >105 12/09/2020 07:40 CST   eGFR-NAY 87 mL/min/1.73 m2 (Low) 12/09/2020 07:40 CST   Bili Total 0.3 mg/dL 12/09/2020 07:40 CST   Bili Direct 0.1 mg/dL 12/09/2020 07:40 CST   Bili Indirect 0.20 mg/dL 12/09/2020 07:40 CST   AST 25 unit/L 12/09/2020 07:40 CST   ALT 20 unit/L 12/09/2020 07:40 CST   Alk Phos 85 unit/L 12/09/2020 07:40 CST   Total Protein 6.9 gm/dL 12/09/2020 07:40 CST   Albumin Lvl 3.6 gm/dL 12/09/2020 07:40 CST   Globulin 3.3 gm/dL 12/09/2020 07:40 CST   A/G Ratio 1.1 ratio 12/09/2020 07:40 CST   WBC 4.8 x10(3)/mcL 12/09/2020 07:40 CST   RBC 4.36 x10(6)/mcL 12/09/2020 07:40 CST   Hgb 11.5 gm/dL (Low) 12/09/2020 07:40 CST   Hct 39.3 % 12/09/2020 07:40 CST   Platelet 538 x10(3)/mcL (High) 12/09/2020 07:40 CST   MCV 90.1 fL 12/09/2020 07:40 CST   MCH 26.4 pg 12/09/2020 07:40 CST   MCHC 29.3 gm/dL (Low) 12/09/2020 07:40 CST   RDW 20.5 % (High) 12/09/2020 07:40 CST   MPV 8.6 fL 12/09/2020 07:40 CST   Abs Neut 3.14 x10(3)/mcL 12/09/2020 07:40 CST   Neutro Auto 65 % 12/09/2020 07:40 CST   Lymph Auto 18 % 12/09/2020 07:40 CST   Mono Auto 14 % 12/09/2020 07:40 CST   Eos Auto 2 % 12/09/2020 07:40 CST   Abs Eos 0.1  x10(3)/mcL 12/09/2020 07:40 CST   Basophil Auto 1 % 12/09/2020 07:40 CST   Abs Neutro 3.14 x10(3)/mcL 12/09/2020 07:40 CST   Abs Lymph 0.9 x10(3)/mcL 12/09/2020 07:40 CST   Abs Mono 0.7 x10(3)/mcL 12/09/2020 07:40 CST   Abs Baso 0.0 x10(3)/mcL 12/09/2020 07:40 CST   IG% 0 % 12/09/2020 07:40 CST   IG# 0.020 12/09/2020 07:40 CST

## 2022-05-01 NOTE — HISTORICAL [facility] CERNER
This is a historical note converted from [employer]. Formatting and pictures may have been removed.  Please reference [employer] for original formatting and attached multimedia. Chief Complaint  Metastatic breast cancer  History of Present Illness  Problem list  1. High Grade Triple Negative Left Breast Cancer  -Malignant left axillary and supraclavicular lymphadenopathy  -Originally diagnosed: 08/2017.  -Negative for BRCA1/2  -MMR expression preserved  -NTRK gene fusion negative  -PD-L1?TPS >1 (expressed)  ?  Current Treatment Plan:  1. Gemzar 1000mg/m2 on days 1, 8, & 15 every 28 days.  Started 4/22/2020  ?   C2D8?due 06/15/2020  ?   Treatment History:  1. DDAC x4 cycles 10/26/2017-12/27/2017  2. DD Taxol x4 cycles 01/11/2018-02/27/2018  3. Carboplatin AUC 6 x9 cycles 03/19/2018-09/18/2018  4. Left lumpectomy level 1 ALN dissection: 01/19/2019  5. Palliative Abraxane/Tecentriq cycled every 4 weeks, until disease progression or unacceptable toxicity. Started 1/13/2020. Stopped 4/16/2020 after C3D1 due to progression.  ?   History of present illness:  53-year-old female. ?Referred from [facility] for evaluation and management of invasive ductal carcinoma.??  ?  For details, please see?MD last note dated 01/03/2020.? Please also refer to assessment and plan section where information is summarized.  ?   Past medical history:?Hypertension. ?Total hysterectomy in 2005 in [place] for uterine fibroids which were causing excessive vaginal bleeding, requiring blood transfusion. ?Family history of breast cancer and colon cancer. ?  Social history:?. ?3 children. ?Unemployed. ?Says that she is disabled. ?Has been smoking 5-6 cigarettes daily for last 20 years. ?Occasional beer or wine. ?Denies illicit drug abuse.  Family history:?Mother had breast cancer at age 70. ?Mothers niece had breast cancer and colon cancer at unknown age. ?Another niece of her mother had breast cancer at age  50.  Health maintenance:?Has never had screening colonoscopy done.  Menstrual and OB/GYN history:?Menarche at age 12. ?Had 3 pregnancies; first pregnancy at age 15. ?No abortions or miscarriages. ?No history of hormone replacement therapy. ?Status post total hysterectomy in 2005 for uterine fibroids.  ?   Interval History:  ?   Patient presents today for a scheduled follow-up, alone.?Currently receiving gemzar as specified above, tolerating well with no intolerable effects noted or reported, no new complaints. She endorses no new problems or concerns today. The patient is tearful today d/t the fact that she left the place she was staying and now has no where to live. She states that she is going to go visit with her sister after chemotherapy today to try and figure something out. Denies HA, fever, night sweats, SOB, CP, edema, neuropathy. Denies N/V/C/D, abdominal pain, change in bowel/bladder habits, blood in the urine/stool. Appetite good, weight stable, denies unintentional weight loss/gain. Labs today stable, adequate for treatment, reviewed with patient. Reviewed patient medications. Patient denies needing any medication refills at this time. Denies questions/needs/concerns.  ?  Review of Systems  A complete 12 point ROS was performed in full with pertinent positives described in interval history. Remainder of ROS done in full and are negative.?  Physical Exam  Vitals & Measurements  T:?36.2? ?C (Oral)? HR:?81(Peripheral)? RR:?18? BP:?145/91?  HT:?160?cm? WT:?68.3?kg? BMI:?26.68?  Vital Signs Reviewed  General: AAO, NAD noted  Eye: PERRLA, EOMI  Neuro: Normal mentation, strength 5/5 on all 4 extremities, no sensory deficits  HET: Normocephalic, adequate hearing,?no oral ulcers, mucous membranes pink/moist/intact, no pharyngeal erythema, poor dentition  Neck: Supple, non-tender, no lymphadenopathy?  Respiratory: Non-labored breathing, symmetrical chest wall expansion, BBS CTA, no crackles/wheezing/rhonchi  noted  Cardiovascular: S1S2 noted, RRR, no M/R/G, pulses equal in all extremities, normal peripheral perfusion  Abdomen: Soft, non-tender, non-distended, BS+, no hepatosplenomegaly  Musculoskeletal: Normal ROM, normal gait, ambulates without assistance  Integumentary: No rashes, no bruises or purpura, warm and dry, normal for ethnicity  Neurologic: No focal deficits, Cranial nerves II-XII are grossly intact.  Cognition and Speech: Speech clear and coherent, functional cognition intact  Psychiatric: Cooperative, appropriate mood and affect for situation, normal judgement, no suicidal or homicidal ideations  ?  ?   The National Cancer [place] Toxicity Scores:  ?   ECOG Performance Scale: 1 - Strenuous physical activity restricted; fully ambulatory and able to carry out light work.  ?  Assessment/Plan  1.?Cancer of left breast, stage 4?C50.912  ? To summarize:  # High-grade triple negative left breast cancer,?axillary lymph nodes positive, diagnosed 08/2017  Neoadjuvant DDAC x4, then dose dense Taxol x4 (10/26/2017-02/27/2018)  ?   >>>  Developed left supraclavicular malignant lymphadenopathy (biopsy 02/09/2018)  Treated with palliative carboplatin AUC 6 q3 weeks x 9 (03/2018-09/2018)?at outside facility  Left breast mass, 3 cm by palpation, never?shrunk with chemotherapy  No distant metastasis?(PET/CT: 11/2018)  Left breast lumpectomy, SLN biopsy (01/09/2019)  -->ypT2 pN0(sn), triple negative  PD-L1 tumor proportion >1 (expressed)  She declined adjuvant radiation therapy and adjuvant Capecitabine  Noncompliant with follow-up  ?   >>>  Progression:  Disease relapse/progression (07/2019) (left axillary, left supraclavicular lymphadenopathy)  Pain and tenderness over lower cervical/upper thoracic spine?(NO metastasis?in spine)  -Status post palliative radiation therapy to painful, tender?malignant lymphadenopathy (09/05/2019-09/27/2019), without significant decr  ease in size of left neck  mass/lymphadenopathy  -Restaging CTs and bone scan (12/30/2019):?No disease progression;?interval response to radiation therapy?with left supraclavicular necrotic lymph nodes?smaller  -Baseline tumor markers within normal limits (11/20/2019)  -Started Tecentriq/Abraxane 01/13/2020  -Started?third cycle of chemotherapy 03/20/2020  -->Thereafter,?did not present for chemotherapy secondary to severe peripheral neuropathy?(side effect of Abraxane)  -Restaging CT C/A/P?(04/15/2020):?Stable left axillary lymph nodes  (CT soft tissue of the neck pending)  ?   >>>  Progression:  -04/16/2020:?Appearance of inflammatory carcinoma of left breast;?left breast?enlarging, painful, and tender;?extremely tender,?firm soft tissue fullness/lymphadenopathy left supraclavicular area?and left side of the neck  -->?She has failed Tecentriq/Abraxane  -->?She has also experienced severe peripheral neuropathy?as side effect of Abraxane, grade 4  -Restaging CT?soft tissue of the neck (04/24/2020):?Enlargement of left supraclavicular necrotic lymph node;?worsening of generalized?degenerative edema throughout the left supraclavicular, pectoral,?axillary region; enlargement of the left subpectoral?axillary lymph node at the edge of the field-of-view  -Restaging bone scan (04/24/2020):?No bone metastasis  -Gemcitabine started 04/22/2020  -Genetic testing?in 2017 in [place]: variant of uncertain significance (VUS): CDKN2A c.217A>C. This variant is predicted to be benign per in silico analysis (PolyPhen score 0.0).  -04/30/2020:?Core biopsy of?1/3?left breast lesion positive for?poorly differentiated carcinoma  -Severe neutropenia (ANC 54 mm?)?05/20/2020;?second cycle of gemcitabine held  -05/20/2020: LVEF?60%?(TTE):  ---------  ?   Sites of disease:  Left breast; left supraclavicular lymph nodes; left axillary lymph nodes; status post left breast lumpectomy  ---------  ?   #Molecular markers:  -PD-L1 tumor proportion score >1  (expressed)  -BRCA1/2 negative (2017, [place])  -MMR expression preserved  -NTRK gene fusion negative  -------  ?   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 3 times daily  ---------  ?   # Status post surgical menopause (total hysterectomy for uterine fibroids:?2005)  ----------  ?  # Extremely noncompliant with follow-up?in the past  ---------  ?  ?  Plan:  -Continue gemcitabine  - Gemcitabine dose was already reduced by 50% from C1D15 onwards because of neutropenia  -Restage with contrast-enhanced CT scans of soft tissue of the neck, chest, abdomen, pelvis in 2 months (late June)  -Clinical suggestion of inflammatory carcinoma of left breast?(biopsy positive for poorly?differentiated carcinoma)  ?  After starting C3D1?of Tecentriq/Abraxane on 03/20/2020,?has been unable to continue chemotherapy because of severe peripheral neuropathy?(side effect of Abraxane)  Needed to change chemotherapy.  Avoid?chemotherapeutic agents with neuropathy potential?(for example,?paclitaxel, eribulin,?vinorelbine, etc.)  Switched?to?gemcitabine?04/22/2020  Chemotherapy schedule:  Gemcitabine 800 to 1200 mg/m? IV days 1, 8, and 15;  Cycled every 28 days  ?  Dose modifications?for myelotoxicity:  -This regimen should not be initiated unless the white blood cell count is >3500 cells/microL and platelets are??100,000/microL  -During therapy, the dose of gemcitabine should be decreased by 25% if the absolute neutrophil count decreases to <1000 cells/microL but??500 cells/microL, or the platelets decrease to <100,000/microL and??50,000/microL  -The [place] Prescribing Information recommends holding gemcitabine for an absolute neutrophil count <500 cells/microL or platelets <50,000/microL.  ?  ?  ?  She has family history of breast  cancer.?  Genetic?testing in 2017 in [place]:?Negative for BRCA1/2  ?  Check for MSI-H/dMMR.  Check for NTRK gene fusion.  ?  Baseline tumor markers were within normal limits. ?Therefore,?will not be helpful for follow-up.  ?  Has been extremely noncompliant with follow-up?in the past.  No follow-up?between December 2018 and June 2019.? Even subsequently, continued to miss/reschedule appointments.  Missed appointment on 10/30/2019?also.  Missed appointment?for restaging CTs, with considerable delay.  When seen on 11/20/2019,?she was supposed to follow-up with me in 2 weeks?with restaging scans?but she canceled appointment for restaging scans and did not get them done?until 12/30/2019.  Earlier,?after lumpectomy,?she decided not to pursue adjuvant radiation therapy and adjuvant Capecitabine?for persistent?disease?in triple negative setting?post neoadjuvant chemotherapy.  -She has no showed on multiple occasions in infusion for treatment  ?  -Proceed with C1D8 gemzar on 06/15/2020  -Proceed with C1D15 gemzar on 06/22/2020 after labs obtained and reviewed with infusion RN: CBC, CMP, Mg level  -Restaging CT C/A/P scheduled: 06/29/2020  ?  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 twice daily for severe peripheral neuropathy.  ?  -C/W Cymbalta 60mg PO QHS and Gabapentin 400mg PO BID  ?  3.?Cancer-related pain?G89.3  ?Current Pain Regimen:  -Fentanyl 25mcg transdermal Q72h  -oxycodone 30mg PO Q4H PRN pain  ?  -C/W current pain regimen  ?  RTC 3 weeks with MD with labs: CBC, CMP, Mg level to review restaging imaging  ?  Discussed POC with patient, all questions answered. Instructed to call clinic for any issues or with any questions PRN, patient verbalizes understanding.  ?   Marla Wilcox, APRN, FNP-C   Problem List/Past Medical History  Ongoing  Axillary lymphadenopathy  Cancer of left breast, stage 4  Cancer-related pain  Chemotherapy-induced peripheral  neuropathy  Malignant neoplasm of unspecified site of left female breast  Obesity  Supraclavicular lymphadenopathy  Historical  No qualifying data  Procedure/Surgical History  Biopsy or excision of lymph node(s); by needle, superficial (eg, cervical, inguinal, axillary) (04/30/2020)  Biopsy, breast, with placement of breast localization device(s) (eg, clip, metallic pellet), when performed, and imaging of the biopsy specimen, when performed, percutaneous; each additional lesion, including ultrasound guidance (List separately in additi (04/30/2020)  Biopsy, breast, with placement of breast localization device(s) (eg, clip, metallic pellet), when performed, and imaging of the biopsy specimen, when performed, percutaneous; first lesion, including ultrasound guidance (04/30/2020)  Biopsy Sentinal Node (None) (01/09/2019)  Excision of Left Breast, Open Approach (01/09/2019)  Lumpectomy (None) (01/09/2019)  Mastectomy, partial (eg, lumpectomy, tylectomy, quadrantectomy, segmentectomy); with axillary lymphadenectomy (01/09/2019)  Resection of Left Axillary Lymphatic, Open Approach (01/09/2019)  Hysterectomy (01/01/2005)  BTL  port   Medications  alPRAzolam 1 mg oral tablet, 1 mg= 1 tab(s), Oral, BID  amlodipine 5 mg oral tablet, 5 mg= 1 tab(s), Oral, Now  atorvastatin 10 mg oral tablet, 10 mg= 1 tab(s), Oral, Daily  Cymbalta 60 mg oral delayed release capsule, 60 mg= 1 cap(s), Oral, At Bedtime, 1 refills  fentaNYL 25 mcg/hr transdermal film, extended release, 1 patch(es), TOP, q72hr  gabapentin 600 mg oral tablet, 600 mg= 1 tab(s), Oral, TID, 2 refills  Heparin Flush 100 U/mL - 5 mL, 500 units= 5 mL, IV Push, Once-chemo  lisinopril 40 mg oral tablet, 40 mg= 1 tab(s), Oral, Daily  megestrol 40 mg/mL oral suspension, 800 mg= 20 mL, Oral, Daily, 1 refills  MS Contin 30 mg oral tablet, extended release, 30 mg= 1 tab(s), Oral, q12hr  oxyCODONE 20 mg oral tablet, 20 mg= 1 tab(s), Oral, q4hr, PRN  oxyCODONE 30 mg oral tablet, 30  mg= 1 tab(s), Oral, q6hr, PRN  Phenergan 12.5 mg Tab, 12.5 mg= 1 tab(s), Oral, q4hr, PRN,? ?Not Taking, Completed Rx  Zofran (for IVPB) 16 mg + dexamethasone 10 mg/mL injectable solution 10 mg  Allergies  No Known Allergies  Social History  Abuse/Neglect  No, 06/08/2020  No, 06/04/2020  Alcohol  Current, 09/22/2019  Current, Beer, Wine, 1-2 times per month, Alcohol use interferes with work or home: No. Drinks more than intended: No. Others hurt by drinking: No. Household alcohol concerns: No., 11/08/2018  Employment/School  medical disability, Work/School description: housekeeping. Previous employment/school: 11th grade. Activity level: Moderate physical work., 10/31/2018  Home/Environment  Lives with living with sister/ some days sleeps in her car or in a hotel. Living situation: Homeless/Shelter. Alcohol abuse in household: No. Substance abuse in household: No. Smoker in household: No. Family/Friends available for support: Yes. Concern for family members at home: No. Major illness in household: No. Financial concerns: Yes. TV/Computer concerns: No., 10/31/2018  Nutrition/Health  Type of diet: homeless/ occasional meals given by sister. Regular, Wants to lose weight: No., 12/04/2018  Sexual  Gender Identity Identifies as female., 07/16/2019  Substance Use  Current, Marijuana, Daily, 01/27/2020  Never, 01/02/2019  Tobacco  4 or less cigarettes(less than 1/4 pack)/day in last 30 days, No, 06/08/2020  4 or less cigarettes(less than 1/4 pack)/day in last 30 days, No, 06/04/2020  Family History  Cardiac arrhythmia.: Father.  Congestive heart disease.: Mother.  Diabetes mellitus type 2: Mother.  Hyperlipidemia.: Mother.  Hypertension.: Mother and Father.  Primary malignant neoplasm of breast: Mother.  Health Maintenance  Health Maintenance  ???Pending?(in the next year)  ??? ??OverDue  ??? ? ? ?Diabetes Screening due??and every?  ??? ? ? ?Alcohol Misuse Screening due??01/01/20??and every 1??year(s)  ??? ? ? ?Smoking  Cessation due??01/01/20??Variable frequency  ??? ??Due?  ??? ? ? ?Colorectal Screening due??06/11/20??and every?  ??? ? ? ?Tetanus Vaccine due??06/11/20??and every 10??year(s)  ??? ??Due In Future?  ??? ? ? ?ADL Screening not due until??06/25/20??and every 1??year(s)  ??? ? ? ?Obesity Screening not due until??01/01/21??and every 1??year(s)  ??? ? ? ?Depression Screening not due until??05/21/21??and every 1??year(s)  ??? ? ? ?Blood Pressure Screening not due until??06/08/21??and every 1??year(s)  ??? ? ? ?Body Mass Index Check not due until??06/08/21??and every 1??year(s)  ??? ? ? ?Hypertension Management-Blood Pressure not due until??06/08/21??and every 1??year(s)  ??? ? ? ?Hypertension Management-BMP not due until??06/08/21??and every 1??year(s)  ???Satisfied?(in the past 1 year)  ??? ??Satisfied?  ??? ? ? ?ADL Screening on??06/25/19.??Satisfied by Gabi Valle LPN  ??? ? ? ?Blood Pressure Screening on??06/08/20.??Satisfied by Gladis Valdez RN  ??? ? ? ?Body Mass Index Check on??06/08/20.??Satisfied by Gladis Valdez RN  ??? ? ? ?Breast Cancer Screening on??04/30/20.??Satisfied by Alyssa Espinal  ??? ? ? ?Depression Screening on??05/21/20.??Satisfied by Neda Puentes LPN  ??? ? ? ?Diabetes Screening on??06/08/20.??Satisfied by Gaby Paris  ??? ? ? ?Hypertension Management-Blood Pressure on??06/08/20.??Satisfied by José Miguel GREY, Gladis Post  ??? ? ? ?Influenza Vaccine on??03/26/20.??Satisfied by Polina Sanchez  ??? ? ? ?Obesity Screening on??06/08/20.??Satisfied by José Miguel GREY, Gladis Post  ?  Lab Results  Test Name Test Result Date/Time   Sodium Lvl 139 mmol/L 06/15/2020 08:48 CDT   Potassium Lvl 4.2 mmol/L 06/15/2020 08:48 CDT   Chloride 109 mmol/L (High) 06/15/2020 08:48 CDT   CO2 26 mmol/L 06/15/2020 08:48 CDT   Calcium Lvl 8.8 mg/dL 06/15/2020 08:48 CDT   Glucose Lvl 97 mg/dL 06/15/2020 08:48 CDT   BUN 16 mg/dL 06/15/2020 08:48 CDT   Creatinine 0.70 mg/dL  06/15/2020 08:48 CDT   eGFR-AA >105 mL/min 06/15/2020 08:48 CDT   eGFR-NAY 93 mL/min 06/15/2020 08:48 CDT   Bili Total 0.4 mg/dL 06/15/2020 08:48 CDT   Bili Direct 0.1 mg/dL 06/15/2020 08:48 CDT   Bili Indirect 0.3 mg/dL 06/15/2020 08:48 CDT   AST 17 unit/L 06/15/2020 08:48 CDT   ALT 21 unit/L 06/15/2020 08:48 CDT   Alk Phos 91 unit/L 06/15/2020 08:48 CDT   Total Protein 7.4 gm/dL 06/15/2020 08:48 CDT   Albumin Lvl 3.3 gm/dL (Low) 06/15/2020 08:48 CDT   Globulin 4.10 gm/mL (High) 06/15/2020 08:48 CDT   A/G Ratio 0.8 ratio (Low) 06/15/2020 08:48 CDT   WBC 2.7 x10(3)/mcL (Low) 06/15/2020 08:48 CDT   RBC 4.13 x10(6)/mcL 06/15/2020 08:48 CDT   Hgb 11.2 gm/dL (Low) 06/15/2020 08:48 CDT   Hct 36.8 % 06/15/2020 08:48 CDT   Platelet 312 x10(3)/mcL 06/15/2020 08:48 CDT   MCV 89.1 fL 06/15/2020 08:48 CDT   MCH 27.1 pg 06/15/2020 08:48 CDT   MCHC 30.4 gm/dL (Low) 06/15/2020 08:48 CDT   RDW 19.0 % (High) 06/15/2020 08:48 CDT   MPV 8.7 fL 06/15/2020 08:48 CDT   Abs Neut 1.58 x10(3)/mcL (Low) 06/15/2020 08:48 CDT   Neutro Auto 59 % 06/15/2020 08:48 CDT   Lymph Auto 28 % 06/15/2020 08:48 CDT   Mono Auto 10 % 06/15/2020 08:48 CDT   Eos Auto 2 % 06/15/2020 08:48 CDT   Abs Eos 0.0 x10(3)/mcL 06/15/2020 08:48 CDT   Basophil Auto 1 % 06/15/2020 08:48 CDT   Abs Neutro 1.58 x10(3)/mcL (Low) 06/15/2020 08:48 CDT   Abs Lymph 0.7 x10(3)/mcL 06/15/2020 08:48 CDT   Abs Mono 0.3 x10(3)/mcL 06/15/2020 08:48 CDT   Abs Baso 0.0 x10(3)/mcL 06/15/2020 08:48 CDT

## 2022-05-01 NOTE — HISTORICAL [facility] CERNER
This is a historical note converted from [place]. Formatting and pictures may have been removed.  Please reference [facility] for original formatting and attached multimedia. Chief Complaint  metastatic breast cancer  History of Present Illness  Problem list  1. High Grade Triple Negative Left Breast Cancer  -Malignant left axillary and supraclavicular lymphadenopathy  -Originally diagnosed: 08/2017.  ?   Current Treatment Plan:  1. Gemzar 1000mg/m2 on days 1, 8, & 15 every 28 days.  Started 4/22/2020  C2D1?due 5/20/2020.  ?   Treatment History:  1. DDAC x4 cycles 10/26/2017-12/27/2017  2. DD Taxol x4 cycles 01/11/2018-02/27/2018  3. Carboplatin AUC 6 x9 cycles 03/19/2018-09/18/2018  4. Left lumpectomy level 1 ALN dissection: 01/19/2019  5. Palliative Abraxane/Tecentriq cycled every 4 weeks, until disease progression or unacceptable toxicity. Started 1/13/2020. Stopped 4/16/2020 after C3D1 due to progression.  ?   Past medical history:?Hypertension. ?Total hysterectomy in 2005 in [place] for uterine fibroids which were causing excessive vaginal bleeding, requiring blood transfusion. ?Family history of breast cancer and colon cancer. ?  Social history:?. ?3 children. ?Unemployed. ?Says that she is disabled. ?Has been smoking 5-6 cigarettes daily for last 20 years. ?Occasional beer or wine. ?Denies illicit drug abuse.  Family history:?Mother had breast cancer at age 70. ?Mothers niece had breast cancer and colon cancer at unknown age. ?Another niece of her mother had breast cancer at age 50.  Health maintenance:?Has never had screening colonoscopy done.  Menstrual and OB/GYN history:?Menarche at age 12. ?Had 3 pregnancies; first pregnancy at age 15. ?No abortions or miscarriages. ?No history of hormone replacement therapy. ?Status post total hysterectomy in 2005 for uterine fibroids.  ?   ?  History of present illness:  53-year-old female. ?Referred from [facility] for evaluation  and management of invasive ductal carcinoma.??  ?  For details, please see my last note dated 01/03/2020.? Please also refer to assessment and plan section where information is summarized.  ?   10/31/2018:  Presents for initial oncology consultation after moving to [place]. ?Last followed up with her oncologist at [facility] on 09/18/2018. ?Apparently, when she developed left supraclavicular lymph node metastasis, she was told that she has developed stage IV disease, and was palliatively treated with every 3 week carboplatin. ?Surgical consultation was apparently never obtained. ?On todays visit, she says that overall, she is doing quite well, and rates her general health is 9 on a scale of 1-10, if 10 could be the best. ?Main complaints are weight fluctuations, night sweats, lump in the neck, persistent lump in the left breast which, according to her, never shunt with neoadjuvant chemotherapy, cough, hemorrhoids, and anxiety. ?Reports pain all over the body, 9 on a scale of 1-10, if 10 could be the worst. ?She is asking for refill of her narcotics (oxycodone) as well as Xanax. ?Denies pain in the breast or nipple discharge. ?Appetite is good. ?No unintentional weight loss. ?No unusual headaches or focal neurological symptoms. ?No vision impairment. ?No chest pain or dyspnea. ?No fevers or chills. ?No abdominal pain, nausea, or vomiting. ?No blood in stool. ?No hematemesis, melena, or hematochezia. ?No dysphagia or odynophagia. ?No anorectal pain. ?No abnormal vaginal bleeding or discharge. ?No urinary problems.  ?   Interval History  5/14/2020: Patient presents for follow up on Gemzar; she is scheduled to receive C2D1 next week. She denies nausea, vomiting, diarrhea, constipation, mouth sores, abdominal pain. CMP stable. WBC 2.2; H&H 10.1 & 32.8; plts 270k; ANC 1170. She reports improvement of neuropathy but would like an increase in gabapentin. She also asks for refill of pain  medications. She complains of significant pain in the left chest and arm; her left breast is visibly larger than the right. She complains that it is heavier. Explained to her that left axillary biopsy was positive for cancer but the 2 other areas were negative. She reports having been told that by Dr. Navarrete. Discussed this with Dr. Kumar and that the patient will be seen in Breast clinic on 5/21/2020; this finding will not change the course of treatment. Will recheck labs in one week and again in 2 weeks; will have her follow up in 3 weeks with labs prior to C2D15. She is amenable to this plan.  Review of Systems  A complete 12-point?ROS was performed in full with pertinent positives as described in interval history. Remainder of ROS done in full and are negative.  Physical Exam  Vitals & Measurements  T:?36.9? ?C (Oral)? HR:?84(Peripheral)? RR:?20? BP:?144/83? SpO2:?99%?  BMI:?26.95?  General: ?Alert and oriented, No acute distress.??  Appearance: Well-groomed  HEENT: ?Normocephalic, Oral mucosa is moist.?Pupils are equal, round and reactive to light, Extraocular movements are intact, Normal conjunctiva.?  Neck: ?Supple, Non-tender, No lymphadenopathy, No thyromegaly.??  Respiratory: ?Lungs are clear to auscultation, Respirations are non-labored, Breath sounds are equal, Symmetrical chest wall expansion.??  Cardiovascular: ?Normal rate, Regular rhythm, No edema.??  Breast:??Breast exam not performed on todays visit.??Left breast larger, hanging lower than right breast. Bandage to left?supraclavicular lymph node.?  Gastrointestinal: Rounded,?Soft, Non-tender, Non-distended, Normal bowel sounds.??  Musculoskeletal: ?Normal strength.??  Integumentary: ?Warm, dry, intact.??  Neurologic: ?Alert, Oriented, No focal deficits, Cranial Nerves II-XII are grossly intact.??  Cognition and Speech: ?Oriented, Speech clear and coherent.??  Psychiatric: ?Cooperative, Appropriate mood & affect. ?  ECOG Performance Scale: 2 -  Capable of all self-care but unable to carry out any work activities. Up and about greater than 50 percent of waking hours.?  Assessment/Plan  1.?Malignant neoplasm of unspecified site of left female breast?C50.912  To summarize:  # High-grade triple negative left breast cancer,?axillary lymph nodes positive, diagnosed 08/2017  Neoadjuvant DDAC x4, then dose dense Taxol x4 (10/26/2017-02/27/2018)  Developed left supraclavicular malignant lymphadenopathy (biopsy 02/09/2018)  Treated with palliative carboplatin AUC 6 q3 weeks x 9 (03/2018-09/2018)?at outside facility  Left breast mass, 3 cm by palpation, never?shrunk with chemotherapy  No distant metastasis?(PET/CT: 11/2018)  Left breast lumpectomy, SLN biopsy (01/09/2019)  -->  ypT2 pN0(sn), triple negative  PD-L1 tumor proportion >1 (expressed)  She declined adjuvant radiation therapy and adjuvant Capecitabine  Noncompliant with follow-up  >>  Disease relapse/progression (07/2019) (left axillary, left supraclavicular lymphadenopathy)  Pain and tenderness over lower cervical/upper thoracic spine?(NO metastasis?in spine)  -Status post palliative radiation therapy to painful, tender?malignant lymphadenopathy (09/05/2019-09/27/2019), without significant decrease in size of left neck mass/lymphadenopathy  -Restaging CTs and bone scan (12/30/2019):?No disease progression;?interval response to radiation therapy?with left supraclavicular necrotic lymph nodes?smaller  -Baseline tumor markers within normal limits (11/20/2019)  -Started Tecentriq/Abraxane 01/13/2020  -Started?third cycle of chemotherapy 03/20/2020  -->Thereafter,?did not present for chemotherapy secondary to severe peripheral neuropathy?(side effect of Abraxane)  -Restaging CT C/A/P?(04/15/2020):?Stable left axillary lymph nodes  (CT soft tissue of the neck pending)  -04/16/2020:?Appearance of inflammatory carcinoma of left breast;?left breast?enlarging, painful, and tender;?extremely tender,?firm soft tissue  fullness/lymphadenopathy left supraclavicular area?and left side of the neck  -->?She has failed Tecentriq/Abraxane  -->?She has also experienced severe peripheral neuropathy?as side effect of Abraxane, grade 4  -Restaging CT?soft tissue of the neck (04/24/2020):?Enlargement of left supraclavicular necrotic lymph node;?worsening of generalized?degenerative edema throughout the left supraclavicular, pectoral,?axillary region; enlargement of the left subpectoral?axillary lymph node at the edge of the field-of-view  -Restaging bone scan (04/24/2020):?No bone metastasis  >>  -Gemcitabine started 04/22/2020  -Genetic testing?in 2017 in [place]: variant of uncertain significance (VUS): CDKN2A c.217A>C. This variant is predicted to be benign per in silico analysis (PolyPhen score 0.0).  ---------  ?   # Status post surgical menopause (total hysterectomy for uterine fibroids:?2005)  ----------  ?   # Extremely noncompliant with follow-up?in the past  ---------  ?  ?   Plan:  After starting C3D1?of Tecentriq/Abraxane on 03/20/2020,?has been unable to continue chemotherapy because of severe peripheral neuropathy?(side effect of Abraxane)  Needed to change chemotherapy.  Avoid?chemotherapeutic agents with neuropathy potential?(for example,?paclitaxel, eribulin,?vinorelbine, etc.)  Switched?to?gemcitabine?04/22/2020  Chemotherapy schedule:  Gemcitabine 800 to 1200 mg/m? IV days 1, 8, and 15;  Cycled every 28 days  ?   She has family history of breast cancer.?  Genetic?testing in 2017 in [place]:?Negative for BRCA1/2  ?   Check for MSI-H/dMMR.  Check for NTRK gene fusion.  ?   Baseline tumor markers were within normal limits. ?Therefore,?will not be helpful for follow-up.  ?  Has been extremely noncompliant with follow-up?in the past.  No follow-up?between December 2018 and June 2019.? Even subsequently, continued to miss/reschedule appointments.  Missed appointment on 10/30/2019?also.  Missed appointment?for restaging  CTs, with considerable delay.  When seen on 11/20/2019,?she was supposed to follow-up with me in 2 weeks?with restaging scans?but she canceled appointment for restaging scans and did not get them done?until 12/30/2019.  Earlier,?after lumpectomy,?she decided not to pursue adjuvant radiation therapy and adjuvant Capecitabine?for persistent?disease?in triple negative setting?post neoadjuvant chemotherapy.  ?  She refused?screening colonoscopy for a long time.? At last, after she noticed some blood in stool,?scheduled it for 9 January.?  ?  Biopsy from left axillary lymph node/mass at 2:00: Fibroadipose tissue with necrosis and foci of poorly differentiated carcinoma.  ?  Continue Gemzar.  Check CBC, CMP in 1 week to be reviewed by infusion nurse.  If labs WNL, ok to proceed with C2D1 on 5/20/2020.  Check CBC, CMP on 5/27/2020 to be reviewed by infusion nurse.  If labs WNL, ok to proceed with C2D8 on 5/27/2020.  Follow up in?3 weeks with CBC, CMP prior to C2D15.  Restage with contrast-enhanced CT scans of soft tissue of the neck, chest, abdomen, pelvis in 2 months (late June); scheduled for 6/29/2020.  TTE for evaluation of LVEF has been ordered; scheduled for 5/19/2020.  Ordered:  ?  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 3 times daily?  Avoid chemotherapy with neuropathy potential (for example, paclitaxel, eribulin, vinorelbine, etc.)  ?  Continue Cymbalta 60 mg nightly and gabapentin twice daily for severe peripheral neuropathy.  Increase gabapentin to 600mg TID for peripheral neuropathy.  Ordered:  ?  3.?Cancer-related pain?G89.3  Extremely tender,?firm soft tissue fullness/lymphadenopathy left supraclavicular area?and left side of the neck  On MS Contin 30mg po BID,  oxycodone 20mg po q4hr prn breakthrough pain  ?   Continue narcotics for pain.  Will refill MS Contin with start date of tomorrow; refill per patients request.  Will refill Oxycodone per patients request.  Ordered:  ?   Problem List/Past Medical History  Ongoing  Axillary lymphadenopathy  Cancer of left breast, stage 4  Cancer-related pain  Chemotherapy-induced peripheral neuropathy  Malignant neoplasm of unspecified site of left female breast  Obesity  Supraclavicular lymphadenopathy  Historical  No qualifying data  Procedure/Surgical History  Biopsy or excision of lymph node(s); by needle, superficial (eg, cervical, inguinal, axillary) (04/30/2020)  Biopsy, breast, with placement of breast localization device(s) (eg, clip, metallic pellet), when performed, and imaging of the biopsy specimen, when performed, percutaneous; each additional lesion, including ultrasound guidance (List separately in additi (04/30/2020)  Biopsy, breast, with placement of breast localization device(s) (eg, clip, metallic pellet), when performed, and imaging of the biopsy specimen, when performed, percutaneous; first lesion, including ultrasound guidance (04/30/2020)  Biopsy Sentinal Node (None) (01/09/2019)  Excision of Left Breast, Open Approach (01/09/2019)  Lumpectomy (None) (01/09/2019)  Mastectomy, partial (eg, lumpectomy, tylectomy, quadrantectomy, segmentectomy); with axillary lymphadenectomy (01/09/2019)  Resection of Left Axillary Lymphatic, Open Approach (01/09/2019)  Hysterectomy (01/01/2005)  BTL  port   Medications  alPRAzolam 1 mg oral tablet, 1 mg= 1 tab(s), Oral, BID  amlodipine 5 mg oral tablet, 5 mg= 1 tab(s), Oral, Now  atorvastatin 10 mg oral tablet, 10 mg= 1 tab(s), Oral, Daily  Cymbalta 60 mg oral delayed release capsule, 60 mg= 1 cap(s), Oral, At Bedtime  gabapentin 300 mg oral capsule, 300 mg= 1 cap(s), Oral, TID  gabapentin 400 mg oral capsule, 400 mg= 1 cap(s), Oral, TID, 3 refills  lisinopril 40 mg oral tablet,  40 mg= 1 tab(s), Oral, Daily  megestrol 40 mg/mL oral suspension, 800 mg= 20 mL, Oral, Daily, 1 refills  MS Contin 30 mg oral tablet, extended release, 30 mg= 1 tab(s), Oral, q12hr,? ?Not taking  oxyCODONE 10 mg oral tablet, 10 mg= 1 tab(s), Oral, q6hr, PRN,? ?Not taking  oxyCODONE 20 mg oral tablet, 20 mg= 1 tab(s), Oral, q4hr, PRN  Phenergan 12.5 mg Tab, 12.5 mg= 1 tab(s), Oral, q4hr, PRN  Zofran (for IVPB) 16 mg + dexamethasone 10 mg/mL injectable solution 10 mg  Allergies  No Known Allergies  Social History  Abuse/Neglect  No, 05/14/2020  Alcohol  Current, 09/22/2019  Current, Beer, Wine, 1-2 times per month, Alcohol use interferes with work or home: No. Drinks more than intended: No. Others hurt by drinking: No. Household alcohol concerns: No., 11/08/2018  Employment/School  medical disability, Work/School description: housekeeping. Previous employment/school: 11th grade. Activity level: Moderate physical work., 10/31/2018  Home/Environment  Lives with living with sister/ some days sleeps in her car or in a hotel. Living situation: Homeless/Shelter. Alcohol abuse in household: No. Substance abuse in household: No. Smoker in household: No. Family/Friends available for support: Yes. Concern for family members at home: No. Major illness in household: No. Financial concerns: Yes. TV/Computer concerns: No., 10/31/2018  Nutrition/Health  Type of diet: homeless/ occasional meals given by sister. Regular, Wants to lose weight: No., 12/04/2018  Sexual  Gender Identity Identifies as female., 07/16/2019  Substance Use  Current, Marijuana, Daily, 01/27/2020  Never, 01/02/2019  Tobacco  10 or more cigarettes (1/2 pack or more)/day in last 30 days, Yes, Cigarettes, 5 per day., 05/14/2020  Family History  Cardiac arrhythmia.: Father.  Congestive heart disease.: Mother.  Diabetes mellitus type 2: Mother.  Hyperlipidemia.: Mother.  Hypertension.: Mother and Father.  Primary malignant neoplasm of breast: Mother.  Health  Maintenance  Health Maintenance  ???Pending?(in the next year)  ??? ??OverDue  ??? ? ? ?Diabetes Screening due??and every?  ??? ? ? ?Alcohol Misuse Screening due??01/01/20??and every 1??year(s)  ??? ? ? ?Smoking Cessation due??01/01/20??Variable frequency  ??? ??Due?  ??? ? ? ?Colorectal Screening due??05/14/20??and every?  ??? ? ? ?Medicare Annual Wellness Exam due??05/14/20??and every 1??year(s)  ??? ? ? ?Tetanus Vaccine due??05/14/20??and every 10??year(s)  ??? ??Due In Future?  ??? ? ? ?ADL Screening not due until??06/25/20??and every 1??year(s)  ??? ? ? ?Obesity Screening not due until??01/01/21??and every 1??year(s)  ??? ? ? ?Depression Screening not due until??01/02/21??and every 1??year(s)  ???Satisfied?(in the past 1 year)  ??? ??Satisfied?  ??? ? ? ?ADL Screening on??06/25/19.??Satisfied by [place] LPN, Beonka S  ??? ? ? ?Blood Pressure Screening on??05/14/20.??Satisfied by [place] LPN, Beonka S  ??? ? ? ?Body Mass Index Check on??05/14/20.??Satisfied by [place] LPN, Beonka S  ??? ? ? ?Breast Cancer Screening on??04/30/20.??Satisfied by Alyssa Espinal  ??? ? ? ?Depression Screening on??01/03/20.??Satisfied by Kateryna Jenkins  ??? ? ? ?Diabetes Screening on??05/14/20.??Satisfied by Jaimee Louie  ??? ? ? ?Hypertension Management-Blood Pressure on??05/14/20.??Satisfied by [place] LPN, Beonka S  ??? ? ? ?Influenza Vaccine on??03/26/20.??Satisfied by Polina Sanchez  ??? ? ? ?Obesity Screening on??05/14/20.??Satisfied by Gabi Valle LPN  ?  Lab Results  Test Name Test Result Date/Time   Sodium Lvl 142 mmol/L 05/14/2020 10:10 CDT   Potassium Lvl 3.8 mmol/L 05/14/2020 10:10 CDT   Chloride 111 mmol/L (High) 05/14/2020 10:10 CDT   CO2 25 mmol/L 05/14/2020 10:10 CDT   Calcium Lvl 8.7 mg/dL 05/14/2020 10:10 CDT   Glucose Lvl 126 mg/dL (High) 05/14/2020 10:10 CDT   BUN 11 mg/dL 05/14/2020 10:10 CDT   Creatinine 0.80 mg/dL 05/14/2020 10:10 CDT   eGFR-AA 97 mL/min 05/14/2020 10:10 CDT    eGFR-NAY 80 mL/min (Low) 05/14/2020 10:10 CDT   Bili Total 0.2 mg/dL 05/14/2020 10:10 CDT   Bili Direct <0.1 mg/dL 05/14/2020 10:10 CDT   Bili Indirect calc not vaild 05/14/2020 10:10 CDT   AST 14 unit/L (Low) 05/14/2020 10:10 CDT   ALT 22 unit/L 05/14/2020 10:10 CDT   Alk Phos 84 unit/L 05/14/2020 10:10 CDT   Total Protein 7.1 gm/dL 05/14/2020 10:10 CDT   Albumin Lvl 3.1 gm/dL (Low) 05/14/2020 10:10 CDT   Globulin 4.00 gm/mL (High) 05/14/2020 10:10 CDT   A/G Ratio 0.8 ratio (Low) 05/14/2020 10:10 CDT   WBC 2.2 x10(3)/mcL (Low) 05/14/2020 10:10 CDT   RBC 3.66 x10(6)/mcL (Low) 05/14/2020 10:10 CDT   Hgb 10.1 gm/dL (Low) 05/14/2020 10:10 CDT   Hct 32.8 % (Low) 05/14/2020 10:10 CDT   Platelet 270 x10(3)/mcL 05/14/2020 10:10 CDT   MCV 89.6 fL 05/14/2020 10:10 CDT   MCH 27.6 pg 05/14/2020 10:10 CDT   MCHC 30.8 gm/dL (Low) 05/14/2020 10:10 CDT   RDW 19.9 % (High) 05/14/2020 10:10 CDT   MPV 8.1 fL 05/14/2020 10:10 CDT   Abs Neut 1.17 x10(3)/mcL (Low) 05/14/2020 10:10 CDT       Audiology referral for complaint of bilateral tinnitus.  Will provide patient with list of optometrists for complaint of blurred vision.

## 2022-05-01 NOTE — HISTORICAL [facility]ERNER
This is a historical note converted from [employer]. Formatting and pictures may have been removed.  Please reference [employer] for original formatting and attached multimedia. Chief Complaint  Breast Cancer  History of Present Illness  Problem list  1. High Grade Triple Negative Left Breast Cancer  -Malignant left axillary and supraclavicular lymphadenopathy  -Originally diagnosed: 08/2017.  -Negative for BRCA1/2  -MMR expression preserved  -NTRK gene fusion negative  -PD-L1?TPS >1 (expressed)  Genetic?testing in 2017 in [place]:?Negative for BRCA1/2  ?  Current Treatment:  1. Gemzar 1000mg/m2 on days 1, 8, & 15 every 28 days.  Started 4/22/2020  ?   C5D1 due 10/14/2020  C5D8 due: 10/21/2020  ?   Treatment History:  1. DDAC x4 cycles 10/26/2017-12/27/2017  2. DD Taxol x4 cycles 01/11/2018-02/27/2018  3. Carboplatin AUC 6 x9 cycles 03/19/2018-09/18/2018  4. Left lumpectomy level 1 ALN dissection: 01/19/2019  5. Palliative Abraxane/Tecentriq cycled every 4 weeks, until disease progression or unacceptable toxicity. Started 1/13/2020. Stopped 4/16/2020 after C3D1 due to progression.  ?   History of present illness:  53-year-old female. ?Referred from [facility] for evaluation and management of invasive ductal carcinoma.??  ?  For details, please see?MD last note dated 01/03/2020.? Please also refer to assessment and plan section where information is summarized.  ?   Interval History:  Patient presents today for a scheduled follow-up, alone, prior to C5D1 gemzar. She presented to urgent care on 10/07/2020 because a family member scratched her below the eye and she wanted a tetanus shot. She also wanted a left neck wound to be evaluated. She was given antibiotics and a surgery consultation. She states that her only issue is the fungating tumor to her left neck base which really bothers her. She states that it smells bad and hurts her extremely bad. She states that she is taking Norco around  the clock every 4 hours as prescribed which is not controlling her pain. She states that she does not like the fentanyl patches because they are too strong and all she does is sleep all day. Denies HA, fever, night sweats, SOB, CP, edema, neuropathy. Denies N/V/C/D, abdominal pain, change in bowel/bladder habits, blood in the urine/stool. Appetite good, weight stable, denies unintentional weight loss/gain. Labs today stable, adequate for treatment, reviewed with patient. Reviewed patient medications. Patient denies needing any medication refills at this time. Denies questions/needs/concerns.  ?  Review of Systems  A complete 12 point ROS was performed in full with pertinent positives described in interval history. Remainder of ROS done in full and are negative.?  Physical Exam  Vitals & Measurements  T:?37.0? ?C (Oral)? HR:?89(Peripheral)? RR:?18? BP:?139/86?  HT:?155.00?cm? WT:?70.600?kg? BMI:?29.39?  Vital Signs Reviewed  General: AAO, NAD noted  Eye: PERRLA, EOMI  Neuro: Normal mentation, strength 5/5 on all 4 extremities, no sensory deficits  HET: Normocephalic, adequate hearing,?no oral ulcers, mucous membranes pink/moist/intact, no pharyngeal erythema, poor dentition  Neck: left neck base with fungating tumor, foul smelling odor upon removing gauze, measures approximately 4cm x 3 cm at its largest point. Draining mild amount of mucopurulent drainage.  Respiratory: Non-labored breathing, symmetrical chest wall expansion, BBS CTA, no crackles/wheezing/rhonchi noted  Cardiovascular: S1S2 noted, RRR, no M/R/G, pulses equal in all extremities, normal peripheral perfusion  Abdomen: Soft, non-tender, non-distended, BS+, no hepatosplenomegaly  Musculoskeletal: Normal ROM, normal gait, ambulates without assistance  Integumentary: No rashes, no bruises or purpura, warm and dry, normal for ethnicity  Neurologic: No focal deficits, Cranial nerves II-XII are grossly intact.  Cognition and Speech: Speech clear and coherent,  functional cognition intact  Psychiatric: Cooperative, appropriate mood and affect for situation, normal judgement, no suicidal or homicidal ideations  ?  ?   The National [employer] Toxicity Scores:  ?   ECOG Performance Scale: 2 - Ambulatory and capable of all selfcare but unable to carry out any work activities; up and about more than 50% of waking hours.  ?  Assessment/Plan  1.?Malignant neoplasm of unspecified site of left female breast?C50.912  ? ?# High-grade triple negative left breast cancer,?axillary lymph nodes positive, diagnosed 08/2017  Neoadjuvant DDAC x4, then dose dense Taxol x4 (10/26/2017-02/27/2018)  ?   ? >>>  Progression?while on neoadjuvant Taxol:  Developed left supraclavicular malignant lymphadenopathy (biopsy 02/09/2018)  Treated with palliative carboplatin AUC 6 q3 weeks x 9 (03/2018-09/2018)?at outside facility  Left breast mass, 3 cm by palpation, never?shrunk with chemotherapy  No distant metastasis?(PET/CT: 11/2018)  Left breast lumpectomy, SLN biopsy (01/09/2019)  -->ypT2 pN0(sn), triple negative  PD-L1 tumor proportion >?1% (expressed)  She declined adjuvant radiation therapy and adjuvant Capecitabine  Noncompliant with follow-up  ?   ? >>>  Progression:  Disease relapse/progression (07/2019) (left axillary, left supraclavicular lymphadenopathy)  Pain and tenderness over lower cervical/upper thoracic spine?(NO metastasis?in spine)  -Status post palliative radiation therapy to painful, tender?malignant lymphadenopathy (09/05/2019-09/27/2019), without significant decrease in size of left neck mass/lymphadenopathy  -Restaging CTs and bone scan (12/30/2019):?No disease progression;?interval response to radiation therapy?with left supraclavicular necrotic lymph nodes?smaller  -Baseline tumor markers within normal limits (11/20/2019)  -Started Tecentriq/Abraxane 01/13/2020  -Started?third cycle of chemotherapy 03/20/2020  -->Thereafter,?did not present for chemotherapy secondary to  severe peripheral neuropathy?(side effect of Abraxane)  -Restaging CT C/A/P?(04/15/2020, status post chemotherapy x3):?Stable left axillary lymph nodes  ?   ? 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:  -04/16/2020:?Appearance of inflammatory carcinoma of left breast;?left breast?enlarging, painful, and tender;?extremely tender,?firm soft tissue fullness/lymphadenopathy left supraclavicular area?and left side of the neck  -->?She has failed Tecentriq/Abraxane  -->?She has also experienced severe peripheral neuropathy?as side effect of Abraxane, grade 4  -Restaging CT?soft tissue of the neck (04/24/2020):?Enlargement of left supraclavicular necrotic lymph node;?worsening of generalized?degenerative edema throughout the left supraclavicular, pectoral,?axillary region; enlargement of the left subpectoral?axillary lymph node at the edge of the field-of-view  -Restaging bone scan (04/24/2020):?No bone metastasis  -Gemcitabine started 04/22/2020  Gemcitabine dose decreased by 50%?from?C1?D15?onwards (neutropenia)  -Genetic testing?in 2017 in [place]: ? variant of uncertain significance (VUS): CDKN2A c.217A>C. This variant is predicted to be benign per in silico analysis (PolyPhen score 0.0).  -04/30/2020:?Core biopsy of?left breast lesion positive for?poorly differentiated carcinoma  -Severe neutropenia (ANC 54 mm?)?05/20/2020;?second cycle of gemcitabine held  -05/20/2020: LVEF?60%?(TTE)  ? -No showed for chemotherapy on 05/27/2020 and 06/01/2020?  -C2 gemcitabine started 06/08/2020 (was supposed to start 05/20/2020; delayed secondary to neutropenia)  -Gemcitabine dose decreased by 50% from C1 D15 onwards already  -Completed C2?gemcitabine on 06/22/2020 (C2?D15)  -No worsening metastatic disease, improved left axillary lymphadenopathy?(post gemcitabine x2?cycles)?(restaging CTs?C/A/P/soft tissues of the neck:?06/29/2020)  -Third cycle of gemcitabine completed  07/20/2020  - ? Fourth cycle delayed (for noncompliance)  - ? Restaging CT C/A/P post ?gemcitabine ?x3 ( 09/10/2020): ?No metastasis  -Restaging CT soft of the neck with contrast ? ?post gemcitabine x3 (09/10/2020 ): ?Stable left supraclavicular cutaneous/subcutaneous lesion  ---------  ?   # ? Sites of disease:  Left breast; left supraclavicular lymph nodes; left axillary lymph nodes; status post left breast lumpectomy; recurrent symptoms in left breast  ? -PD-L1 tumor proportion score >1 (expressed)  -BRCA1/2 negative (2017, [place])  ? -MMR expression preserved  -NTRK gene fusion negative  ?   # Status post ? surgical menopause (total hysterectomy for uterine fibroids:?2005)  ?   # Extremely noncompliant with follow-up?in the past  ?  ?  ? Plan:  ?-On imaging,?stable/improving?metastatic disease post gemcitabine x2  -Stable disease post chemotherapy x3  -Continue gemcitabine?(dose decreased by 50% from?C1 D15 onwards?secondary to neutropenia)  -Clinical suggestion of inflammatory carcinoma of left breast?(biopsy positive for poorly?differentiated carcinoma)?(recurrence)  ? -Restage?with contrast-enhanced CTs?C/A/P and softness of the neck?in 2 months (early November)  ?  ?-To the extent possible,?avoid chemotherapy with neuropathy potential (for example, paclitaxel, eribulin, vinorelbine, etc.)  -If disease progresses,?then sacituzumab?will be a reasonable option  -Negative for BRCA1/2; MMR expression preserved; NTRK gene fusion negative; PD-L1?TPS >1 (expressed)  -Continue narcotics for pain  -Check CBC and CMP weekly?during chemotherapy with gemcitabine  ?  ? Discussion:  ? Chemotherapy schedule:  Gemcitabine 800 to 1200 mg/m? IV days 1, 8, and 15;  Cycled every 28 days  ?  ? Dose modifications?for myelotoxicity:  -This regimen should not be initiated unless the white blood cell count is >3500 cells/microL and platelets are??100,000/microL  -During therapy, the dose of gemcitabine should be decreased by 25%  if the absolute neutrophil count decreases to <1000 cells/microL but??500 cells/microL, or the platelets decrease to <100,000/microL and??50,000/microL  -The [place] Prescribing Information recommends holding gemcitabine for an absolute neutrophil count <500 cells/microL or platelets <50,000/microL.  ?  -Proceed with C5D1 Gemzar 10/14/2020  -Proceed with C5D8 Gemzar 10/21/2020 with labs: CBC, CMP, Mg  -Restaging CT C/A/P/Neck scheduled: 11/10/2020, will move back one week due to treatment schedule  -Stop Fentanyl patches  -Start Oxycontin 5 mg oral tablet BID  -C/W [place] PRN for breakthrough pain  ?  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 3 times daily  ?  -C/W Cymbalta and Gabapentin  ?  3.?History of noncompliance with medical treatment?Z91.19  ? ? Has been extremely noncompliant with follow-up?in the past.  No follow-up?between December 2018 and June 2019.? Even subsequently, continued to miss/reschedule appointments.  Missed appointment on 10/30/2019?also.  Missed appointment?for restaging CTs, with considerable delay.  When seen on 11/20/2019,?she was supposed to follow-up with me in 2 weeks?with restaging scans?but she canceled appointment for restaging scans and did not get them done?until 12/30/2019.  Earlier,?after lumpectomy,?she decided not to pursue adjuvant radiation therapy and adjuvant Capecitabine?for persistent?disease?in triple negative setting?post neoadjuvant chemotherapy.  ? Patient had not presented for follow up or treatment for 2 months. (Summer 2020)  -Compliance encouraged  ?  RTC 10/28/2020 with NP with labs: CBC, CMP, Mg  RTC 11/11/2020 with MD with labs: CBC, CMP, Mg to review restaging imaging  ?  Discussed POC with patient, all  questions answered. Instructed to call clinic for any issues or with any questions PRN, patient verbalizes understanding.  ?   DAREN Howell, FNP-C   Problem List/Past Medical History  Ongoing  Axillary lymphadenopathy  Cancer of left breast, stage 4  Cancer-related pain  Chemotherapy-induced peripheral neuropathy  History of noncompliance with medical treatment  Malignant neoplasm of unspecified site of left female breast  Obesity  Supraclavicular lymphadenopathy  Historical  No qualifying data  Procedure/Surgical History  Biopsy or excision of lymph node(s); by needle, superficial (eg, cervical, inguinal, axillary) (04/30/2020)  Biopsy, breast, with placement of breast localization device(s) (eg, clip, metallic pellet), when performed, and imaging of the biopsy specimen, when performed, percutaneous; each additional lesion, including ultrasound guidance (List separately in additi (04/30/2020)  Biopsy, breast, with placement of breast localization device(s) (eg, clip, metallic pellet), when performed, and imaging of the biopsy specimen, when performed, percutaneous; first lesion, including ultrasound guidance (04/30/2020)  Biopsy Sentinal Node (None) (01/09/2019)  Excision of Left Breast, Open Approach (01/09/2019)  Lumpectomy (None) (01/09/2019)  Mastectomy, partial (eg, lumpectomy, tylectomy, quadrantectomy, segmentectomy); with axillary lymphadenectomy (01/09/2019)  Resection of Left Axillary Lymphatic, Open Approach (01/09/2019)  Hysterectomy (01/01/2005)  BTL  port   Medications  alPRAzolam 1 mg oral tablet, 1 mg= 1 tab(s), Oral, BID  amlodipine 5 mg oral tablet, 5 mg= 1 tab(s), Oral, Now  atorvastatin 10 mg oral tablet, 10 mg= 1 tab(s), Oral, Daily  Bactrim  mg-160 mg oral tablet, 1 tab(s), Oral, BID  Bactroban 2% Ointment (22.5 gmTube), 1 dennis, TOP, BID  Cymbalta 60 mg oral delayed release capsule, 60 mg= 1 cap(s), Oral, At Bedtime, 1 refills  dexamethasone (for IVPB)  fentaNYL 25 mcg/hr  transdermal film, extended release, 1 patch(es), TOP, q72hr  gabapentin 600 mg oral tablet, 600 mg= 1 tab(s), Oral, TID, 2 refills  Heparin Flush 100 U/mL - 5 mL, 500 units= 5 mL, IV Push, Once-chemo  lisinopril 40 mg oral tablet, 40 mg= 1 tab(s), Oral, Daily  megestrol 40 mg/mL oral suspension, 800 mg= 20 mL, Oral, Daily, 1 refills  MS Contin 30 mg oral tablet, extended release, 30 mg= 1 tab(s), Oral, q12hr  Norco 10 mg-325 mg oral tablet, 1 tab(s), Oral, q4hr, PRN  Norco 10 mg-325 mg oral tablet, 1 tab(s), Oral, q4hr, PRN  ondansetron (for IVPB)  Phenergan 12.5 mg Tab, 12.5 mg= 1 tab(s), Oral, q4hr, PRN  Zofran (for IVPB) 16 mg + dexamethasone 10 mg/mL injectable solution 10 mg  Allergies  No Known Allergies  Social History  Abuse/Neglect  No, 10/07/2020  No, No, Yes, 09/30/2020  Alcohol  Current, Beer, 09/30/2020  Employment/School  medical disability, Work/School description: housekeeping. Previous employment/school: 11th grade. Activity level: Moderate physical work., 10/31/2018  Home/Environment  Lives with living with sister/ some days sleeps in her car or in a hotel. Living situation: Homeless/Shelter. Alcohol abuse in household: No. Substance abuse in household: No. Smoker in household: No. Family/Friends available for support: Yes. Concern for family members at home: No. Major illness in household: No. Financial concerns: Yes. TV/Computer concerns: No., 10/31/2018  Nutrition/Health  Type of diet: homeless/ occasional meals given by sister. Regular, Wants to lose weight: No., 12/04/2018  Sexual  Gender Identity Identifies as female., 07/16/2019  Substance Use  Current, Marijuana, Daily, 09/30/2020  Tobacco  10 or more cigarettes (1/2 pack or more)/day in last 30 days, No, 10/07/2020  4 or less cigarettes(less than 1/4 pack)/day in last 30 days, No, Cigarettes, 10 per day., 09/30/2020  Family History  Cardiac arrhythmia.: Father.  Congestive heart disease.: Mother.  Diabetes mellitus type 2:  Mother.  Hyperlipidemia.: Mother.  Hypertension.: Mother and Father.  Primary malignant neoplasm of breast: Mother.  Immunizations  Vaccine Date Status   tetanus/diphtheria/pertussis, acel(Tdap) 10/07/2020 Given   Health Maintenance  Health Maintenance  ???Pending?(in the next year)  ??? ??OverDue  ??? ? ? ?Influenza Vaccine due??10/01/19??and every 1??day(s)  ??? ? ? ?Smoking Cessation due??01/01/20??Variable frequency  ??? ? ? ?Alcohol Misuse Screening due??01/02/20??and every 1??year(s)  ??? ? ? ?ADL Screening due??06/25/20??and every 1??year(s)  ??? ??Due?  ??? ? ? ?Colorectal Screening due??10/13/20??and every?  ??? ? ? ?Medicare Annual Wellness Exam due??10/13/20??and every 1??year(s)  ??? ? ? ?Zoster Vaccine due??10/13/20??and every?  ??? ??Due In Future?  ??? ? ? ?Obesity Screening not due until??01/01/21??and every 1??year(s)  ??? ? ? ?Depression Screening not due until??09/30/21??and every 1??year(s)  ??? ? ? ?Hypertension Management-BMP not due until??09/30/21??and every 1??year(s)  ??? ? ? ?Blood Pressure Screening not due until??10/07/21??and every 1??year(s)  ??? ? ? ?Body Mass Index Check not due until??10/07/21??and every 1??year(s)  ??? ? ? ?Hypertension Management-Blood Pressure not due until??10/07/21??and every 1??year(s)  ???Satisfied?(in the past 1 year)  ??? ??Satisfied?  ??? ? ? ?Blood Pressure Screening on??10/07/20.??Satisfied by Nick Jenkins LPN  ??? ? ? ?Body Mass Index Check on??10/07/20.??Satisfied by Nick Jenkins LPN  ??? ? ? ?Breast Cancer Screening on??04/30/20.??Satisfied by Alyssa Espinal  ??? ? ? ?Depression Screening on??09/30/20.??Satisfied by Gabi Valle LPN  ??? ? ? ?Diabetes Screening on??09/30/20.??Satisfied by Sue Bolton  ??? ? ? ?Hypertension Management-Blood Pressure on??10/07/20.??Satisfied by Nick Jenkins LPN  ??? ? ? ?Influenza Vaccine on??09/30/20.??Satisfied by Julianna Sosa  ??? ? ? ?Obesity Screening on??10/07/20.??Satisfied by  Nick Jenkins LPN  ??? ? ? ?Tetanus Vaccine on??10/07/20.??Satisfied by Nick Jenkins LPN  ?  Lab Results  Test Name Test Result Date/Time   Sodium Lvl 139 mmol/L 10/14/2020 09:10 CDT   Potassium Lvl 4.2 mmol/L 10/14/2020 09:10 CDT   Chloride 108 mmol/L (High) 10/14/2020 09:10 CDT   CO2 27 mmol/L 10/14/2020 09:10 CDT   Calcium Lvl 9.4 mg/dL 10/14/2020 09:10 CDT   Glucose Lvl 123 mg/dL (High) 10/14/2020 09:10 CDT   BUN 15 mg/dL 10/14/2020 09:10 CDT   Creatinine 0.90 mg/dL 10/14/2020 09:10 CDT   eGFR-AA 84 mL/min (Low) 10/14/2020 09:10 CDT   eGFR-NAY 70 mL/min (Low) 10/14/2020 09:10 CDT   Bili Total 0.2 mg/dL 10/14/2020 09:10 CDT   Bili Direct <0.1 mg/dL 10/14/2020 09:10 CDT   Bili Indirect UNABLE TO CALC 10/14/2020 09:10 CDT   AST 26 unit/L 10/14/2020 09:10 CDT   ALT 38 unit/L 10/14/2020 09:10 CDT   Alk Phos 87 unit/L 10/14/2020 09:10 CDT   Total Protein 7.6 gm/dL 10/14/2020 09:10 CDT   Albumin Lvl 3.6 gm/dL 10/14/2020 09:10 CDT   Globulin 4.00 gm/mL (High) 10/14/2020 09:10 CDT   A/G Ratio 0.9 ratio (Low) 10/14/2020 09:10 CDT   WBC 4.1 x10(3)/mcL (Low) 10/14/2020 09:10 CDT   RBC 4.50 x10(6)/mcL 10/14/2020 09:10 CDT   Hgb 12.0 gm/dL 10/14/2020 09:10 CDT   Hct 40.3 % 10/14/2020 09:10 CDT   Platelet 514 x10(3)/mcL (High) 10/14/2020 09:10 CDT   MCV 89.6 fL 10/14/2020 09:10 CDT   MCH 26.7 pg 10/14/2020 09:10 CDT   MCHC 29.8 gm/dL (Low) 10/14/2020 09:10 CDT   RDW 19.1 % (High) 10/14/2020 09:10 CDT   MPV 8.5 fL 10/14/2020 09:10 CDT   Abs Neut 2.77 x10(3)/mcL 10/14/2020 09:10 CDT   Neutro Auto 68 % 10/14/2020 09:10 CDT   Lymph Auto 22 % 10/14/2020 09:10 CDT   Mono Auto 9 % 10/14/2020 09:10 CDT   Eos Auto 1 % 10/14/2020 09:10 CDT   Abs Eos 0.0 x10(3)/mcL 10/14/2020 09:10 CDT   Basophil Auto 0 % 10/14/2020 09:10 CDT   Abs Neutro 2.77 x10(3)/mcL 10/14/2020 09:10 CDT   Abs Lymph 0.9 x10(3)/mcL 10/14/2020 09:10 CDT   Abs Mono 0.4 x10(3)/mcL 10/14/2020 09:10 CDT   Abs Baso 0.0 x10(3)/mcL 10/14/2020 09:10 CDT   IG% 0 % 10/14/2020  09:10 CDT       Will consult wound care for fungating malignant supraclavicular left neck wound.

## 2022-05-01 NOTE — HISTORICAL [facility] CERNER
This is a historical note converted from [facility]. Formatting and pictures may have been removed.  Please reference [facility] for original formatting and attached multimedia. History of Present Illness  Problem list  Triple negative left breast cancer?  ?   Past medical history:?Hypertension. ?Total hysterectomy in 2005 in [place] for uterine fibroids which were causing excessive vaginal bleeding, requiring blood transfusion. ?Family history of breast cancer and colon cancer. ?  Social history:?. ?3 children. ?Unemployed. ?Says that she is disabled. ?Has been smoking 5-6 cigarettes daily for last 20 years. ?Occasional beer or wine. ?Denies illicit drug abuse.  Family history:?Mother had breast cancer at age 70. ?Mothers niece had breast cancer and colon cancer at unknown age. ?Another niece of her mother had breast cancer at age 50.  Health maintenance:?Has never had screening colonoscopy done.  Menstrual and OB/GYN history:?Menarche at age 12. ?Had 3 pregnancies; first pregnancy at age 15. ?No abortions or miscarriages. ?No history of hormone replacement therapy. ?Status post total hysterectomy in 2005 for uterine fibroids.  ?   Chief complaint:  Follow-up for triple negative left breast cancer  ?  History of present illness:  53-year-old female. ?Referred from [facility] for evaluation and management of invasive ductal carcinoma.??  ?  For details, please see my last note dated 01/03/2020.? Please also refer to assessment and plan section where information is summarized.  ?  Interval History?  ?   10/31/2018:  Presents for initial oncology consultation after moving to [place]. ?Last followed up with her oncologist at [facility] on 09/18/2018. ?Apparently, when she developed left supraclavicular lymph node metastasis, she was told that she has developed stage IV disease, and was palliatively treated with every 3 week carboplatin. ?Surgical  consultation was apparently never obtained. ?On todays visit, she says that overall, she is doing quite well, and rates her general health is 9 on a scale of 1-10, if 10 could be the best. ?Main complaints are weight fluctuations, night sweats, lump in the neck, persistent lump in the left breast which, according to her, never shunt with neoadjuvant chemotherapy, cough, hemorrhoids, and anxiety. ?Reports pain all over the body, 9 on a scale of 1-10, if 10 could be the worst. ?She is asking for refill of her narcotics (oxycodone) as well as Xanax. ?Denies pain in the breast or nipple discharge. ?Appetite is good. ?No unintentional weight loss. ?No unusual headaches or focal neurological symptoms. ?No vision impairment. ?No chest pain or dyspnea. ?No fevers or chills. ?No abdominal pain, nausea, or vomiting. ?No blood in stool. ?No hematemesis, melena, or hematochezia. ?No dysphagia or odynophagia. ?No anorectal pain. ?No abnormal vaginal bleeding or discharge. ?No urinary problems.  ?   09/11/2020:  -Third cycle of gemcitabine completed 07/20/2020  -Fourth cycle, due on 08/05/2020, was held because she reported cough, sore throat  -No showed on 08/12/2020 for labs, provider visit, and chemotherapy  -COVID test negative (08/18/2020)  -09/10/2020: Restaging CT C/A/P with contrast (comparison: 06/29/2020): No metastatic disease in chest, abdomen, or pelvis  -09/10/2020: Restaging CT studies of the neck with contrast (comparison: 06/29/2020): Stable left supraclavicular cutaneous/subcutaneous lesion (peripherally enhancing central low attenuation lesion unchanged in size or morphology with persistent mild surrounding stranding; symmetrical soft tissue fullness in the left ventricular), sinus which is more conspicuous than previous exam; also, soft tissue prominence of larynx without laterality; no newly enlarged lymph nodes)  Presents for follow-up visit.? No new symptoms.? Malignant?cervical lymphadenopathy on the left  side is ulcerated, forming a fungating tumor?in your neck; no bleeding or discharge.? She applies antibiotic ointment to it. ?No fevers or chills. ?No unusual headaches or focal?neurological symptoms.? Also, pain along the medial?aspect of left upper extremity, without weakness.? Also, pain over the?left shoulder area persists.? No chest pain, cough, or dyspnea. ?Appetite is fair.? ECOG 1. ?No abdominal pain, nausea, or vomiting.? Overdue for cycle #4 of gemcitabine.  ?  ?  Review of Systems?  ?????12 point review of systems done in full with pertinent positives as described in interval history.?  ?   ??  Physical Examination:???  General: ?Alert and oriented, No acute distress. ?  ?????Eye: ?Pupils are equal, round and reactive to light, Extraocular movements are intact, Normal conjunctiva. ?  ?????HENT: ?Normocephalic, Oral mucosa is moist, No pharyngeal erythema, No sinus tenderness. ?  ?????Neck: ?Supple, Non-tender, No jugular venous distention, No thyromegaly,?10/31/2018:  ?????A couple of small, subcentimeter, firm lymph nodes are palpable in the left supraclavicular area, nontender.  ?????No palpable lymphadenopathy in axillary or infraclavicular areas.. ?  ?????Respiratory: ?Lungs are clear to auscultation, Respirations are non-labored, Breath sounds are equal, Symmetrical chest wall expansion, No chest wall tenderness. ?  ?????Cardiovascular: ?Normal rate, Regular rhythm, No murmur, No gallop. ?  ?????Breast: ?10/31/2018:  ?????Bilateral breast examination was performed with her verbal consent, in the presence of Prenella, our LPN.  ?????In the left supraclavicular area, a couple of small, subcentimeter, nontender lymph nodes are palpable, firm in consistency.  ?????Left breast: About 3 cm nontender mobile mass is palpable at 2 oclock position in the left breast, about 10-12 cm from the nipple. ?No skin changes. ?No skin thickening. ?No ulceration. ?No nipple discharge.  ?????Right breast: Free from any  suspicious lesions or lymphadenopathy.. ?  ?????Gastrointestinal: ?Soft, Non-tender, Non-distended, No organomegaly. ?  ?????Genitourinary: ?No costovertebral angle tenderness, No inguinal tenderness. ?  ?????Lymphatics: ?No lymphadenopathy neck, axilla, groin. ?  ?????Neurologic: ?Alert, Oriented, Normal sensory, Normal motor function, No focal deficits. ?  ?????Cognition and Speech: ?Oriented, Speech clear and coherent, Functional cognition intact. ?  ?????Psychiatric: ?Cooperative, Appropriate mood & affect, Normal judgment, Non-suicidal.  06/25/2019:?Enlarging left supraclavicular lymphadenopathy, exquisitely tender;?exquisite tenderness to lower part?of cervical spine/upper part of thoracic spine;?neurologically, intact, without objective sensory or motor impairment;?no gait impairment; bilateral breast examination performed with her verbal consent,?in the presence of her daughter and Prenella, LPN;?no suspicious?lesion in either breast; no palpable lymphadenopathy in axillary, supraclavicular,?infraclavicular, or cervical areas on either side.  07/16/2019:?Exquisitely tender?left lower cervical lymphadenopathy.  08/29/2019:?Left supraclavicular lymph node continues to enlarge; now,?overlying skin is?tense and shiny;?no ulceration at this time?but?ulceration seems imminent; exquisitely tender.  11/20/2019:?She did not let me examine left supraclavicular and left axillary area, saying that the lymph nodes remain extremely tender  04/16/2020:?Miserable from peripheral neuropathy and left neck pain;?severe tenderness in the left supraclavicular area?with diffuse, poorly defined?firm fullness;?soft tissue exophytic?growth is noted, without ulceration or bleeding;?severe tenderness in the left axilla;?range of motion?at the left shoulder is very limited;?breast examination was performed with?her verbal consent, in the presence of Prenella, LPN;?left breast is enlarged, with diffuse orange peel appearance of skin  with some erythema, without skin ulceration/satellite nodules/nipple retraction/nipple discharge, strongly suspicious for inflammatory?carcinoma of left breast.? Right breast appears normal.  06/04/2020:?Left breast area examined with her verbal consent, in the presence of Prenella, LPN;?pain and tenderness over left shoulder area?is so severe that she?does not?let me examine, as usual.? Cancer is protruding through the skin at the base of the neck on the left side, without any bleeding.? Orange peel appearance of the skin of the left breast is noted, without erythema.  09/11/2020:?Ulcerated, fungating?malignant lymph node mass at the base of the neck, without bleeding?or discharge  ?   ???  Assessment:  # High-grade triple negative left breast cancer,?axillary lymph nodes positive, diagnosed 08/2017  Neoadjuvant DDAC x4, then dose dense Taxol x4 (10/26/2017-02/27/2018)  ?   >>>  Progression?while on neoadjuvant Taxol:  Developed left supraclavicular malignant lymphadenopathy (biopsy 02/09/2018)  Treated with palliative carboplatin AUC 6 q3 weeks x 9 (03/2018-09/2018)?at outside facility  Left breast mass, 3 cm by palpation, never?shrunk with chemotherapy  No distant metastasis?(PET/CT: 11/2018)  Left breast lumpectomy, SLN biopsy (01/09/2019)  -->ypT2 pN0(sn), triple negative  PD-L1 tumor proportion >?1% (expressed)  She declined adjuvant radiation therapy and adjuvant Capecitabine  Noncompliant with follow-up  ?   >>>  Progression:  Disease relapse/progression (07/2019) (left axillary, left supraclavicular lymphadenopathy)  Pain and tenderness over lower cervical/upper thoracic spine?(NO metastasis?in spine)  -Status post palliative radiation therapy to painful, tender?malignant lymphadenopathy (09/05/2019-09/27/2019), without significant decrease in size of left neck mass/lymphadenopathy  -Restaging CTs and bone scan (12/30/2019):?No disease progression;?interval response to radiation therapy?with left  supraclavicular necrotic lymph nodes?smaller  -Baseline tumor markers within normal limits (11/20/2019)  -Started Tecentriq/Abraxane 01/13/2020  -Started?third cycle of chemotherapy 03/20/2020  -->Thereafter,?did not present for chemotherapy secondary to severe peripheral neuropathy?(side effect of Abraxane)  -Restaging CT C/A/P?(04/15/2020, status post chemotherapy x3):?Stable left axillary lymph nodes  ?  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:  -04/16/2020:?Appearance of inflammatory carcinoma of left breast;?left breast?enlarging, painful, and tender;?extremely tender,?firm soft tissue fullness/lymphadenopathy left supraclavicular area?and left side of the neck  -->?She has failed Tecentriq/Abraxane  -->?She has also experienced severe peripheral neuropathy?as side effect of Abraxane, grade 4  -Restaging CT?soft tissue of the neck (04/24/2020):?Enlargement of left supraclavicular necrotic lymph node;?worsening of generalized?degenerative edema throughout the left supraclavicular, pectoral,?axillary region; enlargement of the left subpectoral?axillary lymph node at the edge of the field-of-view  -Restaging bone scan (04/24/2020):?No bone metastasis  -Gemcitabine started 04/22/2020  Gemcitabine dose decreased by 50%?from?C1?D15?onwards (neutropenia)  -Genetic testing?in 2017 in [place]: variant of uncertain significance (VUS): CDKN2A c.217A>C. This variant is predicted to be benign per in silico analysis (PolyPhen score 0.0).  -04/30/2020:?Core biopsy of?left breast lesion positive for?poorly differentiated carcinoma  -Severe neutropenia (ANC 54 mm?)?05/20/2020;?second cycle of gemcitabine held  -05/20/2020: LVEF?60%?(TTE)  -No showed for chemotherapy on 05/27/2020 and 06/01/2020?  -C2 gemcitabine started 06/08/2020 (was supposed to start 05/20/2020; delayed secondary to neutropenia)  -Gemcitabine dose decreased by 50% from C1 D15 onwards already  -Completed  C2?gemcitabine on 06/22/2020 (C2?D15)  -No worsening metastatic disease, improved left axillary lymphadenopathy?(post gemcitabine x2?cycles)?(restaging CTs?C/A/P/soft tissues of the neck:?06/29/2020)  -Third cycle of gemcitabine completed 07/20/2020  -Fourth cycle delayed (for noncompliance)  -Restaging CT C/A/P post?gemcitabine?x3 (09/10/2020):?No metastasis  -Restaging CT soft of the neck with contrast?post gemcitabine x3 (09/10/2020):?Stable left supraclavicular cutaneous/subcutaneous lesion  ---------  ?  #Sites of disease:  Left breast; left supraclavicular lymph nodes; left axillary lymph nodes; status post left breast lumpectomy; recurrent symptoms in left breast  ---------  ?  #Molecular markers:  -PD-L1 tumor proportion score >1 (expressed)  -BRCA1/2 negative (2017, [place])  -MMR expression preserved  -NTRK gene fusion negative  -------  ?  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 3 times daily  ---------  ?  # Status post surgical menopause (total hysterectomy for uterine fibroids:?2005)  ----------  ?  # Extremely noncompliant with follow-up?in the past  ---------  ?  ?  Plan:  -Extremely noncompliant with follow-ups  -Compliance encouraged  -Proceed with cycle #4 of gemcitabine as soon as possible  -Restage?with contrast-enhanced CTs?C/A/P and softness of the neck?in 2 months (early November)  ?  -On imaging,?stable/improving?metastatic disease post gemcitabine x2  -Stable disease post chemotherapy x3  -Continue gemcitabine?(dose decreased by 50% from?C1 D15 onwards?secondary to neutropenia)  -Clinical suggestion of inflammatory carcinoma of left breast?(biopsy positive for poorly?differentiated carcinoma)?(recurrence)  -Has severe, grade 4 peripheral neuropathy as side effect of Abraxane  -Continue Cymbalta  and gabapentin?for neuropathy  -To the extent possible,?avoid chemotherapy with neuropathy potential (for example, paclitaxel, eribulin, vinorelbine, etc.)  -If disease progresses,?then sacituzumab?will be a reasonable option  -Negative for BRCA1/2; MMR expression preserved; NTRK gene fusion negative; PD-L1?TPS >1 (expressed)  -Continue narcotics for pain  -Check CBC and CMP weekly?during chemotherapy with gemcitabine  ?  Follow-up visit with NP in 2 weeks  ?  Above discussed at length with her. ?All questions answered.? Went over labs and scans.? Gave her copies for her record?she understands and agrees with this plan.  ---------  ?  ?  Discussion:  Chemotherapy schedule:  Gemcitabine 800 to 1200 mg/m? IV days 1, 8, and 15;  Cycled every 28 days  ?  Dose modifications?for myelotoxicity:  -This regimen should not be initiated unless the white blood cell count is >3500 cells/microL and platelets are??100,000/microL  -During therapy, the dose of gemcitabine should be decreased by 25% if the absolute neutrophil count decreases to <1000 cells/microL but??500 cells/microL, or the platelets decrease to <100,000/microL and??50,000/microL  -The [place] Prescribing Information recommends holding gemcitabine for an absolute neutrophil count <500 cells/microL or platelets <50,000/microL.  ?  On Cymbalta 60 mg nightly and gabapentin?400 mg p.o. twice daily for severe peripheral neuropathy.  ?  She has family history of breast cancer.?  Genetic?testing in 2017 in [place]:?Negative for BRCA1/2  ?  Baseline tumor markers were within normal limits. ?Therefore,?will not be helpful for follow-up.  ?  Has been extremely noncompliant with follow-up?in the past.  No follow-up?between December 2018 and June 2019.? Even subsequently, continued to miss/reschedule appointments.  Missed appointment on 10/30/2019?also.  Missed appointment?for restaging CTs, with considerable delay.  When seen on 11/20/2019,?she was supposed to  follow-up with me in 2 weeks?with restaging scans?but she canceled appointment for restaging scans and did not get them done?until 12/30/2019.  Earlier,?after lumpectomy,?she decided not to pursue adjuvant radiation therapy and adjuvant Capecitabine?for persistent?disease?in triple negative setting?post neoadjuvant chemotherapy.  ?  She refused?screening colonoscopy for a long time.? At last, after she noticed some blood in stool,?scheduled it for 9 January.?  Physical Exam  Vitals & Measurements  T:?37? ?C (Oral)? HR:?81(Peripheral)? HR:?81(Monitored)? RR:?18? BP:?136/92? SpO2:?100%?  HT:?160?cm? WT:?70.2?kg? BMI:?27.42?   Problem List/Past Medical History  Ongoing  Axillary lymphadenopathy  Cancer of left breast, stage 4  Cancer-related pain  Chemotherapy-induced peripheral neuropathy  History of noncompliance with medical treatment  Malignant neoplasm of unspecified site of left female breast  Obesity  Supraclavicular lymphadenopathy  Historical  No qualifying data  Procedure/Surgical History  Biopsy or excision of lymph node(s); by needle, superficial (eg, cervical, inguinal, axillary) (04/30/2020)  Biopsy, breast, with placement of breast localization device(s) (eg, clip, metallic pellet), when performed, and imaging of the biopsy specimen, when performed, percutaneous; each additional lesion, including ultrasound guidance (List separately in additi (04/30/2020)  Biopsy, breast, with placement of breast localization device(s) (eg, clip, metallic pellet), when performed, and imaging of the biopsy specimen, when performed, percutaneous; first lesion, including ultrasound guidance (04/30/2020)  Biopsy Sentinal Node (None) (01/09/2019)  Excision of Left Breast, Open Approach (01/09/2019)  Lumpectomy (None) (01/09/2019)  Mastectomy, partial (eg, lumpectomy, tylectomy, quadrantectomy, segmentectomy); with axillary lymphadenectomy (01/09/2019)  Resection of Left Axillary Lymphatic, Open Approach  (01/09/2019)  Hysterectomy (01/01/2005)  BTL  port   Medications  alPRAzolam 1 mg oral tablet, 1 mg= 1 tab(s), Oral, BID  amlodipine 5 mg oral tablet, 5 mg= 1 tab(s), Oral, Now  atorvastatin 10 mg oral tablet, 10 mg= 1 tab(s), Oral, Daily  Cymbalta 60 mg oral delayed release capsule, 60 mg= 1 cap(s), Oral, At Bedtime, 1 refills  fentaNYL 25 mcg/hr transdermal film, extended release, 1 patch(es), TOP, q72hr  gabapentin 600 mg oral tablet, 600 mg= 1 tab(s), Oral, TID, 2 refills  Heparin Flush 100 U/mL - 5 mL, 500 units= 5 mL, IV Push, Once-chemo  lisinopril 40 mg oral tablet, 40 mg= 1 tab(s), Oral, Daily  megestrol 40 mg/mL oral suspension, 800 mg= 20 mL, Oral, Daily, 1 refills  MS Contin 30 mg oral tablet, extended release, 30 mg= 1 tab(s), Oral, q12hr  Norco 10 mg-325 mg oral tablet, 1 tab(s), Oral, q4hr, PRN  Norco 10 mg-325 mg oral tablet, 1 tab(s), Oral, q4hr, PRN  oxyCODONE 20 mg oral tablet, 20 mg= 1 tab(s), Oral, q4hr, PRN,? ?Still taking, not as prescribed  oxyCODONE 30 mg oral tablet, 30 mg= 1 tab(s), Oral, q6hr, PRN  Phenergan 12.5 mg Tab, 12.5 mg= 1 tab(s), Oral, q4hr, PRN  Zofran (for IVPB) 16 mg + dexamethasone 10 mg/mL injectable solution 10 mg  Allergies  No Known Allergies  Social History  Abuse/Neglect  No, 07/20/2020  Alcohol  Current, 09/22/2019  Current, Beer, Wine, 1-2 times per month, Alcohol use interferes with work or home: No. Drinks more than intended: No. Others hurt by drinking: No. Household alcohol concerns: No., 11/08/2018  Employment/School  medical disability, Work/School description: housekeeping. Previous employment/school: 11th grade. Activity level: Moderate physical work., 10/31/2018  Home/Environment  Lives with living with sister/ some days sleeps in her car or in a hotel. Living situation: Homeless/Shelter. Alcohol abuse in household: No. Substance abuse in household: No. Smoker in household: No. Family/Friends available for support: Yes. Concern for family members at home:  No. Major illness in household: No. Financial concerns: Yes. TV/Computer concerns: No., 10/31/2018  Nutrition/Health  Type of diet: homeless/ occasional meals given by sister. Regular, Wants to lose weight: No., 12/04/2018  Sexual  Gender Identity Identifies as female., 07/16/2019  Substance Use  Current, Marijuana, Daily, 07/20/2020  Never, 01/02/2019  Tobacco  4 or less cigarettes(less than 1/4 pack)/day in last 30 days, No, Cigarettes, 4 per day., 07/20/2020  Family History  Cardiac arrhythmia.: Father.  Congestive heart disease.: Mother.  Diabetes mellitus type 2: Mother.  Hyperlipidemia.: Mother.  Hypertension.: Mother and Father.  Primary malignant neoplasm of breast: Mother.

## 2022-05-01 NOTE — HISTORICAL [facility] CERNER
This is a historical note converted from [facility]. Formatting and pictures may have been removed.  Please reference [facility] for original formatting and attached multimedia. History of Present Illness  Problem list  Triple negative left breast cancer?  ?   Past medical history:?Hypertension. ?Total hysterectomy in 2005 in [place] for uterine fibroids which were causing excessive vaginal bleeding, requiring blood transfusion. ?Family history of breast cancer and colon cancer. ?  Social history:?. ?3 children. ?Unemployed. ?Says that she is disabled. ?Has been smoking 5-6 cigarettes daily for last 20 years. ?Occasional beer or wine. ?Denies illicit drug abuse.  Family history:?Mother had breast cancer at age 70. ?Mothers niece had breast cancer and colon cancer at unknown age. ?Another niece of her mother had breast cancer at age 50.  Health maintenance:?Has never had screening colonoscopy done.  Menstrual and OB/GYN history:?Menarche at age 12. ?Had 3 pregnancies; first pregnancy at age 15. ?No abortions or miscarriages. ?No history of hormone replacement therapy. ?Status post total hysterectomy in 2005 for uterine fibroids.  ?   Chief complaint:  Follow-up for triple negative left breast cancer  ?  History of present illness:  53-year-old female. ?Referred from [facility] for evaluation and management of invasive ductal carcinoma.??  ?  # Poorly differentiated triple negative invasive ductal carcinoma of left breast, 2 oclock position, with axillary and supraclavicular?lymph node?involvement. ?Ultrasound-guided left breast biopsy and left axillary lymph node biopsy positive on 8/28/2017.  -(Neoadjuvant?in intent)?dose dense AC?x4 (10/26/2017-12/27/2017), then dose dense Taxol x4 (01/11/2018-02/27/2018).  -Developed?metastasis to left supraclavicular lymph nodes, status post ultrasound-guided biopsy 02/09/2018.  -With the assumption of stage IV disease?based upon  supraclavicular?lymph node metastasis?(whereas actually it was stage IIIC disease),?treated with palliative carboplatin AUC 6 every 3 weeks, started 03/19/2018, ninth cycle on 09/18/2018,?without?surgical evaluation for resection?(treated?at?outside hospital).  -Restaging scans 09/12/2018 with decrease in size of left supraclavicular malignant lymph node.  -11/15/2018: PET/CT: No distant metastasis?????????  To summarize/clarify:  -High-grade triple negative left breast cancer, with biopsy-proven left axillary lymph venu metastasis, diagnosed in 08/2017 in this 53-year-old lady who is status post surgical menopause. ?  -Status post neoadjuvant dose dense AC x4 (10/26/2017-12/27/2017), then dose dense paclitaxel x4 (01/11/2018-02/27/2018). ?  -Developed left supraclavicular lymph node metastasis, status post ultrasound-guided lymph node biopsy 02/2018, then?  -labeled as stage IV (for?what actually?appears to have been stage IIIC disease)?and  -treated with palliative carboplatin?every 3 week starting 03/19/2018, status post ninth cycle 09/18/2018. ?  -With assumption of stage IV disease, apparently,?never evaluated for?resection.??  -Staging CT scans of C/A/P/soft?tissues of the neck on 09/12/2018 showed left supraclavicular lymph node 6 mm, down from 1.1 cm initially, as well as a cluster of smaller supraclavicular lymph nodes with equivocal change. ?  -The left breast mass, 3 cm by palpation, apparently never shrunk with chemotherapy, according to the patient.  -Excellent performance status, ECOG 0.  -Status post ninth cycle of every 3 week carboplatin, 09/18/2018  -No distant metastases on PET/CT 11/15/2018  -Per patient,?genetic evaluation?in the past negative for any inherited high risk mutations  -12/04/2018: Bilateral diagnostic mammogram: Lobulated left upper outer quadrant mass corresponding to palpable abnormality measuring 2.8 cm.  -01/09/2019: Left breast lumpectomy; level 1 axillary lymph node  dissection.  Pathology: Invasive carcinoma with medullary features; 2.8 cm; grade 3, poorly differentiated; triple negative;?no DCIS; margins negative, distance from closest margin <1 mm; number of lymph nodes examined, 1; number of sentinel lymph nodes examined, 1; number of lymph nodes involved by tumor, 0  --> ypT2 pN0(sn), triple negative  -PD-L1 tumor proportion >1 (expressed)  -She decided?not to pursue?adjuvant?radiation therapy and adjuvant Capecitabine.  -06/25/2019:?Presents for follow-up 6 months after last visit?with worsening pain and tenderness?over lower cervical/upper thoracic spine?and?enlarging left supraclavicular lymphadenopathy for 2 weeks.  -07/10/2019: Restaging bone scan: No bone metastasis  -07/09/2019: Restaging CTs C/A/P with contrast: Several enlarged left axillary and lower cervical lymph nodes, centrally hypodense, suggestive of necrosis; no distant metastasis  -07/09/2019: CT soft tissue of the neck with contrast: Left supraclavicular lymphadenopathy with a decreased central density suggesting necrosis, larger since prior PET/CT dated 11/15/2018; 1.5 cm x 2.4 cm; 1.8 cm x 1.8 cm  -07/09/2019: MRI cervical spine with and without contrast: Abnormal signal at the inferior half of C6 and superior C7 endplate with associated bone marrow edema and enhancement (primary differential is asymmetric degenerative disc changes; sclerotic metastasis is within the differential but less likely given the location and enhancement pattern); left lateral recess narrowing at C5-C6 secondary to disc protrusion  -06/25/2019: CMP unremarkable.? CBC unremarkable.  -->  07/2019:?Disease relapse/progression:?Left axillary?and left supraclavicular lymphadenopathy; no distant metastasis; no bone metastasis;?pain and tenderness over lower cervical/upper thoracic spine is not from metastasis  -Status post palliative radiation therapy to left neck and axilla (malignant lymphadenopathy) (09/05/2019-09/27/2019),  without significant decrease in size of left neck mass/lymphadenopathy  -12/30/2019: Restaging CT C/A/P with contrast: Several new areas of left upper lobe consolidation likely reflect postradiation change: Several left axillary/subpectoral lymph nodes are mildly smaller since July 2019; no new or worsening sites of metastatic disease  -12/30/2018: Restaging CT soft tissue of the neck with contrast: Interval response to therapy with decrease in size of the necrotic lymph nodes within the left supraclavicular region as compared with 07/09/2019 CT  -12/30/2019: Restaging whole-body nuclear medicine bone scan: Equivocal tiny sclerotic metastasis to xiphoid process of the sternum  -11/20/2019: CEA, CA 27.29, and CA 15-3 levels normal  ?  ?  # Extremely noncompliant with follow-up.  Did not follow-up with us?between December 2018 and June 2019.  -Never called to make appointment with radiation oncology for adjuvant radiation therapy.  -Never presented to us?for adjuvant chemotherapy with Capecitabine?old left breast lumpectomy, with residual tumor?in triple negative setting  ?  Interval History?  ?   10/31/2018:  Presents for initial oncology consultation after moving to [place]. ?Last followed up with her oncologist at [facility] on 09/18/2018. ?Apparently, when she developed left supraclavicular lymph node metastasis, she was told that she has developed stage IV disease, and was palliatively treated with every 3 week carboplatin. ?Surgical consultation was apparently never obtained. ?On todays visit, she says that overall, she is doing quite well, and rates her general health is 9 on a scale of 1-10, if 10 could be the best. ?Main complaints are weight fluctuations, night sweats, lump in the neck, persistent lump in the left breast which, according to her, never shunt with neoadjuvant chemotherapy, cough, hemorrhoids, and anxiety. ?Reports pain all over the body, 9 on a scale of 1-10,  if 10 could be the worst. ?She is asking for refill of her narcotics (oxycodone) as well as Xanax. ?Denies pain in the breast or nipple discharge. ?Appetite is good. ?No unintentional weight loss. ?No unusual headaches or focal neurological symptoms. ?No vision impairment. ?No chest pain or dyspnea. ?No fevers or chills. ?No abdominal pain, nausea, or vomiting. ?No blood in stool. ?No hematemesis, melena, or hematochezia. ?No dysphagia or odynophagia. ?No anorectal pain. ?No abnormal vaginal bleeding or discharge. ?No urinary problems.  ?   11/20/2019:  Has completed palliative radiation therapy?to left neck and axilla (painful, exquisitely tender malignant lymphadenopathy),?without significant?decrease in size?of the mass/lymphadenopathy.  -No showed on 10/30/2019  Presents for follow-up visit, accompanied by 2 sisters.? Says that?lymph nodes in the left supraclavicular area and left axillary area are much smaller?after radiation therapy but continued to be painful and tender. ?Did not let me examine because of severe tenderness.? No fevers or chills. ?No weakness or fatigue. ?No unusual headaches or focal neurological symptoms. ?Good appetite. ?No chest pain, cough, or dyspnea. ?No bone pains. ?No abdominal pain, nausea, vomiting, or GI bleeding.? No urinary problems.  ?   01/03/2020:  -12/30/2019: Restaging CT C/A/P with contrast: Several new areas of left upper lobe consolidation likely reflect postradiation change: Several left axillary/subpectoral lymph nodes are mildly smaller since July 2019; no new or worsening sites of metastatic disease  -12/30/2018: Restaging CT soft tissue of the neck with contrast: Interval response to therapy with decrease in size of the necrotic lymph nodes within the left supraclavicular region as compared with 07/09/2019 CT  -12/30/2019: Restaging whole-body nuclear medicine bone scan: Equivocal tiny sclerotic metastasis to xiphoid process of the sternum  -11/20/2019: CEA, CA  27.29, and CA 15-3 levels normal  Presents for follow-up visit.? Continues to have pain over left shoulder area and left supraclavicular lymph nodes, sites of metastatic disease.? Still has not started chemotherapy. ?Noncompliant with follow-ups. ?No unusual headaches or focal neurological symptoms. ?At last, scheduled her colonoscopy for the ninth of this month?because of some blood in stool. ?No abdominal pain, nausea, or vomiting. ?No chest pains or dyspnea.  ?  ?  Review of Systems?  ?????12 point review of systems done in full with pertinent positives as described in interval history.?  ?   ??  Physical Examination:???  General: ?Alert and oriented, No acute distress. ?  ?????Eye: ?Pupils are equal, round and reactive to light, Extraocular movements are intact, Normal conjunctiva. ?  ?????HENT: ?Normocephalic, Oral mucosa is moist, No pharyngeal erythema, No sinus tenderness. ?  ?????Neck: ?Supple, Non-tender, No jugular venous distention, No thyromegaly,?10/31/2018:  ?????A couple of small, subcentimeter, firm lymph nodes are palpable in the left supraclavicular area, nontender.  ?????No palpable lymphadenopathy in axillary or infraclavicular areas.. ?  ?????Respiratory: ?Lungs are clear to auscultation, Respirations are non-labored, Breath sounds are equal, Symmetrical chest wall expansion, No chest wall tenderness. ?  ?????Cardiovascular: ?Normal rate, Regular rhythm, No murmur, No gallop. ?  ?????Breast: ?10/31/2018:  ?????Bilateral breast examination was performed with her verbal consent, in the presence of Prenella, our LPN.  ?????In the left supraclavicular area, a couple of small, subcentimeter, nontender lymph nodes are palpable, firm in consistency.  ?????Left breast: About 3 cm nontender mobile mass is palpable at 2 oclock position in the left breast, about 10-12 cm from the nipple. ?No skin changes. ?No skin thickening. ?No ulceration. ?No nipple discharge.  ?????Right breast: Free from any  suspicious lesions or lymphadenopathy.. ?  ?????Gastrointestinal: ?Soft, Non-tender, Non-distended, No organomegaly. ?  ?????Genitourinary: ?No costovertebral angle tenderness, No inguinal tenderness. ?  ?????Lymphatics: ?No lymphadenopathy neck, axilla, groin. ?  ?????Neurologic: ?Alert, Oriented, Normal sensory, Normal motor function, No focal deficits. ?  ?????Cognition and Speech: ?Oriented, Speech clear and coherent, Functional cognition intact. ?  ?????Psychiatric: ?Cooperative, Appropriate mood & affect, Normal judgment, Non-suicidal.  06/25/2019:?Enlarging left supraclavicular lymphadenopathy, exquisitely tender;?exquisite tenderness to lower part?of cervical spine/upper part of thoracic spine;?neurologically, intact, without objective sensory or motor impairment;?no gait impairment; bilateral breast examination performed with her verbal consent,?in the presence of her daughter and Prenella, LPN;?no suspicious?lesion in either breast; no palpable lymphadenopathy in axillary, supraclavicular,?infraclavicular, or cervical areas on either side.  07/16/2019:?Exquisitely tender?left lower cervical lymphadenopathy.  08/29/2019:?Left supraclavicular lymph node continues to enlarge; now,?overlying skin is?tense and shiny;?no ulceration at this time?but?ulceration seems imminent; exquisitely tender.  11/20/2019:?She did not let me examine left supraclavicular and left axillary area, saying that the lymph nodes remain extremely tender  ?   ???  Assessment:  To summarize:  # High-grade triple negative left breast cancer,?axillary lymph nodes positive, diagnosed 08/2017  Neoadjuvant DDAC x4, then dose dense Taxol x4 (10/26/2017-02/27/2018)  Developed left supraclavicular malignant lymphadenopathy (biopsy 02/09/2018)  Treated with palliative carboplatin AUC 6 q3 weeks x 9 (03/2018-09/2018)?at outside facility  Left breast mass, 3 cm by palpation, never?shrunk with chemotherapy  No distant metastasis?(PET/CT  11/2018)  Left breast lumpectomy, SLN biopsy (01/09/2019)  -->  ypT2 pN0(sn), triple negative  PD-L1 tumor proportion >1 (expressed)  She declined adjuvant radiation therapy and adjuvant Capecitabine  Noncompliant with follow-up  Disease relapse/progression (07/2019) (left axillary, left supraclavicular lymphadenopathy)  Pain and tenderness over lower cervical/upper thoracic spine?(NO metastasis)  -Status post palliative radiation therapy to painful, tender?malignant lymphadenopathy (09/05/2019-09/27/2019), without significant decrease in size of left neck mass/lymphadenopathy  -Restaging CTs and bone scan (12/30/2019):?No disease progression;?interval response to radiation therapy?with left supraclavicular necrotic lymph nodes?smaller  -Baseline tumor markers within normal limits (11/20/2019)  ?   # Status post surgical menopause (2005, uterine fibroids)  ?   # Extremely noncompliant with follow-up  ?  ?  Plan:  Has completed palliative radiation therapy?to left neck and axilla (painful, exquisitely tender malignant lymphadenopathy)?with?visible?improvement in left supraclavicular and left axillary lymphadenopathy?but the lymph nodes remain extremely tender, precluding palpation.  ?  Now, need to proceed with palliative chemotherapy?(atezolizumab plus Abraxane).  ?  Has been extremely noncompliant with follow-ups.?Has not started chemotherapy as yet.? Said that she wants chemotherapy?continue?but keeps?missing appointments.  ?  Chemotherapy schedule:  1.? Days 1 and 15: Atezolizumab 840 mg IV over 60 minutes, followed by:  2.? Days 1, 8, 15: Abraxane 100 mg/m? IV  -Repeat cycle every 4 weeks  ?  -The combination prolongs progression free survival among patients with metastatic triple negative breast cancer, PDL 1+  -Median progression free survival 7.5 months (versus 5.0 months with placebo plus Abraxane)  -Median overall survival 25.0 months (versus 15.5 months with placebo plus Abraxane)  -Adverse events leading  to discontinuation of either atezolizumab or Abraxane, according and 15.9% patients  ?  (Reference:?Argelia P, Sanjay S, Olamide HS, et al. Atezolizumab and nab-paclitaxel in advanced triple-negative breast cancer. N Engl J Med. 2018;[id number])  ?  No disease progression noted on restaging CTs and bone scan dated?12/30/2019, prior to starting palliative systemic chemotherapy.  ?  No cervical spine metastasis?on imaging studies?(MRI scan equivocal?but bone scan negative).  ?  Has been extremely noncompliant with follow-up.  No follow-up?between December 2018 and June 2019.? Even subsequently, continued to miss/reschedule appointments.  Missed appointment on 10/30/2019?also.  Missed appointment?for restaging CTs, with considerable delay.  When seen on 11/20/2019,?she was supposed to follow-up with me in 2 weeks?with restaging scans?but she canceled appointment for restaging scans and did not get them done?until 12/30/2019.  ?  As before, today, 01/03/2020,?once again clearly told her?that if she does not want chemotherapy,?we are going to stop filling her narcotics and she will have to?get these through palliative care or through her primary care providers.  ?  Earlier,?after lumpectomy,?she decided not to pursue adjuvant radiation therapy and adjuvant Capecitabine?for persistent?disease?in triple negative setting?post neoadjuvant chemotherapy.  ?  She relates screening colonoscopy for a long time.? At last, after he noticed some blood in stool,?now has scheduled it for 9 January.  ?  -She has family history of breast cancer.? Tells me that she has already undergone genetic evaluation at [facility]?and was found to be negative for any?inherited?high risk mutations.  We have been requesting that report from [facility]?for months without any success.  As of 01/03/2020, we still do not have?genetic testing report.  ?  At this time, will arrange for?genetic counseling/testing?with our  .? Results will be important in terms of selection of?systemic therapy agents.  ?  Will fill prescription for 10 pills of Norco.? Will not refill if she continues to?refuse chemotherapy; in that case, she will need to procure narcotics through palliative care services.  ?  Today, 01/03/2020, once again says that she wants to pursue chemotherapy.  We will schedule chemotherapy teachingsession with over nursing staff.  ?  During chemotherapy, check CBC and CMPweekly.  ?  Today, 01/03/2020,?check tumor markers including CEA, CA 27.29 level,?and CA 15-3 level.  ?  Follow-up visit with NP in 2 weeks  ?  Above discussed at length with her. ?All questions answered. ?Went over scans and labs and gave her copies for her record. ?She understands and agrees with this plan.  Physical Exam  Vitals & Measurements  T:?36.9? ?C (Oral)? HR:?77(Peripheral)? RR:?18? BP:?159/92?  HT:?154?cm? WT:?70.3?kg? BMI:?29.64?   Problem List/Past Medical History  Ongoing  Breast mass  Cancer of left female breast  Hyperlipidemia  Hypertension  Obesity  Historical  No qualifying data  Procedure/Surgical History  Biopsy Sentinal Node (None) (01/09/2019)  Excision of Left Breast, Open Approach (01/09/2019)  Lumpectomy (None) (01/09/2019)  Mastectomy, partial (eg, lumpectomy, tylectomy, quadrantectomy, segmentectomy); with axillary lymphadenectomy (01/09/2019)  Resection of Left Axillary Lymphatic, Open Approach (01/09/2019)  Hysterectomy (01/01/2005)  BTL  port   Medications  acetaminophen-hydrocodone 325 mg-10 mg oral tablet, 1 tab(s), Oral, q6hr, PRN,? ?Not taking  ALPRAZOLAM 0.5 MG TABLET, 0.5 mg= 1 tab(s), Oral, Daily,? ?Not taking  alPRAzolam 1 mg oral tablet, 1 mg= 1 tab(s), Oral, BID,? ?Not taking  amlodipine 5 mg oral tablet, 5 mg= 1 tab(s), Oral, Now  amLODIPine 5 mg oral tablet, 5 mg= 1 tab(s), Oral, Daily,? ?Not taking: duplicate  atorvastatin 10 mg oral tablet, 10 mg= 1 tab(s), Oral, Daily  cyclobenzaprine 10 mg oral tablet, 10  mg= 1 tab(s), Oral, BID,? ?Not taking: duplicate  cyproheptadine 4 mg oral tablet, 4 mg= 1 tab(s), Oral, BID, 1 refills,? ?Not taking  lisinopril 40 mg oral tablet, 40 mg= 1 tab(s), Oral, Daily  Megace 40 mg/mL oral suspension, 800 mg= 20 mL, Oral, Daily, 1 refills,? ?Not taking  megestrol 40 mg/mL oral suspension, 800 mg= 20 mL, Oral, Daily, 1 refills  Norco 10 mg-325 mg oral tablet, 1 tab(s), Oral, q6hr, PRN  oxyCODONE 10 mg oral tablet, 10 mg= 1 tab(s), Oral, q4hr, PRN  OXYCODONE HCL 10 MG TABLET, 10 mg= 1 tab(s), Oral, TID,? ?Not taking  traMADol 50 mg oral tablet, 50 mg= 1 tab(s), Oral, q6hr, PRN,? ?Not taking  Allergies  No Known Allergies  Social History  Abuse/Neglect  No, No, Yes, 11/12/2019  No, 09/22/2019  No, 07/23/2019  Alcohol  Current, 09/22/2019  Current, Beer, Wine, 1-2 times per month, Alcohol use interferes with work or home: No. Drinks more than intended: No. Others hurt by drinking: No. Household alcohol concerns: No., 11/08/2018  Employment/School  medical disability, Work/School description: housekeeping. Previous employment/school: 11th grade. Activity level: Moderate physical work., 10/31/2018  Home/Environment  Lives with living with sister/ some days sleeps in her car or in a hotel. Living situation: Homeless/Shelter. Alcohol abuse in household: No. Substance abuse in household: No. Smoker in household: No. Family/Friends available for support: Yes. Concern for family members at home: No. Major illness in household: No. Financial concerns: Yes. TV/Computer concerns: No., 10/31/2018  Nutrition/Health  Type of diet: homeless/ occasional meals given by sister. Regular, Wants to lose weight: No., 12/04/2018  Sexual  Gender Identity Identifies as female., 07/16/2019  Substance Use  Never, 01/02/2019  Tobacco  4 or less cigarettes(less than 1/4 pack)/day in last 30 days, No, Cigarettes, 4 per day. Household tobacco concerns: No., 11/12/2019  10 or more cigarettes (1/2 pack or more)/day in last  30 days, Yes, 09/22/2019  4 or less cigarettes(less than 1/4 pack)/day in last 30 days, Yes, 08/29/2019  Family History  Cardiac arrhythmia.: Father.  Congestive heart disease.: Mother.  Diabetes mellitus type 2: Mother.  Hyperlipidemia.: Mother.  Hypertension.: Mother and Father.  Primary malignant neoplasm of breast: Mother.

## 2022-05-01 NOTE — HISTORICAL [facility] CERNER
This is a historical note converted from [employer]. Formatting and pictures may have been removed.  Please reference [employer] for original formatting and attached multimedia. Chief Complaint  metastatic breast cancer  History of Present Illness  Problem list  1. High Grade Triple Negative Left Breast Cancer  -Malignant left axillary and supraclavicular lymphadenopathy  -Originally diagnosed: 08/2017.  -Negative for BRCA1/2  -MMR expression preserved  -NTRK gene fusion negative  -PD-L1?TPS >1 (expressed)  ?   Current Treatment:  1. Gemzar 1000mg/m2 on days 1, 8, & 15 every 28 days.  Started 4/22/2020  ?   C4D15?due 09/30/2020  C5D1 due 10/14/2020  ?   Treatment History:  1. DDAC x4 cycles 10/26/2017-12/27/2017  2. DD Taxol x4 cycles 01/11/2018-02/27/2018  3. Carboplatin AUC 6 x9 cycles 03/19/2018-09/18/2018  4. Left lumpectomy level 1 ALN dissection: 01/19/2019  5. Palliative Abraxane/Tecentriq cycled every 4 weeks, until disease progression or unacceptable toxicity. Started 1/13/2020. Stopped 4/16/2020 after C3D1 due to progression.  ?   History of present illness:  53-year-old female. ?Referred from [facility] for evaluation and management of invasive ductal carcinoma.??  ?  For details, please see?MD last note dated 01/03/2020.? Please also refer to assessment and plan section where information is summarized.  ?   Past medical history:?Hypertension. ?Total hysterectomy in 2005 in [place] for uterine fibroids which were causing excessive vaginal bleeding, requiring blood transfusion. ?Family history of breast cancer and colon cancer. ?  Social history:?. ?3 children. ?Unemployed. ?Says that she is disabled. ?Has been smoking 5-6 cigarettes daily for last 20 years. ?Occasional beer or wine. ?Denies illicit drug abuse.  Family history:?Mother had breast cancer at age 70. ?Mothers niece had breast cancer and colon cancer at unknown age. ?Another niece of her mother had breast  cancer at age 50.  Health maintenance:?Has never had screening colonoscopy done.  Menstrual and OB/GYN history:?Menarche at age 12. ?Had 3 pregnancies; first pregnancy at age 15. ?No abortions or miscarriages. ?No history of hormone replacement therapy. ?Status post total hysterectomy in 2005 for uterine fibroids.  ?   Interval History  9/30/2020: Patient present for follow up on Gemzar; she is scheduled to receive C4D15 today. BP elevated at 177/96. Patient complains of left arm stabbing pain 10/10. She is aware that this pain and mild edema is related to lymphadenopathy. Advised her to continue her pain medications for this. Electrolytes WNL. BUN/creatinine 11 & 0.60; eGFR WNL. Bili & LFTs WNL. WBC 3.5; H&H 12.1 & 38.6; plts 205k; ANC 1960. She states Phenergan is effective for nausea; stool softeners are effective for constipation. She denies vomiting, diarrhea, mouth sores, and abdominal pain. Peripheral neuropathy has improved. Will have her follow up in 2 weeks with labs prior to C5D1. She is amenable to this plan.  Review of Systems  A complete 12-point?ROS was performed in full with pertinent positives as described in interval history. Remainder of ROS done in full and are negative.  Physical Exam  Vitals & Measurements  T:?36.8? ?C (Oral)? HR:?98(Peripheral)? RR:?20? BP:?177/96?  HT:?154.00?cm? WT:?69.200?kg? BMI:?29.18?  General: ?Alert and oriented, No acute distress.??  Appearance: Well-groomed  HEENT: ?Normocephalic, Oral mucosa is moist.?Pupils are equal, round and reactive to light, Extraocular movements are intact, Normal conjunctiva.?  Neck: ?Supple, Non-tender, No lymphadenopathy, No thyromegaly.??  Respiratory: ?Lungs are clear to auscultation, Respirations are non-labored, Breath sounds are equal, Symmetrical chest wall expansion.??  Cardiovascular: ?Normal rate, Regular rhythm, No edema.??  Breast:??Breast exam not performed on todays visit.??  Gastrointestinal: Rounded,?Soft, Non-tender,  Non-distended, Normal bowel sounds.??  Musculoskeletal: ?Normal strength.??  Integumentary: ?Warm, dry, intact.??  Neurologic: ?Alert, Oriented, No focal deficits, Cranial Nerves II-XII are grossly intact.??  Cognition and Speech: ?Oriented, Speech clear and coherent.??Wearing face mask.  Psychiatric: ?Cooperative, Appropriate mood & affect. ?  ECOG Performance Scale: 2 - Capable of all self-care but unable to carry out any work activities. Up and about greater than 50 percent of waking hours.?  Assessment/Plan  1.?Malignant neoplasm of unspecified site of left female breast?C50.912  # High-grade triple negative left breast cancer,?axillary lymph nodes positive, diagnosed 08/2017  Neoadjuvant DDAC x4, then dose dense Taxol x4 (10/26/2017-02/27/2018)  ?   >>>  Progression?while on neoadjuvant Taxol:  Developed left supraclavicular malignant lymphadenopathy (biopsy 02/09/2018)  Treated with palliative carboplatin AUC 6 q3 weeks x 9 (03/2018-09/2018)?at outside facility  Left breast mass, 3 cm by palpation, never?shrunk with chemotherapy  No distant metastasis?(PET/CT: 11/2018)  Left breast lumpectomy, SLN biopsy (01/09/2019)  -->ypT2 pN0(sn), triple negative  PD-L1 tumor proportion >?1% (expressed)  She declined adjuvant radiation therapy and adjuvant Capecitabine  Noncompliant with follow-up  ?   >>>  Progression:  Disease relapse/progression (07/2019) (left axillary, left supraclavicular lymphadenopathy)  Pain and tenderness over lower cervical/upper thoracic spine?(NO metastasis?in spine)  -Status post palliative radiation therapy to painful, tender?malignant lymphadenopathy (09/05/2019-09/27/2019), without significant decrease in size of left neck mass/lymphadenopathy  -Restaging CTs and bone scan (12/30/2019):?No disease progression;?interval response to radiation therapy?with left supraclavicular necrotic lymph nodes?smaller  -Baseline tumor markers within normal limits (11/20/2019)  -Started Tecentriq/Abraxane  01/13/2020  -Started?third cycle of chemotherapy 03/20/2020  -->Thereafter,?did not present for chemotherapy secondary to severe peripheral neuropathy?(side effect of Abraxane)  -Restaging CT C/A/P?(04/15/2020, status post chemotherapy x3):?Stable left axillary lymph nodes  ?   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:  -04/16/2020:?Appearance of inflammatory carcinoma of left breast;?left breast?enlarging, painful, and tender;?extremely tender,?firm soft tissue fullness/lymphadenopathy left supraclavicular area?and left side of the neck  -->?She has failed Tecentriq/Abraxane  -->?She has also experienced severe peripheral neuropathy?as side effect of Abraxane, grade 4  -Restaging CT?soft tissue of the neck (04/24/2020):?Enlargement of left supraclavicular necrotic lymph node;?worsening of generalized?degenerative edema throughout the left supraclavicular, pectoral,?axillary region; enlargement of the left subpectoral?axillary lymph node at the edge of the field-of-view  -Restaging bone scan (04/24/2020):?No bone metastasis  -Gemcitabine started 04/22/2020  Gemcitabine dose decreased by 50%?from?C1?D15?onwards (neutropenia)  -Genetic testing?in 2017 in [place]: variant of uncertain significance (VUS): CDKN2A c.217A>C. This variant is predicted to be benign per in silico analysis (PolyPhen score 0.0).  -04/30/2020:?Core biopsy of?left breast lesion positive for?poorly differentiated carcinoma  -Severe neutropenia (ANC 54 mm?)?05/20/2020;?second cycle of gemcitabine held  -05/20/2020: LVEF?60%?(TTE)  -No showed for chemotherapy on 05/27/2020 and 06/01/2020?  -C2 gemcitabine started 06/08/2020 (was supposed to start 05/20/2020; delayed secondary to neutropenia)  -Gemcitabine dose decreased by 50% from C1 D15 onwards already  -Completed C2?gemcitabine on 06/22/2020 (C2?D15)  -No worsening metastatic disease, improved left axillary lymphadenopathy?(post gemcitabine  x2?cycles)?(restaging CTs?C/A/P/soft tissues of the neck:?06/29/2020)  -Third cycle of gemcitabine completed 07/20/2020  -Fourth cycle delayed (for noncompliance)  -Restaging CT C/A/P post?gemcitabine?x3 (09/10/2020):?No metastasis  -Restaging CT soft of the neck with contrast?post gemcitabine x3 (09/10/2020):?Stable left supraclavicular cutaneous/subcutaneous lesion  ---------  ?   #Sites of disease:  Left breast; left supraclavicular lymph nodes; left axillary lymph nodes; status post left breast lumpectomy; recurrent symptoms in left breast  ---------  ?   #Molecular markers:  -PD-L1 tumor proportion score >1 (expressed)  -BRCA1/2 negative (2017, [place])  -MMR expression preserved  -NTRK gene fusion negative  -------  ?   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 3 times daily  ---------  ?   # Status post surgical menopause (total hysterectomy for uterine fibroids:?2005)  ----------  ?  # Extremely noncompliant with follow-up?in the past  ---------  ?  ?  Plan:  -On imaging,?stable/improving?metastatic disease post gemcitabine x2  -Stable disease post chemotherapy x3  -Continue gemcitabine?(dose decreased by 50% from?C1 D15 onwards?secondary to neutropenia)  -Clinical suggestion of inflammatory carcinoma of left breast?(biopsy positive for poorly?differentiated carcinoma)?(recurrence)  -Restage?with contrast-enhanced CTs?C/A/P and softness of the neck?in 2 months (early November)  ?  -To the extent possible,?avoid chemotherapy with neuropathy potential (for example, paclitaxel, eribulin, vinorelbine, etc.)  -If disease progresses,?then sacituzumab?will be a reasonable option  -Negative for BRCA1/2; MMR expression preserved; NTRK gene fusion negative; PD-L1?TPS >1 (expressed)  -Continue narcotics for pain  -Check CBC  and CMP weekly?during chemotherapy with gemcitabine  ?  Ok to proceed with C4D15 of Gemzar today.  Follow up in 2 weeks (10/14/2020) with CBC, CMP prior to C5D1.  Restaging CT C/A/P and Neck scheduled for 11/10/2020.?  ?  ?  Discussion:  Chemotherapy schedule:  Gemcitabine 800 to 1200 mg/m? IV days 1, 8, and 15;  Cycled every 28 days  ?  Dose modifications?for myelotoxicity:  -This regimen should not be initiated unless the white blood cell count is >3500 cells/microL and platelets are??100,000/microL  -During therapy, the dose of gemcitabine should be decreased by 25% if the absolute neutrophil count decreases to <1000 cells/microL but??500 cells/microL, or the platelets decrease to <100,000/microL and??50,000/microL  -The [place] Prescribing Information recommends holding gemcitabine for an absolute neutrophil count <500 cells/microL or platelets <50,000/microL.  ?  On Cymbalta 60 mg nightly and gabapentin?400 mg p.o. twice daily for severe peripheral neuropathy.  ?  She has family history of breast cancer.?  Genetic?testing in 2017 in [place]:?Negative for BRCA1/2  ?  Baseline tumor markers were within normal limits. ?Therefore,?will not be helpful for follow-up.  ?  She refused?screening colonoscopy for a long time.? At last, after she noticed some blood in stool,?scheduled it for 9 January.  Ordered:  ?  2.?History of noncompliance with medical treatment?Z91.19  Has been extremely noncompliant with follow-up?in the past.  No follow-up?between December 2018 and June 2019.? Even subsequently, continued to miss/reschedule appointments.  Missed appointment on 10/30/2019?also.  Missed appointment?for restaging CTs, with considerable delay.  When seen on 11/20/2019,?she was supposed to follow-up with me in 2 weeks?with restaging scans?but she canceled appointment for restaging scans and did not get them done?until 12/30/2019.  Earlier,?after lumpectomy,?she decided not to pursue adjuvant radiation therapy  and adjuvant Capecitabine?for persistent?disease?in triple negative setting?post neoadjuvant chemotherapy.  ?   Patient had not presented for follow up or treatment for 2 months.  -Compliance encouraged  -9/11/2020: Proceed with cycle #4 of gemcitabine as soon as possible?  ?  Compliance encouraged.  Ordered:  ?  3.?Chemotherapy-induced peripheral neuropathy?G62.0  -Has severe, grade 4 peripheral neuropathy as side effect of Abraxane  ?   Continue Cymbalta and gabapentin?for neuropathy  Ordered:  ?   Problem List/Past Medical History  Ongoing  Axillary lymphadenopathy  Cancer of left breast, stage 4  Cancer-related pain  Chemotherapy-induced peripheral neuropathy  History of noncompliance with medical treatment  Malignant neoplasm of unspecified site of left female breast  Obesity  Supraclavicular lymphadenopathy  Historical  No qualifying data  Procedure/Surgical History  Biopsy or excision of lymph node(s); by needle, superficial (eg, cervical, inguinal, axillary) (04/30/2020)  Biopsy, breast, with placement of breast localization device(s) (eg, clip, metallic pellet), when performed, and imaging of the biopsy specimen, when performed, percutaneous; each additional lesion, including ultrasound guidance (List separately in additi (04/30/2020)  Biopsy, breast, with placement of breast localization device(s) (eg, clip, metallic pellet), when performed, and imaging of the biopsy specimen, when performed, percutaneous; first lesion, including ultrasound guidance (04/30/2020)  Biopsy Sentinal Node (None) (01/09/2019)  Excision of Left Breast, Open Approach (01/09/2019)  Lumpectomy (None) (01/09/2019)  Mastectomy, partial (eg, lumpectomy, tylectomy, quadrantectomy, segmentectomy); with axillary lymphadenectomy (01/09/2019)  Resection of Left Axillary Lymphatic, Open Approach (01/09/2019)  Hysterectomy (01/01/2005)  BTL  port   Medications  alPRAzolam 1 mg oral tablet, 1 mg= 1 tab(s), Oral, BID  amlodipine 5 mg oral  tablet, 5 mg= 1 tab(s), Oral, Now  atorvastatin 10 mg oral tablet, 10 mg= 1 tab(s), Oral, Daily  Cymbalta 60 mg oral delayed release capsule, 60 mg= 1 cap(s), Oral, At Bedtime, 1 refills  dexamethasone (for IVPB)  fentaNYL 25 mcg/hr transdermal film, extended release, 1 patch(es), TOP, q72hr  gabapentin 600 mg oral tablet, 600 mg= 1 tab(s), Oral, TID, 2 refills  Heparin Flush 100 U/mL - 5 mL, 500 units= 5 mL, IV Push, Once-chemo  lisinopril 40 mg oral tablet, 40 mg= 1 tab(s), Oral, Daily  megestrol 40 mg/mL oral suspension, 800 mg= 20 mL, Oral, Daily, 1 refills  MS Contin 30 mg oral tablet, extended release, 30 mg= 1 tab(s), Oral, q12hr  Norco 10 mg-325 mg oral tablet, 1 tab(s), Oral, q4hr, PRN  Norco 10 mg-325 mg oral tablet, 1 tab(s), Oral, q4hr, PRN  ondansetron (for IVPB)  oxyCODONE 20 mg oral tablet, 20 mg= 1 tab(s), Oral, q4hr, PRN,? ?Still taking, not as prescribed  oxyCODONE 30 mg oral tablet, 30 mg= 1 tab(s), Oral, q6hr, PRN  Phenergan 12.5 mg Tab, 12.5 mg= 1 tab(s), Oral, q4hr, PRN  Zofran (for IVPB) 16 mg + dexamethasone 10 mg/mL injectable solution 10 mg  Allergies  No Known Allergies  Social History  Abuse/Neglect  No, No, Yes, 09/30/2020  Alcohol  Current, Beer, 09/30/2020  Employment/School  medical disability, Work/School description: housekeeping. Previous employment/school: 11th grade. Activity level: Moderate physical work., 10/31/2018  Home/Environment  Lives with living with sister/ some days sleeps in her car or in a hotel. Living situation: Homeless/Shelter. Alcohol abuse in household: No. Substance abuse in household: No. Smoker in household: No. Family/Friends available for support: Yes. Concern for family members at home: No. Major illness in household: No. Financial concerns: Yes. TV/Computer concerns: No., 10/31/2018  Nutrition/Health  Type of diet: homeless/ occasional meals given by sister. Regular, Wants to lose weight: No., 12/04/2018  Sexual  Gender Identity Identifies as female.,  07/16/2019  Substance Use  Current, Marijuana, Daily, 09/30/2020  Tobacco  4 or less cigarettes(less than 1/4 pack)/day in last 30 days, No, Cigarettes, 10 per day., 09/30/2020  Family History  Cardiac arrhythmia.: Father.  Congestive heart disease.: Mother.  Diabetes mellitus type 2: Mother.  Hyperlipidemia.: Mother.  Hypertension.: Mother and Father.  Primary malignant neoplasm of breast: Mother.  Health Maintenance  Health Maintenance  ???Pending?(in the next year)  ??? ??OverDue  ??? ? ? ?Smoking Cessation due??01/01/20??Variable frequency  ??? ? ? ?Alcohol Misuse Screening due??01/02/20??and every 1??year(s)  ??? ? ? ?ADL Screening due??06/25/20??and every 1??year(s)  ??? ??Due?  ??? ? ? ?Colorectal Screening due??09/30/20??and every?  ??? ? ? ?Influenza Vaccine due??09/30/20??and every?  ??? ? ? ?Medicare Annual Wellness Exam due??09/30/20??and every 1??year(s)  ??? ? ? ?Tetanus Vaccine due??09/30/20??and every 10??year(s)  ??? ? ? ?Zoster Vaccine due??09/30/20??and every?  ??? ??Due In Future?  ??? ? ? ?Obesity Screening not due until??01/01/21??and every 1??year(s)  ???Satisfied?(in the past 1 year)  ??? ??Satisfied?  ??? ? ? ?Blood Pressure Screening on??09/30/20.??Satisfied by [place] LPN, Becal S  ??? ? ? ?Body Mass Index Check on??09/30/20.??Satisfied by [place] CHERRIE Becal S  ??? ? ? ?Breast Cancer Screening on??04/30/20.??Satisfied by Alyssa Espinal  ??? ? ? ?Depression Screening on??09/30/20.??Satisfied by [place] CHERRIE Beonka S  ??? ? ? ?Diabetes Screening on??09/30/20.??Satisfied by Sue Bolton  ??? ? ? ?Hypertension Management-Blood Pressure on??09/30/20.??Satisfied by Gabi Valle LPN  ??? ? ? ?Influenza Vaccine on??09/23/20.??Satisfied by José Miguel GREY, Gladis Post  ??? ? ? ?Obesity Screening on??09/30/20.??Satisfied by Gabi Valle LPN  ?  Lab Results  Test Name Test Result Date/Time   Sodium Lvl 138 mmol/L 09/30/2020 10:10 CDT   Potassium Lvl 3.9 mmol/L  09/30/2020 10:10 CDT   Chloride 102 mmol/L 09/30/2020 10:10 CDT   CO2 28 mmol/L 09/30/2020 10:10 CDT   Calcium Lvl 9.9 mg/dL 09/30/2020 10:10 CDT   Glucose Lvl 82 mg/dL 09/30/2020 10:10 CDT   BUN 11 mg/dL 09/30/2020 10:10 CDT   Creatinine 0.60 mg/dL 09/30/2020 10:10 CDT   eGFR-AA >105 mL/min 09/30/2020 10:10 CDT   eGFR-NAY >105 mL/min 09/30/2020 10:10 CDT   Bili Total 0.4 mg/dL 09/30/2020 10:10 CDT   Bili Direct 0.1 mg/dL 09/30/2020 10:10 CDT   Bili Indirect 0.3 mg/dL 09/30/2020 10:10 CDT   AST 30 unit/L 09/30/2020 10:10 CDT   ALT 62 unit/L 09/30/2020 10:10 CDT   Alk Phos 96 unit/L 09/30/2020 10:10 CDT   Total Protein 8.1 gm/dL 09/30/2020 10:10 CDT   Albumin Lvl 3.7 gm/dL 09/30/2020 10:10 CDT   Globulin 4.40 gm/mL (High) 09/30/2020 10:10 CDT   A/G Ratio 0.8 ratio (Low) 09/30/2020 10:10 CDT   WBC 3.5 x10(3)/mcL (Low) 09/30/2020 10:10 CDT   RBC 4.45 x10(6)/mcL 09/30/2020 10:10 CDT   Hgb 12.1 gm/dL 09/30/2020 10:10 CDT   Hct 38.6 % 09/30/2020 10:10 CDT   Platelet 205 x10(3)/mcL 09/30/2020 10:10 CDT   MCV 86.7 fL 09/30/2020 10:10 CDT   MCH 27.2 pg 09/30/2020 10:10 CDT   MCHC 31.3 gm/dL 09/30/2020 10:10 CDT   RDW 18.0 % (High) 09/30/2020 10:10 CDT   MPV 7.8 fL 09/30/2020 10:10 CDT   Abs Neut 1.96 x10(3)/mcL (Low) 09/30/2020 10:10 CDT   Neutro Auto 56 % 09/30/2020 10:10 CDT   Lymph Auto 30 % 09/30/2020 10:10 CDT   Mono Auto 11 % 09/30/2020 10:10 CDT   Eos Auto 1 % 09/30/2020 10:10 CDT   Abs Eos 0.0 x10(3)/mcL 09/30/2020 10:10 CDT   Basophil Auto 0 % 09/30/2020 10:10 CDT   Abs Neutro 1.96 x10(3)/mcL (Low) 09/30/2020 10:10 CDT   Abs Lymph 1.1 x10(3)/mcL 09/30/2020 10:10 CDT   Abs Mono 0.4 x10(3)/mcL 09/30/2020 10:10 CDT   Abs Baso 0.0 x10(3)/mcL 09/30/2020 10:10 CDT   IG% 1 % 09/30/2020 10:10 CDT   IG# 0.0300 09/30/2020 10:10 CDT

## 2022-05-01 NOTE — HISTORICAL [facility] CERNER
This is a historical note converted from [facility]. Formatting and pictures may have been removed.  Please reference [facility] for original formatting and attached multimedia. History of Present Illness  Problem list  Triple negative left breast cancer?  ?   Past medical history:?Hypertension. ?Total hysterectomy in 2005 in [place] for uterine fibroids which were causing excessive vaginal bleeding, requiring blood transfusion. ?Family history of breast cancer and colon cancer. ?  Social history:?. ?3 children. ?Unemployed. ?Says that she is disabled. ?Has been smoking 5-6 cigarettes daily for last 20 years. ?Occasional beer or wine. ?Denies illicit drug abuse.  Family history:?Mother had breast cancer at age 70. ?Mothers niece had breast cancer and colon cancer at unknown age. ?Another niece of her mother had breast cancer at age 50.  Health maintenance:?Has never had screening colonoscopy done.  Menstrual and OB/GYN history:?Menarche at age 12. ?Had 3 pregnancies; first pregnancy at age 15. ?No abortions or miscarriages. ?No history of hormone replacement therapy. ?Status post total hysterectomy in 2005 for uterine fibroids.  ?   Chief complaint:  Follow-up for triple negative left breast cancer  ?  History of present illness:  53-year-old female. ?Referred from [facility] for evaluation and management of invasive ductal carcinoma.??  ?  For details, please see my last note dated 01/03/2020.? Please also refer to assessment and plan section where information is summarized.  ?  Interval History?  ?   10/31/2018:  Presents for initial oncology consultation after moving to [place]. ?Last followed up with her oncologist at [facility] on 09/18/2018. ?Apparently, when she developed left supraclavicular lymph node metastasis, she was told that she has developed stage IV disease, and was palliatively treated with every 3 week carboplatin. ?Surgical  consultation was apparently never obtained. ?On todays visit, she says that overall, she is doing quite well, and rates her general health is 9 on a scale of 1-10, if 10 could be the best. ?Main complaints are weight fluctuations, night sweats, lump in the neck, persistent lump in the left breast which, according to her, never shunt with neoadjuvant chemotherapy, cough, hemorrhoids, and anxiety. ?Reports pain all over the body, 9 on a scale of 1-10, if 10 could be the worst. ?She is asking for refill of her narcotics (oxycodone) as well as Xanax. ?Denies pain in the breast or nipple discharge. ?Appetite is good. ?No unintentional weight loss. ?No unusual headaches or focal neurological symptoms. ?No vision impairment. ?No chest pain or dyspnea. ?No fevers or chills. ?No abdominal pain, nausea, or vomiting. ?No blood in stool. ?No hematemesis, melena, or hematochezia. ?No dysphagia or odynophagia. ?No anorectal pain. ?No abnormal vaginal bleeding or discharge. ?No urinary problems.  ?   04/28/2020:  -Gemcitabine started 04/22/2020  -04/24/2020: Restaging CT soft tissue of the neck with contrast (comparison: 12/30/2019): Interval enlargement of the left supraclavicular necrotic lymph node (1.7 x 2.3 cm, previously 1.4 x 1.6 cm), worsening of generalized degenerative edema throughout the left supraclavicular, pectoral, axillary region, enlargement of a left subpectoral axillary lymph node at the edge of the field-of-view (up to 1 cm); adjacent subpleural scarring noted within the left upper lobe of the lung  -04/24/2020: Staging bone scan (comparison: 12/30/2019): No bone metastasis  -04/15/2020: CA-15-3 and CA 27.29 levels within normal limits  -Left breast biopsy is scheduled for 04/30/2020  -Genetic testing in 2017 in [place]:?variant of uncertain significance (VUS): CDKN2A c.217A>C. This variant is predicted to be benign per in silico analysis (PolyPhen score 0.0).  -04/28/2020: CMP more or less within  acceptable limits.? WBC 2.6.? ANC 1.75.? Hemoglobin 11 g/dL.? Platelets normal.  Presents for follow-up visit. ?No particular side effects?with first dose of gemcitabine on 04/22/2020.? Today, mild neutropenia, clinically not significant.? Reports night sweats, hot flashes, cough, constipation, numbness and tingling in hands?(Abraxane induced neuropathy;?reasonably controlled with Cymbalta and gabapentin), and anxiety, without psychotic symptoms.? Left breast remains enlarged, with constant pain, 9/10?(strong suspicion of inflammatory?breast cancer).? No unusual headaches, loss of consciousness, seizures, strokelike symptoms.? Fair appetite. ?No chest pain or dyspnea.? The extracutaneous extension of left supraclavicular malignant lymphadenopathy is bleeding;?she has it covered with a bandage.  ?  ?  Review of Systems?  ?????12 point review of systems done in full with pertinent positives as described in interval history.?  ?   ??  Physical Examination:???  General: ?Alert and oriented, No acute distress. ?  ?????Eye: ?Pupils are equal, round and reactive to light, Extraocular movements are intact, Normal conjunctiva. ?  ?????HENT: ?Normocephalic, Oral mucosa is moist, No pharyngeal erythema, No sinus tenderness. ?  ?????Neck: ?Supple, Non-tender, No jugular venous distention, No thyromegaly,?10/31/2018:  ?????A couple of small, subcentimeter, firm lymph nodes are palpable in the left supraclavicular area, nontender.  ?????No palpable lymphadenopathy in axillary or infraclavicular areas.. ?  ?????Respiratory: ?Lungs are clear to auscultation, Respirations are non-labored, Breath sounds are equal, Symmetrical chest wall expansion, No chest wall tenderness. ?  ?????Cardiovascular: ?Normal rate, Regular rhythm, No murmur, No gallop. ?  ?????Breast: ?10/31/2018:  ?????Bilateral breast examination was performed with her verbal consent, in the presence of Prenella, our LPN.  ?????In the left supraclavicular area, a couple  of small, subcentimeter, nontender lymph nodes are palpable, firm in consistency.  ?????Left breast: About 3 cm nontender mobile mass is palpable at 2 oclock position in the left breast, about 10-12 cm from the nipple. ?No skin changes. ?No skin thickening. ?No ulceration. ?No nipple discharge.  ?????Right breast: Free from any suspicious lesions or lymphadenopathy.. ?  ?????Gastrointestinal: ?Soft, Non-tender, Non-distended, No organomegaly. ?  ?????Genitourinary: ?No costovertebral angle tenderness, No inguinal tenderness. ?  ?????Lymphatics: ?No lymphadenopathy neck, axilla, groin. ?  ?????Neurologic: ?Alert, Oriented, Normal sensory, Normal motor function, No focal deficits. ?  ?????Cognition and Speech: ?Oriented, Speech clear and coherent, Functional cognition intact. ?  ?????Psychiatric: ?Cooperative, Appropriate mood & affect, Normal judgment, Non-suicidal.  06/25/2019:?Enlarging left supraclavicular lymphadenopathy, exquisitely tender;?exquisite tenderness to lower part?of cervical spine/upper part of thoracic spine;?neurologically, intact, without objective sensory or motor impairment;?no gait impairment; bilateral breast examination performed with her verbal consent,?in the presence of her daughter and Prenella, LPN;?no suspicious?lesion in either breast; no palpable lymphadenopathy in axillary, supraclavicular,?infraclavicular, or cervical areas on either side.  07/16/2019:?Exquisitely tender?left lower cervical lymphadenopathy.  08/29/2019:?Left supraclavicular lymph node continues to enlarge; now,?overlying skin is?tense and shiny;?no ulceration at this time?but?ulceration seems imminent; exquisitely tender.  11/20/2019:?She did not let me examine left supraclavicular and left axillary area, saying that the lymph nodes remain extremely tender  04/16/2020:?Miserable from peripheral neuropathy and left neck pain;?severe tenderness in the left supraclavicular area?with diffuse, poorly defined?firm  fullness;?soft tissue exophytic?growth is noted, without ulceration or bleeding;?severe tenderness in the left axilla;?range of motion?at the left shoulder is very limited;?breast examination was performed with?her verbal consent, in the presence of renal, LPN;?left breast is enlarged, with diffuse orange peel appearance of skin with some erythema, without skin ulceration/satellite nodules/nipple retraction/nipple discharge, strongly suspicious for inflammatory?carcinoma of left breast.? Right breast appears normal.  ?   ???  Assessment:  To summarize:  # High-grade triple negative left breast cancer,?axillary lymph nodes positive, diagnosed 08/2017  Neoadjuvant DDAC x4, then dose dense Taxol x4 (10/26/2017-02/27/2018)  Developed left supraclavicular malignant lymphadenopathy (biopsy 02/09/2018)  Treated with palliative carboplatin AUC 6 q3 weeks x 9 (03/2018-09/2018)?at outside facility  Left breast mass, 3 cm by palpation, never?shrunk with chemotherapy  No distant metastasis?(PET/CT: 11/2018)  Left breast lumpectomy, SLN biopsy (01/09/2019)  -->  ypT2 pN0(sn), triple negative  PD-L1 tumor proportion >1 (expressed)  She declined adjuvant radiation therapy and adjuvant Capecitabine  Noncompliant with follow-up  >>  Disease relapse/progression (07/2019) (left axillary, left supraclavicular lymphadenopathy)  Pain and tenderness over lower cervical/upper thoracic spine?(NO metastasis?in spine)  -Status post palliative radiation therapy to painful, tender?malignant lymphadenopathy (09/05/2019-09/27/2019), without significant decrease in size of left neck mass/lymphadenopathy  -Restaging CTs and bone scan (12/30/2019):?No disease progression;?interval response to radiation therapy?with left supraclavicular necrotic lymph nodes?smaller  -Baseline tumor markers within normal limits (11/20/2019)  -Started Tecentriq/Abraxane 01/13/2020  -Started?third cycle of chemotherapy 03/20/2020  -->Thereafter,?did not present for  chemotherapy secondary to severe peripheral neuropathy?(side effect of Abraxane)  -Restaging CT C/A/P?(04/15/2020):?Stable left axillary lymph nodes  (CT soft tissue of the neck pending)  -04/16/2020:?Appearance of inflammatory carcinoma of left breast;?left breast?enlarging, painful, and tender;?extremely tender,?firm soft tissue fullness/lymphadenopathy left supraclavicular area?and left side of the neck  -->?She has failed Tecentriq/Abraxane  -->?She has also experienced severe peripheral neuropathy?as side effect of Abraxane, grade 4  -Restaging CT?soft tissue of the neck (04/24/2020):?Enlargement of left supraclavicular necrotic lymph node;?worsening of generalized?degenerative edema throughout the left supraclavicular, pectoral,?axillary region; enlargement of the left subpectoral?axillary lymph node at the edge of the field-of-view  -Restaging bone scan (04/24/2020):?No bone metastasis  >>  -Gemcitabine started 04/22/2020  -Genetic testing?in 2017 in [place]: variant of uncertain significance (VUS): CDKN2A c.217A>C. This variant is predicted to be benign per in silico analysis (PolyPhen score 0.0).  ---------  ?   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 3 times daily  ---------  ?   # Status post surgical menopause (total hysterectomy for uterine fibroids:?2005)  ----------  ?   # Extremely noncompliant with follow-up?in the past  ---------  ?  ?  Plan:  -Continue gemcitabine  -Restage with contrast-enhanced CT scans of soft tissue of the neck, chest, abdomen, pelvis in 2 months (late June)  -Bilateral diagnostic mammogram (clinical suggestion of inflammatory carcinoma of left breast) and biopsy are pending  -Has severe, grade 4 peripheral neuropathy as side effect of Abraxane  -Continue Cymbalta and  gabapentin  -Avoid chemotherapy with neuropathy potential (for example, paclitaxel, eribulin, vinorelbine, etc.)  -TTE for evaluation of LVEF has been ordered  -Negative for BRCA1/2  -Testing for MSI/MMR has been ordered ordered  -Testing for NTRK gene fusion has been ordered  -Continue narcotics for pain  ?   Follow-up visit with NP in 2 weeks.  ?  Above discussed at length with her.? All questions answered.? Went over labs and scans and gave her copies for her record.? She understands and agrees with this plan.  ----------------  ?  ?  After starting C3D1?of Tecentriq/Abraxane on 03/20/2020,?has been unable to continue chemotherapy because of severe peripheral neuropathy?(side effect of Abraxane)  Needed to change chemotherapy.  Avoid?chemotherapeutic agents with neuropathy potential?(for example,?paclitaxel, eribulin,?vinorelbine, etc.)  Switched?to?gemcitabine?04/22/2020  Chemotherapy schedule:  Gemcitabine 800 to 1200 mg/m? IV days 1, 8, and 15;  Cycled every 28 days  ?  On Cymbalta 60 mg nightly and gabapentin?400 mg p.o. twice daily for severe peripheral neuropathy.  ?  She has family history of breast cancer.?  Genetic?testing in 2017 in [place]:?Negative for BRCA1/2  ?  Check for MSI-H/dMMR.  Check for NTRK gene fusion.  ?  Baseline tumor markers were within normal limits. ?Therefore,?will not be helpful for follow-up.  ?  Has been extremely noncompliant with follow-up?in the past.  No follow-up?between December 2018 and June 2019.? Even subsequently, continued to miss/reschedule appointments.  Missed appointment on 10/30/2019?also.  Missed appointment?for restaging CTs, with considerable delay.  When seen on 11/20/2019,?she was supposed to follow-up with me in 2 weeks?with restaging scans?but she canceled appointment for restaging scans and did not get them done?until 12/30/2019.  Earlier,?after lumpectomy,?she decided not to pursue adjuvant radiation therapy and adjuvant Capecitabine?for  persistent?disease?in triple negative setting?post neoadjuvant chemotherapy.  ?  She refused?screening colonoscopy for a long time.? At last, after she noticed some blood in stool,?scheduled it for 9 January.?  Physical Exam  Vitals & Measurements  T:?37? ?C (Oral)? HR:?85(Peripheral)? HR:?85(Monitored)? RR:?18? BP:?115/72? SpO2:?100%?  HT:?160?cm? WT:?69?kg? BMI:?26.95?   Problem List/Past Medical History  Ongoing  Axillary lymphadenopathy  Cancer of left breast, stage 4  Cancer-related pain  Chemotherapy-induced peripheral neuropathy  Malignant neoplasm of unspecified site of left female breast  Obesity  Supraclavicular lymphadenopathy  Historical  No qualifying data  Procedure/Surgical History  Biopsy Sentinal Node (None) (01/09/2019)  Excision of Left Breast, Open Approach (01/09/2019)  Lumpectomy (None) (01/09/2019)  Mastectomy, partial (eg, lumpectomy, tylectomy, quadrantectomy, segmentectomy); with axillary lymphadenectomy (01/09/2019)  Resection of Left Axillary Lymphatic, Open Approach (01/09/2019)  Hysterectomy (01/01/2005)  BTL  port   Medications  alPRAzolam 1 mg oral tablet, 1 mg= 1 tab(s), Oral, BID  amlodipine 5 mg oral tablet, 5 mg= 1 tab(s), Oral, Now  atorvastatin 10 mg oral tablet, 10 mg= 1 tab(s), Oral, Daily  Cymbalta 60 mg oral delayed release capsule, 60 mg= 1 cap(s), Oral, Daily  gabapentin 400 mg oral capsule, 400 mg= 1 cap(s), Oral, TID, 3 refills  lisinopril 40 mg oral tablet, 40 mg= 1 tab(s), Oral, Daily  megestrol 40 mg/mL oral suspension, 800 mg= 20 mL, Oral, Daily, 1 refills  MS Contin 30 mg oral tablet, extended release, 30 mg= 1 tab(s), Oral, q12hr,? ?Not taking  oxyCODONE 10 mg oral tablet, 10 mg= 1 tab(s), Oral, q6hr, PRN,? ?Not taking  oxyCODONE 20 mg oral tablet, 20 mg= 1 tab(s), Oral, q4hr, PRN  Phenergan 12.5 mg Tab, 12.5 mg= 1 tab(s), Oral, q4hr, PRN  Zofran (for IVPB) 16 mg + dexamethasone 10 mg/mL injectable solution 10 mg  Allergies  No Known Allergies  Social  History  Abuse/Neglect  No, 04/22/2020  Alcohol  Current, 09/22/2019  Current, Beer, Wine, 1-2 times per month, Alcohol use interferes with work or home: No. Drinks more than intended: No. Others hurt by drinking: No. Household alcohol concerns: No., 11/08/2018  Employment/School  medical disability, Work/School description: housekeeping. Previous employment/school: 11th grade. Activity level: Moderate physical work., 10/31/2018  Home/Environment  Lives with living with sister/ some days sleeps in her car or in a hotel. Living situation: Homeless/Shelter. Alcohol abuse in household: No. Substance abuse in household: No. Smoker in household: No. Family/Friends available for support: Yes. Concern for family members at home: No. Major illness in household: No. Financial concerns: Yes. TV/Computer concerns: No., 10/31/2018  Nutrition/Health  Type of diet: homeless/ occasional meals given by sister. Regular, Wants to lose weight: No., 12/04/2018  Sexual  Gender Identity Identifies as female., 07/16/2019  Substance Use  Current, Marijuana, Daily, 01/27/2020  Never, 01/02/2019  Tobacco  10 or more cigarettes (1/2 pack or more)/day in last 30 days, Yes, Cigarettes, 5 per day., 04/22/2020  Family History  Cardiac arrhythmia.: Father.  Congestive heart disease.: Mother.  Diabetes mellitus type 2: Mother.  Hyperlipidemia.: Mother.  Hypertension.: Mother and Father.  Primary malignant neoplasm of breast: Mother.

## 2022-05-01 NOTE — HISTORICAL [facility] CERNER
This is a historical note converted from [employer]. Formatting and pictures may have been removed.  Please reference Keely for original formatting and attached multimedia. History of Present Illness  Problem list  Triple negative left breast cancer?  ?   Past medical history:?Hypertension. ?Total hysterectomy in 2005 in [place] for uterine fibroids which were causing excessive vaginal bleeding, requiring blood transfusion. ?Family history of breast cancer and colon cancer. ?  Social history:?. ?3 children. ?Unemployed. ?Says that she is disabled. ?Has been smoking 5-6 cigarettes daily for last 20 years. ?Occasional beer or wine. ?Denies illicit drug abuse.  Family history:?Mother had breast cancer at age 70. ?Mothers niece had breast cancer and colon cancer at unknown age. ?Another niece of her mother had breast cancer at age 50.  Health maintenance:?Has never had screening colonoscopy done.  Menstrual and OB/GYN history:?Menarche at age 12. ?Had 3 pregnancies; first pregnancy at age 15. ?No abortions or miscarriages. ?No history of hormone replacement therapy. ?Status post total hysterectomy in 2005 for uterine fibroids.  ?   Chief complaint:  Follow-up for triple negative left breast cancer  ?  History of present illness:  53-year-old female. ?Referred from [facility] for evaluation and management of invasive ductal carcinoma.??  ?  For details, please see my last note dated 01/03/2020.? Please also refer to assessment and plan section where information is summarized.  ?  Interval History?  ?   10/31/2018:  Presents for initial oncology consultation after moving to [place]. ?Last followed up with her oncologist at [facility] on 09/18/2018. ?Apparently, when she developed left supraclavicular lymph node metastasis, she was told that she has developed stage IV disease, and was palliatively treated with every 3 week carboplatin. ?Surgical  consultation was apparently never obtained. ?On todays visit, she says that overall, she is doing quite well, and rates her general health is 9 on a scale of 1-10, if 10 could be the best. ?Main complaints are weight fluctuations, night sweats, lump in the neck, persistent lump in the left breast which, according to her, never shunt with neoadjuvant chemotherapy, cough, hemorrhoids, and anxiety. ?Reports pain all over the body, 9 on a scale of 1-10, if 10 could be the worst. ?She is asking for refill of her narcotics (oxycodone) as well as Xanax. ?Denies pain in the breast or nipple discharge. ?Appetite is good. ?No unintentional weight loss. ?No unusual headaches or focal neurological symptoms. ?No vision impairment. ?No chest pain or dyspnea. ?No fevers or chills. ?No abdominal pain, nausea, or vomiting. ?No blood in stool. ?No hematemesis, melena, or hematochezia. ?No dysphagia or odynophagia. ?No anorectal pain. ?No abnormal vaginal bleeding or discharge. ?No urinary problems.  ?   06/04/2020:  -04/30/2020:  1.? Left axillary 1 x 0.8 x 1 cm lymph node/mass at 1:00, 15 cm from nipple, ultrasound-guided core biopsy: Fibroadipose tissue with necrosis and foci of poorly differentiated carcinoma; no lymphoid tissue  2.? Left breast 1.2 x 0.8 x 0.8 cm mass at 5:00, 2 cm from nipple, ultrasound-guided core biopsy: No evidence of malignancy  3.? Left breast 0.7 x 0.8 x 0.7 cm mass at 3:00, 5 cm from nipple, ultrasound-guided core biopsy: No evidence of malignancy  -05/20/2020: TTE: LVEF 60%  -Cycle #2 of gemcitabine, scheduled for 05/20/2020, was held because of severe neutropenia (ANC 50/mm?)  MMR expression preserved  NTRK gene fusion negative  -06/04/2020: CMP unremarkable.? CBC unremarkable.  Presents?for follow-up visit.? Minimal from?pain in the left shoulder area, supraclavicular area, and behind the left shoulder.? Metastatic cancer is protruding through the skin at the base of the neck?on the left side, most  likely a lymph node; no ulceration or bleeding.? No fevers or chills. ?No unusual headaches or focal?neurological symptoms.? Good appetite?as long as she smokes weed and takes Megace.? No chest pain, cough, or dyspnea. ?No abdominal pain, nausea, or vomiting.? No bleeding or bruising.? 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 nightly?and gabapentin 600 mg p.o. 3 times a day.? Lower extremities?are numb as a result of which she falls once in a while.  ?  ?  Review of Systems?  ?????12 point review of systems done in full with pertinent positives as described in interval history.?  ?   ??  Physical Examination:???  General: ?Alert and oriented, No acute distress. ?  ?????Eye: ?Pupils are equal, round and reactive to light, Extraocular movements are intact, Normal conjunctiva. ?  ?????HENT: ?Normocephalic, Oral mucosa is moist, No pharyngeal erythema, No sinus tenderness. ?  ?????Neck: ?Supple, Non-tender, No jugular venous distention, No thyromegaly,?10/31/2018:  ?????A couple of small, subcentimeter, firm lymph nodes are palpable in the left supraclavicular area, nontender.  ?????No palpable lymphadenopathy in axillary or infraclavicular areas.. ?  ?????Respiratory: ?Lungs are clear to auscultation, Respirations are non-labored, Breath sounds are equal, Symmetrical chest wall expansion, No chest wall tenderness. ?  ?????Cardiovascular: ?Normal rate, Regular rhythm, No murmur, No gallop. ?  ?????Breast: ?10/31/2018:  ?????Bilateral breast examination was performed with her verbal consent, in the presence of Prenella, our LPN.  ?????In the left supraclavicular area, a couple of small, subcentimeter, nontender lymph nodes are palpable, firm in consistency.  ?????Left breast: About 3 cm nontender mobile mass is palpable at 2 oclock position in the left breast, about 10-12 cm from the nipple. ?No skin changes. ?No skin thickening. ?No ulceration. ?No nipple  discharge.  ?????Right breast: Free from any suspicious lesions or lymphadenopathy.. ?  ?????Gastrointestinal: ?Soft, Non-tender, Non-distended, No organomegaly. ?  ?????Genitourinary: ?No costovertebral angle tenderness, No inguinal tenderness. ?  ?????Lymphatics: ?No lymphadenopathy neck, axilla, groin. ?  ?????Neurologic: ?Alert, Oriented, Normal sensory, Normal motor function, No focal deficits. ?  ?????Cognition and Speech: ?Oriented, Speech clear and coherent, Functional cognition intact. ?  ?????Psychiatric: ?Cooperative, Appropriate mood & affect, Normal judgment, Non-suicidal.  06/25/2019:?Enlarging left supraclavicular lymphadenopathy, exquisitely tender;?exquisite tenderness to lower part?of cervical spine/upper part of thoracic spine;?neurologically, intact, without objective sensory or motor impairment;?no gait impairment; bilateral breast examination performed with her verbal consent,?in the presence of her daughter and Prenkei, LPN;?no suspicious?lesion in either breast; no palpable lymphadenopathy in axillary, supraclavicular,?infraclavicular, or cervical areas on either side.  07/16/2019:?Exquisitely tender?left lower cervical lymphadenopathy.  08/29/2019:?Left supraclavicular lymph node continues to enlarge; now,?overlying skin is?tense and shiny;?no ulceration at this time?but?ulceration seems imminent; exquisitely tender.  11/20/2019:?She did not let me examine left supraclavicular and left axillary area, saying that the lymph nodes remain extremely tender  04/16/2020:?Miserable from peripheral neuropathy and left neck pain;?severe tenderness in the left supraclavicular area?with diffuse, poorly defined?firm fullness;?soft tissue exophytic?growth is noted, without ulceration or bleeding;?severe tenderness in the left axilla;?range of motion?at the left shoulder is very limited;?breast examination was performed with?her verbal consent, in the presence of Prenkei LPN;?left breast is enlarged,  with diffuse orange peel appearance of skin with some erythema, without skin ulceration/satellite nodules/nipple retraction/nipple discharge, strongly suspicious for inflammatory?carcinoma of left breast.? Right breast appears normal.  06/04/2020:?Left breast area examined with her verbal consent, in the presence of Prenella, LPN;?pain and tenderness over left shoulder area?is so severe that she?does not?let me examine, as usual.? Cancer is protruding through the skin at the base of the neck on the left side, without any bleeding.? Orange peel appearance of the skin of the left breast is noted, without erythema.  ?   ???  Assessment:  To summarize:  # High-grade triple negative left breast cancer,?axillary lymph nodes positive, diagnosed 08/2017  Neoadjuvant DDAC x4, then dose dense Taxol x4 (10/26/2017-02/27/2018)  ?   >>>  Developed left supraclavicular malignant lymphadenopathy (biopsy 02/09/2018)  Treated with palliative carboplatin AUC 6 q3 weeks x 9 (03/2018-09/2018)?at outside facility  Left breast mass, 3 cm by palpation, never?shrunk with chemotherapy  No distant metastasis?(PET/CT: 11/2018)  Left breast lumpectomy, SLN biopsy (01/09/2019)  -->ypT2 pN0(sn), triple negative  PD-L1 tumor proportion >1 (expressed)  She declined adjuvant radiation therapy and adjuvant Capecitabine  Noncompliant with follow-up  ?   >>>  Progression:  Disease relapse/progression (07/2019) (left axillary, left supraclavicular lymphadenopathy)  Pain and tenderness over lower cervical/upper thoracic spine?(NO metastasis?in spine)  -Status post palliative radiation therapy to painful, tender?malignant lymphadenopathy (09/05/2019-09/27/2019), without significant decrease in size of left neck mass/lymphadenopathy  -Restaging CTs and bone scan (12/30/2019):?No disease progression;?interval response to radiation therapy?with left supraclavicular necrotic lymph nodes?smaller  -Baseline tumor markers within normal limits  (11/20/2019)  -Started Tecentriq/Abraxane 01/13/2020  -Started?third cycle of chemotherapy 03/20/2020  -->Thereafter,?did not present for chemotherapy secondary to severe peripheral neuropathy?(side effect of Abraxane)  -Restaging CT C/A/P?(04/15/2020):?Stable left axillary lymph nodes  (CT soft tissue of the neck pending)  ?  >>>  Progression:  -04/16/2020:?Appearance of inflammatory carcinoma of left breast;?left breast?enlarging, painful, and tender;?extremely tender,?firm soft tissue fullness/lymphadenopathy left supraclavicular area?and left side of the neck  -->?She has failed Tecentriq/Abraxane  -->?She has also experienced severe peripheral neuropathy?as side effect of Abraxane, grade 4  -Restaging CT?soft tissue of the neck (04/24/2020):?Enlargement of left supraclavicular necrotic lymph node;?worsening of generalized?degenerative edema throughout the left supraclavicular, pectoral,?axillary region; enlargement of the left subpectoral?axillary lymph node at the edge of the field-of-view  -Restaging bone scan (04/24/2020):?No bone metastasis  -Gemcitabine started 04/22/2020  -Genetic testing?in 2017 in [place]: variant of uncertain significance (VUS): CDKN2A c.217A>C. This variant is predicted to be benign per in silico analysis (PolyPhen score 0.0).  -04/30/2020:?Core biopsy of?1/3?left breast lesion positive for?poorly differentiated carcinoma  -Severe neutropenia (ANC 54 mm?)?05/20/2020;?second cycle of gemcitabine held  -05/20/2020: LVEF?60%?(TTE):  ---------  ?  Sites of disease:  Left breast; left supraclavicular lymph nodes; left axillary lymph nodes; status post left breast lumpectomy  ---------  ?  #Molecular markers:  -PD-L1 tumor proportion score >1 (expressed)  -BRCA1/2 negative (2017, [place])  -MMR expression preserved  -NTRK gene fusion negative  -------  ?  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 3 times daily  ---------  ?  # Status post surgical menopause (total hysterectomy for uterine fibroids:?2005)  ----------  ?  # Extremely noncompliant with follow-up?in the past  ---------  ?  ?  Plan:  -So far, no response to palliative chemotherapy whatsoever  -Continue gemcitabine?(so far, she has had only 1 cycle)  -Proceed with second cycle of chemotherapy?as soon as possible  -Gemcitabine dose was already reduced by 50% from C1D15 onwards because of neutropenia  -Restage with contrast-enhanced CT scans of soft tissue of the neck, chest, abdomen, pelvis in 2 months (late June)  -Clinical suggestion of inflammatory carcinoma of left breast?(biopsy positive for poorly?differentiated carcinoma)  -Has severe, grade 4 peripheral neuropathy as side effect of Abraxane  -Continue Cymbalta and gabapentin  -Avoid chemotherapy with neuropathy potential (for example, paclitaxel, eribulin, vinorelbine, etc.)  -Negative for BRCA1/2  -MMR expression preserved  -NTRK gene fusion negative  -PD-L1?TPS >1 (expressed)  -Continue narcotics for pain  (06/04/2020:?Discontinue MS Contin, not working; substitute?fentanyl skin patch 25 mcg/hour; on oxycodone 20 mg p.o. every 4 hours as needed?for breakthrough pain, not working;?increase oxycodone dose to 30 mg p.o. every 4 hours as needed)  -Check CBC and CMP weekly  ?  Follow-up visit with NP in 2 weeks.  ?  Above discussed at length with her. ?All questions answered. ?She understands and agrees with this plan.  ---------  ?  ?  After starting C3D1?of Tecentriq/Abraxane on 03/20/2020,?has been unable to continue chemotherapy because of severe peripheral neuropathy?(side effect of Abraxane)  Needed to change chemotherapy.  Avoid?chemotherapeutic agents with neuropathy potential?(for example,?paclitaxel, eribulin,?vinorelbine, etc.)  Switched?to?gemcitabine?04/22/2020  Chemotherapy  schedule:  Gemcitabine 800 to 1200 mg/m? IV days 1, 8, and 15;  Cycled every 28 days  ?  Dose modifications?for myelotoxicity:  -This regimen should not be initiated unless the white blood cell count is >3500 cells/microL and platelets are??100,000/microL  -During therapy, the dose of gemcitabine should be decreased by 25% if the absolute neutrophil count decreases to <1000 cells/microL but??500 cells/microL, or the platelets decrease to <100,000/microL and??50,000/microL  -The [place] Prescribing Information recommends holding gemcitabine for an absolute neutrophil count <500 cells/microL or platelets <50,000/microL.  ?  On Cymbalta 60 mg nightly and gabapentin?400 mg p.o. twice daily for severe peripheral neuropathy.  ?  She has family history of breast cancer.?  Genetic?testing in 2017 in [place]:?Negative for BRCA1/2  ?  Check for MSI-H/dMMR.  Check for NTRK gene fusion.  ?  Baseline tumor markers were within normal limits. ?Therefore,?will not be helpful for follow-up.  ?  Has been extremely noncompliant with follow-up?in the past.  No follow-up?between December 2018 and June 2019.? Even subsequently, continued to miss/reschedule appointments.  Missed appointment on 10/30/2019?also.  Missed appointment?for restaging CTs, with considerable delay.  When seen on 11/20/2019,?she was supposed to follow-up with me in 2 weeks?with restaging scans?but she canceled appointment for restaging scans and did not get them done?until 12/30/2019.  Earlier,?after lumpectomy,?she decided not to pursue adjuvant radiation therapy and adjuvant Capecitabine?for persistent?disease?in triple negative setting?post neoadjuvant chemotherapy.  ?  She refused?screening colonoscopy for a long time.? At last, after she noticed some blood in stool,?scheduled it for 9 January.?  Physical Exam  Vitals & Measurements  T:?37? ?C (Oral)? HR:?81(Peripheral)? HR:?81(Monitored)? RR:?18? BP:?136/92? SpO2:?100%?  HT:?160?cm? WT:?70.2?kg?  BMI:?27.42?   Problem List/Past Medical History  Ongoing  Axillary lymphadenopathy  Cancer of left breast, stage 4  Cancer-related pain  Chemotherapy-induced peripheral neuropathy  Malignant neoplasm of unspecified site of left female breast  Obesity  Supraclavicular lymphadenopathy  Historical  No qualifying data  Procedure/Surgical History  Biopsy or excision of lymph node(s); by needle, superficial (eg, cervical, inguinal, axillary) (04/30/2020)  Biopsy, breast, with placement of breast localization device(s) (eg, clip, metallic pellet), when performed, and imaging of the biopsy specimen, when performed, percutaneous; each additional lesion, including ultrasound guidance (List separately in additi (04/30/2020)  Biopsy, breast, with placement of breast localization device(s) (eg, clip, metallic pellet), when performed, and imaging of the biopsy specimen, when performed, percutaneous; first lesion, including ultrasound guidance (04/30/2020)  Biopsy Sentinal Node (None) (01/09/2019)  Excision of Left Breast, Open Approach (01/09/2019)  Lumpectomy (None) (01/09/2019)  Mastectomy, partial (eg, lumpectomy, tylectomy, quadrantectomy, segmentectomy); with axillary lymphadenectomy (01/09/2019)  Resection of Left Axillary Lymphatic, Open Approach (01/09/2019)  Hysterectomy (01/01/2005)  BTL  port   Medications  alPRAzolam 1 mg oral tablet, 1 mg= 1 tab(s), Oral, BID  amlodipine 5 mg oral tablet, 5 mg= 1 tab(s), Oral, Now  atorvastatin 10 mg oral tablet, 10 mg= 1 tab(s), Oral, Daily  Cymbalta 60 mg oral delayed release capsule, 60 mg= 1 cap(s), Oral, At Bedtime  gabapentin 600 mg oral tablet, 600 mg= 1 tab(s), Oral, TID, 2 refills  lisinopril 40 mg oral tablet, 40 mg= 1 tab(s), Oral, Daily  megestrol 40 mg/mL oral suspension, 800 mg= 20 mL, Oral, Daily, 1 refills  MS Contin 30 mg oral tablet, extended release, 30 mg= 1 tab(s), Oral, q12hr  oxyCODONE 20 mg oral tablet, 20 mg= 1 tab(s), Oral, q4hr, PRN  Phenergan 12.5 mg Tab,  12.5 mg= 1 tab(s), Oral, q4hr, PRN,? ?Not Taking, Completed Rx  Zofran (for IVPB) 16 mg + dexamethasone 10 mg/mL injectable solution 10 mg  Allergies  No Known Allergies  Social History  Abuse/Neglect  No, 05/26/2020  No, 05/20/2020  Alcohol  Current, 09/22/2019  Current, Beer, Wine, 1-2 times per month, Alcohol use interferes with work or home: No. Drinks more than intended: No. Others hurt by drinking: No. Household alcohol concerns: No., 11/08/2018  Employment/School  medical disability, Work/School description: housekeeping. Previous employment/school: 11th grade. Activity level: Moderate physical work., 10/31/2018  Home/Environment  Lives with living with sister/ some days sleeps in her car or in a hotel. Living situation: Homeless/Shelter. Alcohol abuse in household: No. Substance abuse in household: No. Smoker in household: No. Family/Friends available for support: Yes. Concern for family members at home: No. Major illness in household: No. Financial concerns: Yes. TV/Computer concerns: No., 10/31/2018  Nutrition/Health  Type of diet: homeless/ occasional meals given by sister. Regular, Wants to lose weight: No., 12/04/2018  Sexual  Gender Identity Identifies as female., 07/16/2019  Substance Use  Current, Marijuana, Daily, 01/27/2020  Never, 01/02/2019  Tobacco  4 or less cigarettes(less than 1/4 pack)/day in last 30 days, No, 05/26/2020  4 or less cigarettes(less than 1/4 pack)/day in last 30 days, No, 05/21/2020  Family History  Cardiac arrhythmia.: Father.  Congestive heart disease.: Mother.  Diabetes mellitus type 2: Mother.  Hyperlipidemia.: Mother.  Hypertension.: Mother and Father.  Primary malignant neoplasm of breast: Mother.

## 2022-05-01 NOTE — HISTORICAL [facility] CERNER
This is a historical note converted from [facility]. Formatting and pictures may have been removed.  Please reference [employer] for original formatting and attached multimedia. Upon prepping patients chart for upcoming appointment dated 08/28/2020 the patient has not received treatment since 07/20/2020. At this time she is also due for restaging scans which will need to be completed and reviewed by Dr. Kumar prior to restarting gemzar. She has been extremely non-compliant and continues to no show for treatment and scheduled FUs.  ?  Marla Wilcox, NP

## 2022-05-01 NOTE — HISTORICAL [facility] CERNER
This is a historical note converted from [facility]. Formatting and pictures may have been removed.  Please reference [facility] for original formatting and attached multimedia. History of Present Illness  Problem list  Triple negative left breast cancer?  ?   Past medical history:?Hypertension. ?Total hysterectomy in 2005 in [place] for uterine fibroids which were causing excessive vaginal bleeding, requiring blood transfusion. ?Family history of breast cancer and colon cancer. ?  Social history:?. ?3 children. ?Unemployed. ?Says that she is disabled. ?Has been smoking 5-6 cigarettes daily for last 20 years. ?Occasional beer or wine. ?Denies illicit drug abuse.  Family history:?Mother had breast cancer at age 70. ?Mothers niece had breast cancer and colon cancer at unknown age. ?Another niece of her mother had breast cancer at age 50.  Health maintenance:?Has never had screening colonoscopy done.  Menstrual and OB/GYN history:?Menarche at age 12. ?Had 3 pregnancies; first pregnancy at age 15. ?No abortions or miscarriages. ?No history of hormone replacement therapy. ?Status post total hysterectomy in 2005 for uterine fibroids.  ?   Chief complaint:  Follow-up for triple negative left breast cancer  ?  History of present illness:  53-year-old female. ?Referred from [facility] for evaluation and management of invasive ductal carcinoma.??  ?  # Poorly differentiated triple negative invasive ductal carcinoma of left breast, 2 oclock position, with axillary and supraclavicular?lymph node?involvement. ?Ultrasound-guided left breast biopsy and left axillary lymph node biopsy positive on 8/28/2017.  -(Neoadjuvant?in intent)?dose dense AC?x4 (10/26/2017-12/27/2017), then dose dense Taxol x4 (01/11/2018-02/27/2018).  -Developed?metastasis to left supraclavicular lymph nodes, status post ultrasound-guided biopsy 02/09/2018.  -With the assumption of stage IV disease?based upon  supraclavicular?lymph node metastasis?(whereas actually it was stage IIIC disease),?treated with palliative carboplatin AUC 6 every 3 weeks, started 03/19/2018, ninth cycle on 09/18/2018,?without?surgical evaluation for resection?(treated?at?outside hospital).  -Restaging scans 09/12/2018 with decrease in size of left supraclavicular malignant lymph node.  -11/15/2018: PET/CT: No distant metastasis?????????  To summarize/clarify:  -High-grade triple negative left breast cancer, with biopsy-proven left axillary lymph venu metastasis, diagnosed in 08/2017 in this 53-year-old lady who is status post surgical menopause. ?  -Status post neoadjuvant dose dense AC x4 (10/26/2017-12/27/2017), then dose dense paclitaxel x4 (01/11/2018-02/27/2018). ?  -Developed left supraclavicular lymph node metastasis, status post ultrasound-guided lymph node biopsy 02/2018, then?  -labeled as stage IV (for?what actually?appears to have been stage IIIC disease)?and  -treated with palliative carboplatin?every 3 week starting 03/19/2018, status post ninth cycle 09/18/2018. ?  -With assumption of stage IV disease, apparently,?never evaluated for?resection.??  -Staging CT scans of C/A/P/soft?tissues of the neck on 09/12/2018 showed left supraclavicular lymph node 6 mm, down from 1.1 cm initially, as well as a cluster of smaller supraclavicular lymph nodes with equivocal change. ?  -The left breast mass, 3 cm by palpation, apparently never shrunk with chemotherapy, according to the patient.  -Excellent performance status, ECOG 0.  -Status post ninth cycle of every 3 week carboplatin, 09/18/2018  -No distant metastases on PET/CT 11/15/2018  -Per patient,?genetic evaluation?in the past negative for any inherited high risk mutations  -12/04/2018: Bilateral diagnostic mammogram: Lobulated left upper outer quadrant mass corresponding to palpable abnormality measuring 2.8 cm.  -01/09/2019: Left breast lumpectomy; level 1 axillary lymph node  dissection.  Pathology: Invasive carcinoma with medullary features; 2.8 cm; grade 3, poorly differentiated; triple negative;?no DCIS; margins negative, distance from closest margin <1 mm; number of lymph nodes examined, 1; number of sentinel lymph nodes examined, 1; number of lymph nodes involved by tumor, 0  --> ypT2 pN0(sn), triple negative  -PD-L1 tumor proportion >1 (expressed)  -She decided?not to pursue?adjuvant?radiation therapy and adjuvant Capecitabine.  -06/25/2019:?Presents for follow-up 6 months after last visit?with worsening pain and tenderness?over lower cervical/upper thoracic spine?and?enlarging left supraclavicular lymphadenopathy for 2 weeks.  -07/10/2019: Restaging bone scan: No bone metastasis  -07/09/2019: Restaging CTs C/A/P with contrast: Several enlarged left axillary and lower cervical lymph nodes, centrally hypodense, suggestive of necrosis; no distant metastasis  -07/09/2019: CT soft tissue of the neck with contrast: Left supraclavicular lymphadenopathy with a decreased central density suggesting necrosis, larger since prior PET/CT dated 11/15/2018; 1.5 cm x 2.4 cm; 1.8 cm x 1.8 cm  -07/09/2019: MRI cervical spine with and without contrast: Abnormal signal at the inferior half of C6 and superior C7 endplate with associated bone marrow edema and enhancement (primary differential is asymmetric degenerative disc changes; sclerotic metastasis is within the differential but less likely given the location and enhancement pattern); left lateral recess narrowing at C5-C6 secondary to disc protrusion  -06/25/2019: CMP unremarkable.? CBC unremarkable.  -->  07/2019:?Disease relapse/progression:?Left axillary?and left supraclavicular lymphadenopathy; no distant metastasis; no bone metastasis;?pain and tenderness over lower cervical/upper thoracic spine is not from metastasis  -Status post palliative radiation therapy to left neck and axilla (malignant lymphadenopathy) (09/05/2019-09/27/2019),  without significant decrease in size of left neck mass/lymphadenopathy  ?  ?   # Extremely noncompliant with follow-up.  Did not follow-up with us?between December 2018 and June 2019.  -Never called to make appointment with radiation oncology for adjuvant radiation therapy.  -Never presented to us?for adjuvant chemotherapy with Capecitabine?old left breast lumpectomy, with residual tumor?in triple negative setting  ?  Interval History?  ?   10/31/2018:  Presents for initial oncology consultation after moving to [place]. ?Last followed up with her oncologist at [facility] on 09/18/2018. ?Apparently, when she developed left supraclavicular lymph node metastasis, she was told that she has developed stage IV disease, and was palliatively treated with every 3 week carboplatin. ?Surgical consultation was apparently never obtained. ?On todays visit, she says that overall, she is doing quite well, and rates her general health is 9 on a scale of 1-10, if 10 could be the best. ?Main complaints are weight fluctuations, night sweats, lump in the neck, persistent lump in the left breast which, according to her, never shunt with neoadjuvant chemotherapy, cough, hemorrhoids, and anxiety. ?Reports pain all over the body, 9 on a scale of 1-10, if 10 could be the worst. ?She is asking for refill of her narcotics (oxycodone) as well as Xanax. ?Denies pain in the breast or nipple discharge. ?Appetite is good. ?No unintentional weight loss. ?No unusual headaches or focal neurological symptoms. ?No vision impairment. ?No chest pain or dyspnea. ?No fevers or chills. ?No abdominal pain, nausea, or vomiting. ?No blood in stool. ?No hematemesis, melena, or hematochezia. ?No dysphagia or odynophagia. ?No anorectal pain. ?No abnormal vaginal bleeding or discharge. ?No urinary problems.  ?   11/20/2019:  Has completed palliative radiation therapy?to left neck and axilla (painful, exquisitely tender malignant  lymphadenopathy),?without significant?decrease in size?of the mass/lymphadenopathy.  -No showed on 10/30/2019  Presents for follow-up visit, accompanied by 2 sisters.? Says that?lymph nodes in the left supraclavicular area and left axillary area are much smaller?after radiation therapy but continued to be painful and tender. ?Did not let me examine because of severe tenderness.? No fevers or chills. ?No weakness or fatigue. ?No unusual headaches or focal neurological symptoms. ?Good appetite. ?No chest pain, cough, or dyspnea. ?No bone pains. ?No abdominal pain, nausea, vomiting, or GI bleeding.? No urinary problems.  ?   ?  Review of Systems?  ?????12 point review of systems done in full with pertinent positives as described in interval history.?  ?  ??  Physical Examination:???  General: ?Alert and oriented, No acute distress. ?  ?????Eye: ?Pupils are equal, round and reactive to light, Extraocular movements are intact, Normal conjunctiva. ?  ?????HENT: ?Normocephalic, Oral mucosa is moist, No pharyngeal erythema, No sinus tenderness. ?  ?????Neck: ?Supple, Non-tender, No jugular venous distention, No thyromegaly,?10/31/2018:  ?????A couple of small, subcentimeter, firm lymph nodes are palpable in the left supraclavicular area, nontender.  ?????No palpable lymphadenopathy in axillary or infraclavicular areas.. ?  ?????Respiratory: ?Lungs are clear to auscultation, Respirations are non-labored, Breath sounds are equal, Symmetrical chest wall expansion, No chest wall tenderness. ?  ?????Cardiovascular: ?Normal rate, Regular rhythm, No murmur, No gallop. ?  ?????Breast: ?10/31/2018:  ?????Bilateral breast examination was performed with her verbal consent, in the presence of Prenella, our LPN.  ?????In the left supraclavicular area, a couple of small, subcentimeter, nontender lymph nodes are palpable, firm in consistency.  ?????Left breast: About 3 cm nontender mobile mass is palpable at 2 oclock position in the left  breast, about 10-12 cm from the nipple. ?No skin changes. ?No skin thickening. ?No ulceration. ?No nipple discharge.  ?????Right breast: Free from any suspicious lesions or lymphadenopathy.. ?  ?????Gastrointestinal: ?Soft, Non-tender, Non-distended, No organomegaly. ?  ?????Genitourinary: ?No costovertebral angle tenderness, No inguinal tenderness. ?  ?????Lymphatics: ?No lymphadenopathy neck, axilla, groin. ?  ?????Neurologic: ?Alert, Oriented, Normal sensory, Normal motor function, No focal deficits. ?  ?????Cognition and Speech: ?Oriented, Speech clear and coherent, Functional cognition intact. ?  ?????Psychiatric: ?Cooperative, Appropriate mood & affect, Normal judgment, Non-suicidal.  06/25/2019:?Enlarging left supraclavicular lymphadenopathy, exquisitely tender;?exquisite tenderness to lower part?of cervical spine/upper part of thoracic spine;?neurologically, intact, without objective sensory or motor impairment;?no gait impairment; bilateral breast examination performed with her verbal consent,?in the presence of her daughter and Prenella, LPN;?no suspicious?lesion in either breast; no palpable lymphadenopathy in axillary, supraclavicular,?infraclavicular, or cervical areas on either side.  07/16/2019:?Exquisitely tender?left lower cervical lymphadenopathy.  08/29/2019:?Left supraclavicular lymph node continues to enlarge; now,?overlying skin is?tense and shiny;?no ulceration at this time?but?ulceration seems imminent; exquisitely tender.  11/20/2019:?She did not let me examine left supraclavicular and left axillary area, saying that the lymph nodes remain extremely tender  ?  ???  Assessment:  To summarize:  # High-grade triple negative left breast cancer,?axillary lymph nodes positive, diagnosed 08/2017  Neoadjuvant DDAC x4, then dose dense Taxol x4 (10/26/2017-02/27/2018)  Developed left supraclavicular malignant lymphadenopathy (biopsy 02/09/2018)  Treated with palliative carboplatin AUC 6 q3 weeks x 9  (03/2018-09/2018)  Left breast mass, 3 cm by palpation, never?shrunk with chemotherapy  No distant metastasis?(PET/CT 11/2018)  Left breast lumpectomy, SLN biopsy (01/09/2019)  -->  ypT2 pN0(sn), triple negative  PD-L1 tumor proportion >1 (expressed)  She declined adjuvant radiation therapy and adjuvant Capecitabine  Noncompliant with follow-up  Disease relapse/progression (07/2019) (left axillary, left supraclavicular lymphadenopathy)  Pain and tenderness over lower cervical/upper thoracic spine,?NOT from metastasis  -Status post palliative radiation therapy to painful, tender?malignant lymphadenopathy (09/05/2019-09/27/2019), without significant decrease in size of left neck mass/lymphadenopathy  ?  ?  # Status post surgical menopause (2005, uterine fibroids)  ?  ?  # Extremely noncompliant with follow-up  ?  ?  Plan:  Has completed palliative radiation therapy?to left neck and axilla (painful, exquisitely tender malignant lymphadenopathy)?with a visible?improvement in left supraclavicular and left axillary lymphadenopathy?but the lymph nodes remain extremely tender, precluding palpatory examination.  ?  Now, need to proceed with palliative chemotherapy?(atezolizumab plus Abraxane).  ?  Chemotherapy schedule:  1.? Days 1 and 15: Atezolizumab 840 mg IV over 60 minutes, followed by:  2.? Days 1, 8, 15: Abraxane 100 mg/m? IV  -Repeat cycle every 4 weeks  ?  -The combination prolongs progression free survival among patients with metastatic triple negative breast cancer, PDL 1+  -Median progression free survival 7.5 months (versus 5.0 months with placebo plus Abraxane)  -Median overall survival 25.0 months (versus 15.5 months with placebo plus Abraxane)  -Adverse events leading to discontinuation of either atezolizumab or Abraxane, according and 15.9% patients  ?  (Reference:?Argelia P, Sanjay S, Olamide HS, et al. Atezolizumab and nab-paclitaxel in advanced triple-negative breast cancer. N Engl J Med.  2018;379:4606-7267)  ?  Before starting chemotherapy, will restage with contrast-enhanced CT scans of chest, abdomen,?pelvis, and soft tissues of the neck, as well as whole-body nuclear medicine bone scan at this time.  ?  Recheck tumor markers to establish a baseline?(CEA, CA 15-3,?and CA 27.29 level).  ?  No cervical spine metastasis?on imaging studies?(MRI scan equivocal?but bone scan negative).  ?  Has been extremely noncompliant with follow-up.  No follow-up?between December 2018 and June 2019.? Even subsequently, continued to miss/reschedule appointments.  Missed appointment on 10/30/2019?also.  ?  Today, 11/20/2019,?clearly told her?that if she continues to miss appointments?and refuse?cancer treatment, then will stop?prescribing narcotics?and she will need to procure these medications?via?palliative care or hospice care.  ?  Earlier,?after lumpectomy,?she decided not to pursue adjuvant radiation therapy and adjuvant Capecitabine?for persistent?disease?in triple negative setting?post neoadjuvant chemotherapy.  ?  She never called to schedule?her?screening colonoscopy.  At this time,?with ongoing treatment for metastatic breast cancer,?this is not a priority.  ?  -She has family history of breast cancer.? Tells me that she has already undergone genetic evaluation at [facility]?and was found to be negative for any?inherited?high risk mutations.  We have been requesting that report from [facility]?for months without any success.  ?  At this time, will arrange for?genetic counseling/testing?with our .? Results will be important in terms of selection of?systemic therapy agents.  ?  She says oxycodone does not work well for her pain?and that she would like to try Norco.  Start Norco 10 mg?p.o. every 6 hours as needed (50 pills E scribed).  ?  Follow-up visit in 2 weeks with scans and labs; chemotherapy to start thereafter.  ?  Above discussed at length with her. ?All questions  answered. ?Compliance once again emphasized.? She understands and agrees with this plan.  Physical Exam  Vitals & Measurements  T:?36.9? ?C (Oral)? HR:?86(Peripheral)? RR:?18? BP:?142/90? SpO2:?92%?  HT:?154?cm? WT:?70.3?kg? BMI:?29.64?   Problem List/Past Medical History  Ongoing  Breast mass  Cancer of left female breast  Hyperlipidemia  Hypertension  Obesity  Historical  No qualifying data  Procedure/Surgical History  Biopsy Sentinal Node (None) (01/09/2019)  Excision of Left Breast, Open Approach (01/09/2019)  Lumpectomy (None) (01/09/2019)  Mastectomy, partial (eg, lumpectomy, tylectomy, quadrantectomy, segmentectomy); with axillary lymphadenectomy (01/09/2019)  Resection of Left Axillary Lymphatic, Open Approach (01/09/2019)  Hysterectomy (01/01/2005)  BTL  port   Medications  acetaminophen-hydrocodone 325 mg-10 mg oral tablet, 1 tab(s), Oral, q6hr, PRN,? ?Not taking  ALPRAZOLAM 0.5 MG TABLET, 0.5 mg= 1 tab(s), Oral, Daily,? ?Not taking  alPRAzolam 1 mg oral tablet, 1 mg= 1 tab(s), Oral, BID,? ?Not taking  amlodipine 5 mg oral tablet, 5 mg= 1 tab(s), Oral, Now  amLODIPine 5 mg oral tablet, 5 mg= 1 tab(s), Oral, Daily  atorvastatin 10 mg oral tablet, 10 mg= 1 tab(s), Oral, Daily  cyclobenzaprine 10 mg oral tablet, 10 mg= 1 tab(s), Oral, BID,? ?Not taking: duplicate  cyproheptadine 4 mg oral tablet, 4 mg= 1 tab(s), Oral, BID, 1 refills,? ?Not taking  lisinopril 40 mg oral tablet, 40 mg= 1 tab(s), Oral, Daily  Megace 40 mg/mL oral suspension, 800 mg= 20 mL, Oral, Daily, 1 refills,? ?Not taking  megestrol 40 mg/mL oral suspension, 800 mg= 20 mL, Oral, Daily, 1 refills  oxyCODONE 10 mg oral tablet, 10 mg= 1 tab(s), Oral, q4hr, PRN  OXYCODONE HCL 10 MG TABLET, 10 mg= 1 tab(s), Oral, TID,? ?Not taking  traMADol 50 mg oral tablet, 50 mg= 1 tab(s), Oral, q6hr, PRN,? ?Not taking  Allergies  No Known Allergies  Social History  Abuse/Neglect  No, No, Yes, 11/12/2019  No, 09/22/2019  No, 07/23/2019  Alcohol  Current,  09/22/2019  Current, Beer, Wine, 1-2 times per month, Alcohol use interferes with work or home: No. Drinks more than intended: No. Others hurt by drinking: No. Household alcohol concerns: No., 11/08/2018  Employment/School  medical disability, Work/School description: housekeeping. Previous employment/school: 11th grade. Activity level: Moderate physical work., 10/31/2018  Home/Environment  Lives with living with sister/ some days sleeps in her car or in a hotel. Living situation: Homeless/Shelter. Alcohol abuse in household: No. Substance abuse in household: No. Smoker in household: No. Family/Friends available for support: Yes. Concern for family members at home: No. Major illness in household: No. Financial concerns: Yes. TV/Computer concerns: No., 10/31/2018  Nutrition/Health  Type of diet: homeless/ occasional meals given by sister. Regular, Wants to lose weight: No., 12/04/2018  Sexual  Gender Identity Identifies as female., 07/16/2019  Substance Use  Never, 01/02/2019  Tobacco  4 or less cigarettes(less than 1/4 pack)/day in last 30 days, No, Cigarettes, 4 per day. Household tobacco concerns: No., 11/12/2019  10 or more cigarettes (1/2 pack or more)/day in last 30 days, Yes, 09/22/2019  4 or less cigarettes(less than 1/4 pack)/day in last 30 days, Yes, 08/29/2019  Family History  Cardiac arrhythmia.: Father.  Congestive heart disease.: Mother.  Diabetes mellitus type 2: Mother.  Hyperlipidemia.: Mother.  Hypertension.: Mother and Father.  Primary malignant neoplasm of breast: Mother.

## 2022-05-01 NOTE — HISTORICAL [facility] CERNER
This is a historical note converted from [place]. Formatting and pictures may have been removed.  Please reference [facility] for original formatting and attached multimedia. History of Present Illness  53F L invasive ductal carcinoma?(-ER/SC/Her2) s/p neoadjuvant chemo (+clavicular LN, equivocal axillary LN)?who presents for LEFT BCT + SLNBx poss ALND. For details, please refer to previous H&P dated 12/6/18 (below). At present, she denies interval change to her clinical status. We will proceed with surgery as planned:  ?  Chaz Miller, PGY3 Surgery  Assessment/Plan  H&P 12/6/18  ?   History of Present Illness  53yF hx of left breast cancer, triple negative, dx september 2017, initially treated with neoadjuvant chemotherapy with follow up staging mid-treatment, found to have left supraclavicular lymph node, and changed to ciplatin based chemotherapy. Last chemo sept 2018.  ?   Patient has clinically negative axilla. Referred for ultrasound. Ultrasound (12/2018)?again confirmed the presence of a 2.2cm mass in the left breast at the 2:00 position. Two left axillary lymph nodes lacking discrete fatty sami were identified. These did not appear pathologically enlarged.  ?   PET scan shows (12/2018): Hypermetabolic lobulated mass in the upper outer quadrant of the left breast. Small left supraclavicular lymph node with equivocal radiotracer activity. No intrathoracic, intra-abdominal, intrapelvic or osseous metastasis is identified.  ?   Breast cancer risk profile:  Menarche- 12 yrs  Menopause- 40 yrs (hysterectomy)  3 children, first child at 15yrs  Birth control use- 3-4yrs  ?   PMH:  HTN  HLD  No heart attack, No stroke  ?   PSurg:  Hysterectomy  Tubal ligation  ?   SH:  Current smoker- 5-6 cigs per day  Denies alcohol abuse  + Marijuana  ?  ?  Review of Systems  + for constipation  Denies fevers, chills, weight loss  Denies chest pain or shortness of breath  Physical Exam  ??Vitals & Measurements  ??BP:?145/83?  SpO2:?100%?  ??HT:?154.94?cm? HT:?154.94?cm? WT:?70?kg? WT:?70?kg? BMI:?29.16?  ??NAD  RRR  Lungs CTA b/l  Left breast with palpable 2cm cm mass, firm, adherent to surrounding tissues  Left axilla with no palpable adenopathy  Left SC region- 1 palpable node  Right breast with no masses or lesion  Right axilla with no palpable nodes  ?  Assessment/Plan  1.?Cancer of left female breast?C50.912  ????  ??53yF stage IIIC triple negative invasive ductal breast cancer, no clinical disease identified in the axilla, + left supraclavicular node  - Left lumpectomy with sentinel lymph node biopsy, possible axillary dissection on 1/9/18  - Informed consent obtained in clinic  - Patient will need adjuvant chemotherapy  - Follow official read on bilateral mammogram  -?Referred to GI for colonoscopy given family history of colon cancer   Problem List/Past Medical History  Ongoing  Breast mass  Cancer of left female breast  Hyperlipidemia  Hypertension  Historical  No qualifying data  Procedure/Surgical History  Hysterectomy (01/01/2005)  BTL  port   Medications  ALPRAZOLAM 0.5 MG TABLET, 0.5 mg= 1 tab(s), Oral, Daily  AMLODIPINE BESYLATE 5 MG TAB, 5 mg= 1 tab(s), Oral, Daily  ATORVASTATIN 10 MG TABLET, 10 mg= 1 tab(s), Oral, Daily  buffered lidocaine 2% - 0.5 ml syringe, 10 mg= 0.5 mL, Subcutaneous, As Directed  ceFAZolin, 2 gm= 100 mL, IV Piggyback, On Call  CYCLOBENZAPRINE 10 MG TABLET, 10 mg= 1 tab(s), Oral, TID,? ?Unable to obtain  cyproheptadine 4 mg oral tablet, 4 mg= 1 tab(s), Oral, BID, 1 refills  heparin 5000 units/mL injectable solution, 5000 units= 1 mL, Subcutaneous, On Call  IVF Lactated Ringers LR Infusion 1,000 mL, 1000 mL, IV  LISINOPRIL 40 MG TABLET, 40 mg= 1 tab(s), Oral, Daily  OXYCODONE HCL 10 MG TABLET, 10 mg= 1 tab(s), Oral, TID  PROMETHAZINE 25 MG TABLET,? ?Not taking  Zofran, 4 mg= 2 mL, IV Push, Once  Allergies  No Known Allergies  Social History  Alcohol  Current, Beer, Wine, 1-2 times per month, Alcohol  use interferes with work or home: No. Drinks more than intended: No. Others hurt by drinking: No. Household alcohol concerns: No., 11/08/2018  Employment/School  medical disability, Work/School description: housekeeping. Previous employment/school: 11th grade. Activity level: Moderate physical work., 10/31/2018  Home/Environment  Lives with living with sister/ some days sleeps in her car or in a hotel. Living situation: Homeless/Shelter. Alcohol abuse in household: No. Substance abuse in household: No. Smoker in household: No. Family/Friends available for support: Yes. Concern for family members at home: No. Major illness in household: No. Financial concerns: Yes. TV/Computer concerns: No., 10/31/2018  Nutrition/Health  Type of diet: homeless/ occasional meals given by sister. Regular, Wants to lose weight: No., 12/04/2018  Substance Abuse  Never, 01/02/2019  Tobacco  Current every day smoker, Cigarettes, No, 5 per day. 2 year(s)., 12/06/2018  Family History  Cardiac arrhythmia.: Father.  Congestive heart disease.: Mother.  Diabetes mellitus type 2: Mother.  Hyperlipidemia.: Mother.  Hypertension.: Mother and Father.  Primary malignant neoplasm of breast: Mother.  Health Maintenance  Health Maintenance  ???Pending?(in the next year)  ??? ??OverDue  ??? ? ? ?Diabetes Screening due??and every?  ??? ??Due?  ??? ? ? ?ADL Screening due??01/09/19??and every 1??year(s)  ??? ? ? ?Alcohol Misuse Screening due??01/09/19??and every 1??year(s)  ??? ? ? ?Cervical Cancer Screening due??01/09/19??and every?  ??? ? ? ?Colorectal Screening due??01/09/19??and every?  ??? ? ? ?Hypertension Management-Education due??01/09/19??and every 1??year(s)  ??? ? ? ?Smoking Cessation due??01/09/19??Variable frequency  ??? ? ? ?Tetanus Vaccine due??01/09/19??and every 10??year(s)  ??? ??Due In Future?  ??? ? ? ?Depression Screening not due until??11/27/19??and every 1??year(s)  ??? ? ? ?Blood Pressure Screening not due until??12/06/19??and every  1??year(s)  ??? ? ? ?Body Mass Index Check not due until??12/06/19??and every 1??year(s)  ??? ? ? ?Hypertension Management-Blood Pressure not due until??12/06/19??and every 1??year(s)  ??? ? ? ?Hypertension Management-BMP not due until??01/02/20??and every 1??year(s)  ??? ? ? ?Obesity Screening not due until??01/02/20??and every 1??year(s)  ???Satisfied?(in the past 1 year)  ??? ??Satisfied?  ??? ? ? ?Blood Pressure Screening on??01/02/19.??Satisfied by Claire Cole  ??? ? ? ?Body Mass Index Check on??01/02/19.??Satisfied by Claire Cole  ??? ? ? ?Breast Cancer Screening on??12/04/18.??Satisfied by Josette Mohr  ??? ? ? ?Depression Screening on??11/27/18.??Satisfied by Merle Carrasco RN  ??? ? ? ?Diabetes Screening on??01/02/19.??Satisfied by Girma Noel Jr.  ??? ? ? ?Hypertension Management-BMP on??01/02/19.??Satisfied by Girma Noel Jr.  ??? ? ? ?Influenza Vaccine on??12/06/18.??Satisfied by Aliya Valente LPN  ??? ? ? ?Obesity Screening on??01/02/19.??Satisfied by Claire Cole  ?  ?      Will actually proceed with sentinel lymph node biopsy. Lumpectomy and SLNB.

## (undated) DEVICE — SUT CTD VICRYL 3-0 UND SUTU

## (undated) DEVICE — SEE MEDLINE ITEM 107746

## (undated) DEVICE — SUT PROLENE 3-0 SH DA 36 BL

## (undated) DEVICE — GLOVE SURG BIOGEL LATEX SZ 7.5

## (undated) DEVICE — SUT 3-0 VICRYL / SH (J416)

## (undated) DEVICE — GLOVE BIOGEL 7.5

## (undated) DEVICE — ELECTRODE REM PLYHSV RETURN 9

## (undated) DEVICE — NDL HYPO REG 25G X 1 1/2

## (undated) DEVICE — SEE MEDLINE ITEM 157117

## (undated) DEVICE — NDL 18GA X1 1/2 REG BEVEL

## (undated) DEVICE — SEE MEDLINE ITEM 146292

## (undated) DEVICE — APPLICATOR CHLORAPREP ORN 26ML

## (undated) DEVICE — BLADE SURG CARBON STEEL SZ11

## (undated) DEVICE — SOL SOD CHLORIDE 0.9% 10ML

## (undated) DEVICE — GAUZE SPONGE 4X4 12PLY

## (undated) DEVICE — SEE MEDLINE ITEM 146417

## (undated) DEVICE — SOL NACL 0.9% INJ 250ML BG

## (undated) DEVICE — SEE L#120831

## (undated) DEVICE — Device

## (undated) DEVICE — CLOSURE SKIN STERI STRIP 1/2X4

## (undated) DEVICE — SUT 4/0 18IN COATED VICRYL

## (undated) DEVICE — SYR 3CC LUER LOC

## (undated) DEVICE — SEE MEDLINE ITEM 152622

## (undated) DEVICE — SET DECANTER MEDICHOICE

## (undated) DEVICE — COVER OVERHEAD SURG LT BLUE

## (undated) DEVICE — SHEET THYROID W/ISO-BAC

## (undated) DEVICE — DRESSING TRANS 4X4 TEGADERM

## (undated) DEVICE — SYR 10CC LUER LOCK

## (undated) DEVICE — DRAPE C-ARM FOR MOBILE XRAY